# Patient Record
Sex: FEMALE | Race: WHITE | NOT HISPANIC OR LATINO | Employment: UNEMPLOYED | URBAN - METROPOLITAN AREA
[De-identification: names, ages, dates, MRNs, and addresses within clinical notes are randomized per-mention and may not be internally consistent; named-entity substitution may affect disease eponyms.]

---

## 2017-01-09 PROBLEM — E66.01 OBESITY, CLASS III, BMI 40-49.9 (MORBID OBESITY): Status: ACTIVE | Noted: 2017-01-09

## 2017-01-09 PROBLEM — E66.813 OBESITY, CLASS III, BMI 40-49.9 (MORBID OBESITY): Status: ACTIVE | Noted: 2017-01-09

## 2017-01-10 DIAGNOSIS — I48.19 PERSISTENT ATRIAL FIBRILLATION (HCC): Primary | ICD-10-CM

## 2017-01-10 RX ORDER — SODIUM CHLORIDE 0.9 % (FLUSH) 0.9 %
1-10 SYRINGE (ML) INJECTION AS NEEDED
Status: CANCELLED | OUTPATIENT
Start: 2017-01-10

## 2017-01-10 RX ORDER — ACETAMINOPHEN 325 MG/1
650 TABLET ORAL EVERY 4 HOURS PRN
Status: CANCELLED | OUTPATIENT
Start: 2017-01-10

## 2017-01-10 RX ORDER — ONDANSETRON 2 MG/ML
4 INJECTION INTRAMUSCULAR; INTRAVENOUS EVERY 6 HOURS PRN
Status: CANCELLED | OUTPATIENT
Start: 2017-01-10

## 2017-01-13 DIAGNOSIS — I10 ESSENTIAL HYPERTENSION: ICD-10-CM

## 2017-01-13 RX ORDER — DILTIAZEM HYDROCHLORIDE 360 MG/1
360 CAPSULE, EXTENDED RELEASE ORAL DAILY
Qty: 30 CAPSULE | Refills: 5 | OUTPATIENT
Start: 2017-01-13 | End: 2017-01-17 | Stop reason: SDUPTHER

## 2017-01-17 ENCOUNTER — HOSPITAL ENCOUNTER (OUTPATIENT)
Dept: CT IMAGING | Facility: HOSPITAL | Age: 57
Discharge: HOME | End: 2017-01-17
Attending: INTERNAL MEDICINE | Admitting: INTERNAL MEDICINE

## 2017-01-17 ENCOUNTER — APPOINTMENT (OUTPATIENT)
Dept: PREADMISSION TESTING | Facility: HOSPITAL | Age: 57
End: 2017-01-17

## 2017-01-17 DIAGNOSIS — I48.19 PERSISTENT ATRIAL FIBRILLATION (HCC): ICD-10-CM

## 2017-01-17 DIAGNOSIS — I10 ESSENTIAL HYPERTENSION: ICD-10-CM

## 2017-01-17 LAB
ALBUMIN SERPL-MCNC: 4.2 G/DL (ref 3.2–4.8)
ALBUMIN/GLOB SERPL: 1.2 G/DL (ref 1.5–2.5)
ALP SERPL-CCNC: 73 U/L (ref 25–100)
ALT SERPL W P-5'-P-CCNC: 22 U/L (ref 7–40)
ANION GAP SERPL CALCULATED.3IONS-SCNC: 15 MMOL/L (ref 3–11)
AST SERPL-CCNC: 21 U/L (ref 0–33)
BILIRUB SERPL-MCNC: 0.3 MG/DL (ref 0.3–1.2)
BUN BLD-MCNC: 11 MG/DL (ref 9–23)
BUN/CREAT SERPL: 15.7 (ref 7–25)
CALCIUM SPEC-SCNC: 9.9 MG/DL (ref 8.7–10.4)
CHLORIDE SERPL-SCNC: 102 MMOL/L (ref 99–109)
CO2 SERPL-SCNC: 28 MMOL/L (ref 20–31)
CREAT BLD-MCNC: 0.7 MG/DL (ref 0.6–1.3)
DEPRECATED RDW RBC AUTO: 42.3 FL (ref 37–54)
ERYTHROCYTE [DISTWIDTH] IN BLOOD BY AUTOMATED COUNT: 13 % (ref 11.3–14.5)
GFR SERPL CREATININE-BSD FRML MDRD: 86 ML/MIN/1.73
GLOBULIN UR ELPH-MCNC: 3.6 GM/DL
GLUCOSE BLD-MCNC: 93 MG/DL (ref 70–100)
HCT VFR BLD AUTO: 37.6 % (ref 34.5–44)
HGB BLD-MCNC: 12.5 G/DL (ref 11.5–15.5)
INR PPP: 1.12
MAGNESIUM SERPL-MCNC: 2.1 MG/DL (ref 1.3–2.7)
MCH RBC QN AUTO: 29.7 PG (ref 27–31)
MCHC RBC AUTO-ENTMCNC: 33.2 G/DL (ref 32–36)
MCV RBC AUTO: 89.3 FL (ref 80–99)
PLATELET # BLD AUTO: 332 10*3/MM3 (ref 150–450)
PMV BLD AUTO: 9.9 FL (ref 6–12)
POTASSIUM BLD-SCNC: 4.2 MMOL/L (ref 3.5–5.5)
PROT SERPL-MCNC: 7.8 G/DL (ref 5.7–8.2)
PROTHROMBIN TIME: 12.2 SECONDS (ref 9.6–11.5)
RBC # BLD AUTO: 4.21 10*6/MM3 (ref 3.89–5.14)
SODIUM BLD-SCNC: 145 MMOL/L (ref 132–146)
WBC NRBC COR # BLD: 11.2 10*3/MM3 (ref 3.5–10.8)

## 2017-01-17 PROCEDURE — 85610 PROTHROMBIN TIME: CPT | Performed by: PHYSICIAN ASSISTANT

## 2017-01-17 PROCEDURE — 83735 ASSAY OF MAGNESIUM: CPT | Performed by: PHYSICIAN ASSISTANT

## 2017-01-17 PROCEDURE — 85027 COMPLETE CBC AUTOMATED: CPT | Performed by: PHYSICIAN ASSISTANT

## 2017-01-17 PROCEDURE — 0 IOPAMIDOL PER 1 ML: Performed by: INTERNAL MEDICINE

## 2017-01-17 PROCEDURE — 80053 COMPREHEN METABOLIC PANEL: CPT | Performed by: PHYSICIAN ASSISTANT

## 2017-01-17 PROCEDURE — 71275 CT ANGIOGRAPHY CHEST: CPT

## 2017-01-17 PROCEDURE — 36415 COLL VENOUS BLD VENIPUNCTURE: CPT

## 2017-01-17 RX ORDER — DILTIAZEM HYDROCHLORIDE 360 MG/1
360 CAPSULE, EXTENDED RELEASE ORAL DAILY
Qty: 30 CAPSULE | Refills: 5 | OUTPATIENT
Start: 2017-01-17 | End: 2017-06-09 | Stop reason: HOSPADM

## 2017-01-17 RX ORDER — DOCUSATE SODIUM 100 MG/1
200 CAPSULE, LIQUID FILLED ORAL DAILY
COMMUNITY
End: 2021-05-21

## 2017-01-17 RX ADMIN — IOPAMIDOL 85 ML: 755 INJECTION, SOLUTION INTRAVENOUS at 16:35

## 2017-01-17 NOTE — DISCHARGE INSTRUCTIONS
The following instructions given during Pre Admission Testing visit:    Do not eat or drink anything after MN except for sips of water with your a.m. Prescription meds unless otherwise instructed by your physician.    Glasses and jewelry may be worn, but dentures must be removed prior to cath/procedure.    Leave any items you consider valuable at home.    Family members may wait in [facility] waiting area during procedure.    Bring all medications in their original containers the day of procedure.    Bring photo ID and insurance cards on the day of procedure.    Need to make arrangements for transportation prior to discharge.    The following handouts were given:     Heart Cath pathway (if applicable)   Cardiac Cath booklet published by [facility]    OR appropriate [facility] procedure booklet    If applicable, pt instructed to bring CPAP mask and tubing the day of procedure.

## 2017-01-18 ENCOUNTER — ANESTHESIA (OUTPATIENT)
Dept: CARDIOLOGY | Facility: HOSPITAL | Age: 57
End: 2017-01-18

## 2017-01-18 ENCOUNTER — ANESTHESIA EVENT (OUTPATIENT)
Dept: CARDIOLOGY | Facility: HOSPITAL | Age: 57
End: 2017-01-18

## 2017-01-18 ENCOUNTER — HOSPITAL ENCOUNTER (OUTPATIENT)
Dept: CARDIOLOGY | Facility: HOSPITAL | Age: 57
Setting detail: HOSPITAL OUTPATIENT SURGERY
Discharge: HOME | End: 2017-01-18

## 2017-01-18 DIAGNOSIS — I48.19 PERSISTENT ATRIAL FIBRILLATION (HCC): ICD-10-CM

## 2017-01-18 LAB
BH CV ECHO MEAS - BSA(HAYCOCK): 2.5 M^2
BH CV ECHO MEAS - BSA: 2.3 M^2
BH CV ECHO MEAS - BZI_BMI: 46.5 KILOGRAMS/M^2
BH CV ECHO MEAS - BZI_METRIC_HEIGHT: 167.6 CM
BH CV ECHO MEAS - BZI_METRIC_WEIGHT: 130.6 KG
LV EF 2D ECHO EST: 55 %

## 2017-01-18 PROCEDURE — 25010000002 PHENYLEPHRINE PER 1 ML: Performed by: NURSE ANESTHETIST, CERTIFIED REGISTERED

## 2017-01-18 PROCEDURE — 25010000002 ONDANSETRON PER 1 MG: Performed by: PHYSICIAN ASSISTANT

## 2017-01-18 PROCEDURE — 25010000002 DEXAMETHASONE PER 1 MG: Performed by: NURSE ANESTHETIST, CERTIFIED REGISTERED

## 2017-01-18 PROCEDURE — 25010000002 PROPOFOL 10 MG/ML EMULSION: Performed by: NURSE ANESTHETIST, CERTIFIED REGISTERED

## 2017-01-18 PROCEDURE — 93312 ECHO TRANSESOPHAGEAL: CPT | Performed by: INTERNAL MEDICINE

## 2017-01-18 PROCEDURE — 25010000002 PROPOFOL 1000 MG/ML EMULSION: Performed by: NURSE ANESTHETIST, CERTIFIED REGISTERED

## 2017-01-18 PROCEDURE — 93321 DOPPLER ECHO F-UP/LMTD STD: CPT | Performed by: INTERNAL MEDICINE

## 2017-01-18 PROCEDURE — 93312 ECHO TRANSESOPHAGEAL: CPT

## 2017-01-18 PROCEDURE — 93321 DOPPLER ECHO F-UP/LMTD STD: CPT

## 2017-01-18 PROCEDURE — 93325 DOPPLER ECHO COLOR FLOW MAPG: CPT

## 2017-01-18 RX ORDER — SODIUM CHLORIDE 9 MG/ML
INJECTION, SOLUTION INTRAVENOUS CONTINUOUS PRN
Status: DISCONTINUED | OUTPATIENT
Start: 2017-01-18 | End: 2017-01-18 | Stop reason: SURG

## 2017-01-18 RX ORDER — ROCURONIUM BROMIDE 10 MG/ML
INJECTION, SOLUTION INTRAVENOUS AS NEEDED
Status: DISCONTINUED | OUTPATIENT
Start: 2017-01-18 | End: 2017-01-18 | Stop reason: SURG

## 2017-01-18 RX ORDER — LIDOCAINE HYDROCHLORIDE 10 MG/ML
INJECTION, SOLUTION INFILTRATION; PERINEURAL AS NEEDED
Status: DISCONTINUED | OUTPATIENT
Start: 2017-01-18 | End: 2017-01-18 | Stop reason: SURG

## 2017-01-18 RX ORDER — DEXAMETHASONE SODIUM PHOSPHATE 4 MG/ML
INJECTION, SOLUTION INTRA-ARTICULAR; INTRALESIONAL; INTRAMUSCULAR; INTRAVENOUS; SOFT TISSUE AS NEEDED
Status: DISCONTINUED | OUTPATIENT
Start: 2017-01-18 | End: 2017-01-18 | Stop reason: SURG

## 2017-01-18 RX ORDER — PROPOFOL 10 MG/ML
VIAL (ML) INTRAVENOUS AS NEEDED
Status: DISCONTINUED | OUTPATIENT
Start: 2017-01-18 | End: 2017-01-18 | Stop reason: SURG

## 2017-01-18 RX ADMIN — LIDOCAINE HYDROCHLORIDE 50 MG: 10 INJECTION, SOLUTION INFILTRATION; PERINEURAL at 12:00

## 2017-01-18 RX ADMIN — ROCURONIUM BROMIDE 50 MG: 10 INJECTION, SOLUTION INTRAVENOUS at 12:00

## 2017-01-18 RX ADMIN — PROPOFOL 200 MG: 10 INJECTION, EMULSION INTRAVENOUS at 12:00

## 2017-01-18 RX ADMIN — PROPOFOL 25 MCG/KG/MIN: 10 INJECTION, EMULSION INTRAVENOUS at 12:06

## 2017-01-18 RX ADMIN — DEXAMETHASONE SODIUM PHOSPHATE 8 MG: 4 INJECTION, SOLUTION INTRAMUSCULAR; INTRAVENOUS at 12:05

## 2017-01-18 RX ADMIN — ONDANSETRON 4 MG: 2 INJECTION INTRAMUSCULAR; INTRAVENOUS at 16:46

## 2017-01-18 RX ADMIN — SODIUM CHLORIDE: 9 INJECTION, SOLUTION INTRAVENOUS at 11:48

## 2017-01-18 RX ADMIN — PHENYLEPHRINE HYDROCHLORIDE 50 MCG: 10 INJECTION INTRAVENOUS at 14:25

## 2017-01-18 NOTE — ANESTHESIA PROCEDURE NOTES
Airway  Urgency: elective    Airway not difficult    General Information and Staff    Patient location during procedure: OR  Anesthesiologist: FREDY GROSS  CRNA: KAY ALSTON    Indications and Patient Condition  Indications for airway management: airway protection    Preoxygenated: yes  MILS not maintained throughout  Mask difficulty assessment: 2 - vent by mask + OA or adjuvant +/- NMBA    Final Airway Details  Final airway type: endotracheal airway      Successful airway: ETT  Cuffed: yes   Successful intubation technique: direct laryngoscopy  Facilitating devices/methods: intubating stylet  Endotracheal tube insertion site: oral  Blade: Brenton  Blade size: #3  ETT size: 7.5 mm  Cormack-Lehane Classification: grade I - full view of glottis  Placement verified by: chest auscultation and capnometry   Cuff volume (mL): 6  Measured from: lips  ETT to lips (cm): 21  Number of attempts at approach: 1    Additional Comments  Negative epigastric sounds, Breath sound equal bilaterally with symmetric chest rise and fall

## 2017-01-18 NOTE — ANESTHESIA PREPROCEDURE EVALUATION
Anesthesia Evaluation     Patient summary reviewed and Nursing notes reviewed    History of anesthetic complications (PONV)   Airway   Mallampati: II  TM distance: >3 FB  Neck ROM: full  no difficulty expected  Dental    (+) upper dentures    Pulmonary - negative pulmonary ROS and normal exam    breath sounds clear to auscultation  Cardiovascular - normal exam  (+) hypertension well controlled, dysrhythmias Atrial Fib,     ECG reviewed  Rhythm: regular  Rate: normal  ROS comment: Stress echo 2015, WNL EF: 60%    Neuro/Psych- negative ROS  GI/Hepatic/Renal/Endo    (+) morbid obesity, GERD well controlled, diabetes mellitus type 2,     Musculoskeletal (-) negative ROS    Abdominal    Substance History - negative use     OB/GYN          Other - negative ROS                            Anesthesia Plan    ASA 3     general     intravenous induction   Anesthetic plan and risks discussed with patient.    Plan discussed with CRNA.

## 2017-01-18 NOTE — ANESTHESIA POSTPROCEDURE EVALUATION
Patient: Renetta Horner    Procedure Summary     Date Anesthesia Start Anesthesia Stop Room / Location    01/18/17 1148  [facility] / [facility]       Procedure Diagnosis Provider Provider    Schedule for a PVA. Cancel the [facility] admission. (N/A ) Persistent atrial fibrillation  (afib) DO Hammad Mosley MD          Anesthesia Type: general  Last vitals  /61 (01/18/17 1719)    Temp 98.4 °F (36.9 °C) (01/18/17 1719)    Pulse 68 (01/18/17 1719)   Resp 16 (01/18/17 1719)    SpO2 91 % (01/18/17 1719)      Post Anesthesia Care and Evaluation    Patient location during evaluation: PACU  Patient participation: complete - patient participated  Level of consciousness: awake and alert  Pain score: 0  Pain management: adequate  Airway patency: patent  Anesthetic complications: No anesthetic complications  PONV Status: none  Cardiovascular status: hemodynamically stable and acceptable  Respiratory status: nonlabored ventilation, acceptable and nasal cannula  Hydration status: acceptable

## 2017-01-19 PROBLEM — I48.19 PERSISTENT ATRIAL FIBRILLATION: Status: RESOLVED | Noted: 2017-01-18 | Resolved: 2017-01-19

## 2017-01-20 ENCOUNTER — TELEPHONE (OUTPATIENT)
Dept: CARDIOLOGY | Facility: CLINIC | Age: 57
End: 2017-01-20

## 2017-01-20 RX ORDER — LOSARTAN POTASSIUM 25 MG/1
25 TABLET ORAL DAILY
Qty: 30 TABLET | Refills: 6 | OUTPATIENT
Start: 2017-01-20 | End: 2017-08-14 | Stop reason: SDUPTHER

## 2017-01-20 NOTE — TELEPHONE ENCOUNTER
"When Mrs. Horner was discharged yesterday, Dr. Rey had ordered for her to start losartan 25mg Q Day.  She called & said the order did not transfer to her pharmacy.  I called it in for her.    She also stated she started having a \"loose cough\" last night.  She has PRN lasix that she takes.  I suggested she take a lasix this AM & call me back this afternoon to see if the cough has improved.  "

## 2017-01-30 ENCOUNTER — TELEPHONE (OUTPATIENT)
Dept: CARDIOLOGY | Facility: CLINIC | Age: 57
End: 2017-01-30

## 2017-01-30 NOTE — TELEPHONE ENCOUNTER
"Mrs. Pendleton called at 9:30 this AM stating she'd had \"racing\" Friday night that lasted until Sat AM, then started again yesterday afternoon & was still going when she called me.  HR was \"bouncing\" from 119-145.  She had taken 75mg of flecainide an hour before - I had her take an extra 75mg.  She called me now & says she's doing better, HR in the 90s.  Reassured her this is normal 2wks post PVA & that if she needs to, she can take an extra 75mg once a day, but to let me know if she's needing to do that often, as she will need a refill / dose adjustment.  "

## 2017-02-27 ENCOUNTER — TELEPHONE (OUTPATIENT)
Dept: CARDIOLOGY | Facility: CLINIC | Age: 57
End: 2017-02-27

## 2017-02-27 DIAGNOSIS — I48.0 PAROXYSMAL ATRIAL FIBRILLATION (HCC): Primary | ICD-10-CM

## 2017-02-27 RX ORDER — DABIGATRAN ETEXILATE 150 MG/1
150 CAPSULE ORAL 2 TIMES DAILY
Qty: 60 CAPSULE | Refills: 7 | Status: SHIPPED | OUTPATIENT
Start: 2017-02-27 | End: 2017-10-25 | Stop reason: SDUPTHER

## 2017-02-27 NOTE — TELEPHONE ENCOUNTER
----- Message from Stephon Holland sent at 2/27/2017  8:21 AM EST -----  Contact: SEPIDEH  ROSALINA IS REQUESTING A REFILL ON HER PRADAXA BE CALLED INTO [facility] [place]

## 2017-03-02 ENCOUNTER — OFFICE VISIT (OUTPATIENT)
Dept: CARDIOLOGY | Facility: CLINIC | Age: 57
End: 2017-03-02

## 2017-03-02 VITALS
RESPIRATION RATE: 16 BRPM | OXYGEN SATURATION: 97 % | DIASTOLIC BLOOD PRESSURE: 87 MMHG | HEIGHT: 66 IN | BODY MASS INDEX: 45.35 KG/M2 | SYSTOLIC BLOOD PRESSURE: 159 MMHG | WEIGHT: 282.2 LBS | HEART RATE: 84 BPM

## 2017-03-02 DIAGNOSIS — R06.83 SNORING: ICD-10-CM

## 2017-03-02 DIAGNOSIS — Z00.00 HEALTHCARE MAINTENANCE: ICD-10-CM

## 2017-03-02 DIAGNOSIS — I10 ESSENTIAL HYPERTENSION: Primary | ICD-10-CM

## 2017-03-02 DIAGNOSIS — E11.9 TYPE 2 DIABETES MELLITUS WITHOUT COMPLICATION, WITHOUT LONG-TERM CURRENT USE OF INSULIN (HCC): ICD-10-CM

## 2017-03-02 DIAGNOSIS — I48.0 PAROXYSMAL ATRIAL FIBRILLATION (HCC): ICD-10-CM

## 2017-03-02 PROCEDURE — 99214 OFFICE O/P EST MOD 30 MIN: CPT | Performed by: NURSE PRACTITIONER

## 2017-03-02 PROCEDURE — 93000 ELECTROCARDIOGRAM COMPLETE: CPT | Performed by: NURSE PRACTITIONER

## 2017-03-02 RX ORDER — CIMETIDINE 400 MG/1
400 TABLET, FILM COATED ORAL 2 TIMES DAILY
COMMUNITY
End: 2017-06-09 | Stop reason: HOSPADM

## 2017-03-02 NOTE — PATIENT INSTRUCTIONS
Atrial Fibrillation  Atrial fibrillation is a type of irregular or rapid heartbeat (arrhythmia). In atrial fibrillation, the heart quivers continuously in a chaotic pattern. This occurs when parts of the heart receive disorganized signals that make the heart unable to pump blood normally. This can increase the risk for stroke, heart failure, and other heart-related conditions. There are different types of atrial fibrillation, including:  · Paroxysmal atrial fibrillation. This type starts suddenly, and it usually stops on its own shortly after it starts.  · Persistent atrial fibrillation. This type often lasts longer than a week. It may stop on its own or with treatment.  · Long-lasting persistent atrial fibrillation. This type lasts longer than 12 months.  · Permanent atrial fibrillation. This type does not go away.  Talk with your health care provider to learn about the type of atrial fibrillation that you have.  CAUSES  This condition is caused by some heart-related conditions or procedures, including:  · A heart attack.  · Coronary artery disease.  · Heart failure.  · Heart valve conditions.  · High blood pressure.  · Inflammation of the sac that surrounds the heart (pericarditis).  · Heart surgery.  · Certain heart rhythm disorders, such as Flynn-Parkinson-White syndrome.  Other causes include:  · Pneumonia.  · Obstructive sleep apnea.  · Blockage of an artery in the lungs (pulmonary embolism, or PE).  · Lung cancer.  · Chronic lung disease.  · Thyroid problems, especially if the thyroid is overactive (hyperthyroidism).  · Caffeine.  · Excessive alcohol use or illegal drug use.  · Use of some medicines, including certain decongestants and diet pills.  Sometimes, the cause cannot be found.  RISK FACTORS  This condition is more likely to develop in:  · People who are older in age.  · People who smoke.  · People who have diabetes mellitus.  · People who are overweight (obese).  · Athletes who exercise  vigorously.  SYMPTOMS  Symptoms of this condition include:  · A feeling that your heart is beating rapidly or irregularly.  · A feeling of discomfort or pain in your chest.  · Shortness of breath.  · Sudden light-headedness or weakness.  · Getting tired easily during exercise.  In some cases, there are no symptoms.  DIAGNOSIS  Your health care provider may be able to detect atrial fibrillation when taking your pulse. If detected, this condition may be diagnosed with:  · An electrocardiogram (ECG).  · A Holter monitor test that records your heartbeat patterns over a 24-hour period.  · Transthoracic echocardiogram (TTE) to evaluate how blood flows through your heart.  · Transesophageal echocardiogram (YARON) to view more detailed images of your heart.  · A stress test.  · Imaging tests, such as a CT scan or chest X-ray.  · Blood tests.  TREATMENT  The main goals of treatment are to prevent blood clots from forming and to keep your heart beating at a normal rate and rhythm. The type of treatment that you receive depends on many factors, such as your underlying medical conditions and how you feel when you are experiencing atrial fibrillation.  This condition may be treated with:  · Medicine to slow down the heart rate, bring the heart's rhythm back to normal, or prevent clots from forming.  · Electrical cardioversion. This is a procedure that resets your heart's rhythm by delivering a controlled, low-energy shock to the heart through your skin.  · Different types of ablation, such as catheter ablation, catheter ablation with pacemaker, or surgical ablation. These procedures destroy the heart tissues that send abnormal signals. When the pacemaker is used, it is placed under your skin to help your heart beat in a regular rhythm.  HOME CARE INSTRUCTIONS  · Take over-the counter and prescription medicines only as told by your health care provider.  · If your health care provider prescribed a blood-thinning medicine  (anticoagulant), take it exactly as told. Taking too much blood-thinning medicine can cause bleeding. If you do not take enough blood-thinning medicine, you will not have the protection that you need against stroke and other problems.  · Do not use tobacco products, including cigarettes, chewing tobacco, and e-cigarettes. If you need help quitting, ask your health care provider.  · If you have obstructive sleep apnea, manage your condition as told by your health care provider.  · Do not drink alcohol.  · Do not drink beverages that contain caffeine, such as coffee, soda, and tea.  · Maintain a healthy weight. Do not use diet pills unless your health care provider approves. Diet pills may make heart problems worse.  · Follow diet instructions as told by your health care provider.  · Exercise regularly as told by your health care provider.  · Keep all follow-up visits as told by your health care provider. This is important.  PREVENTION  · Avoid drinking beverages that contain caffeine or alcohol.  · Avoid certain medicines, especially medicines that are used for breathing problems.  · Avoid certain herbs and herbal medicines, such as those that contain ephedra or ginseng.  · Do not use illegal drugs, such as cocaine and amphetamines.  · Do not smoke.  · Manage your high blood pressure.  SEEK MEDICAL CARE IF:  · You notice a change in the rate, rhythm, or strength of your heartbeat.  · You are taking an anticoagulant and you notice increased bruising.  · You tire more easily when you exercise or exert yourself.  SEEK IMMEDIATE MEDICAL CARE IF:  · You have chest pain, abdominal pain, sweating, or weakness.  · You feel nauseous.  · You notice blood in your vomit, bowel movement, or urine.  · You have shortness of breath.  · You suddenly have swollen feet and ankles.  · You feel dizzy.  · You have sudden weakness or numbness of the face, arm, or leg, especially on one side of the body.  · You have trouble speaking,  trouble understanding, or both (aphasia).  · Your face or your eyelid droops on one side.  These symptoms may represent a serious problem that is an emergency. Do not wait to see if the symptoms will go away. Get medical help right away. Call your local emergency services (911 in the [place] Do not drive yourself to the hospital.     This information is not intended to replace advice given to you by your health care provider. Make sure you discuss any questions you have with your health care provider.     Document Released: 12/18/2006 Document Revised: 09/07/2016 Document Reviewed: 04/13/2016  [employer] Patient Education ©2016 [employer]

## 2017-03-02 NOTE — PROGRESS NOTES
Subjective   Renetta Horner is a 56 y.o. female     Chief Complaint   Patient presents with   • Follow-up   • Atrial Fibrillation       HPI    Problem List:    1. Atrial Fibrillation   1.1 CHADS score 2; patient intolerant to ASA/Plavis and anticoagulation d/c'd due to hemarthrosis of the right knee (She has tried Xarelto and Eliquis)  1.2 Event Monitor 4/23-5/3/16 - Afib and Afib with RVR  1.3 Ablation with Dr. Rey 1/18/17  2. Hypertension  2.1 Echo 4/8/15 - EF > 65%; DD II; trace MR  3. Chest Pain   3.1 Stress Test 4/8/15 - no ischemia   4. Diabetes Mellitus II    Patient is a 56-year-old female who presents today for a follow-up s/p ablation with her  at her side.  She denies any chest pain, pressure, presyncope, syncope, orthopnea or PND.  She will feel palpitations some, but much better.  She will get dizzy if she bends over or tilts her head back.  She says she does not get short of breath with any of her activity.  She has not had any recent labs.  She denies any signs of bleeding or cerebral ischemic events.     Current Outpatient Prescriptions   Medication Sig Dispense Refill   • cimetidine (TAGAMET) 400 MG tablet Take 400 mg by mouth 2 (Two) Times a Day.     • dabigatran etexilate (PRADAXA) 150 MG capsu Take 1 capsule by mouth 2 (Two) Times a Day. 60 capsule 7   • diltiaZEM CD (CARDIZEM CD) 360 MG 24 hr capsule Take 1 capsule by mouth Daily. 30 capsule 5   • docusate sodium (COLACE) 100 MG capsule Take 200 mg by mouth Daily.     • flecainide (TAMBOCOR) 150 MG tablet Take 0.5 tablets by mouth Every 12 (Twelve) Hours for 180 days. 30 tablet 5   • furosemide (LASIX) 20 MG tablet Take 1 tablet by mouth daily as needed (edema). 20 tablet 3   • losartan (COZAAR) 25 MG tablet Take 1 tablet by mouth Daily. 30 tablet 6   • metFORMIN (GLUCOPHAGE) 500 MG tablet Take 500 mg by mouth 2 (Two) Times a Day With Meals.       No current facility-administered medications for this visit.         ALLERGIES    Indomethacin; Lisinopril; Penicillins; Other; Sulfa antibiotics; Ultram [tramadol]; and Codeine    Past Medical History   Diagnosis Date   • Arthritis    • Atrial fibrillation      CHADS-VASC = 3   • Chest pain    • Diabetes mellitus      on oral agents, Type 2 , Patient states last a1c was 5.9 --DX'D 5 YEARS AGO, CHECKS BLOOD SUGAR DAILY AVERAGE 115-122   • Fatigue    • GERD (gastroesophageal reflux disease)    • Headache    • Heart murmur    • Hypertension      CONTROLLED WITH MEDS PER PT    • Palpitations    • PONV (postoperative nausea and vomiting)    • Wears dentures      UPPER PLATE        Social History     Social History   • Marital status:      Spouse name: N/A   • Number of children: N/A   • Years of education: N/A     Occupational History   • Not on file.     Social History Main Topics   • Smoking status: Former Smoker     Types: Cigarettes   • Smokeless tobacco: Never Used      Comment: QUIT 1997   • Alcohol use No   • Drug use: No   • Sexual activity: Defer     Other Topics Concern   • Not on file     Social History Narrative       Family History   Problem Relation Age of Onset   • Coronary artery disease Mother    • Breast cancer Mother    • Diabetes Mother    • Other Mother      Benign prostatic hypertrophy       Review of Systems   Constitutional: Negative for diaphoresis and fatigue.   HENT: Negative for rhinorrhea and sneezing.    Eyes: Positive for visual disturbance (reading glasses ).   Respiratory: Negative for chest tightness and shortness of breath.    Cardiovascular: Positive for palpitations (some but not too bad) and leg swelling (no more than usual ). Negative for chest pain.   Gastrointestinal: Negative for nausea and vomiting.   Endocrine: Negative.    Genitourinary: Negative for difficulty urinating.   Musculoskeletal: Positive for arthralgias, back pain and neck pain.   Allergic/Immunologic: Negative.    Neurological: Positive for dizziness (bend over or  "tilt head back ). Negative for syncope and light-headedness.   Hematological: Bruises/bleeds easily (not too bad ).   Psychiatric/Behavioral: Negative.        Objective   Visit Vitals   • /87 (BP Location: Left arm, Patient Position: Sitting)   • Pulse 84   • Resp 16   • Ht 66\" (167.6 cm)   • Wt 282 lb 3.2 oz (128 kg)   • LMP  (LMP Unknown)   • SpO2 97%   • BMI 45.55 kg/m2     Lab Results (most recent)     None        Physical Exam   Constitutional: She is oriented to person, place, and time. Vital signs are normal. She appears well-developed. She is active and cooperative.   HENT:   Head: Normocephalic.   Eyes: Lids are normal.   Neck: Normal carotid pulses, no hepatojugular reflux and no JVD present. Carotid bruit is not present.   Cardiovascular: Normal rate and normal heart sounds.  An irregularly irregular rhythm present.   Pulses:       Radial pulses are 2+ on the right side, and 2+ on the left side.        Dorsalis pedis pulses are 2+ on the right side, and 2+ on the left side.        Posterior tibial pulses are 2+ on the right side, and 2+ on the left side.   Trace edema BLE.    Pulmonary/Chest: Effort normal and breath sounds normal.   Abdominal: Normal appearance.   Neurological: She is alert and oriented to person, place, and time.   Skin: Skin is warm, dry and intact.   Psychiatric: She has a normal mood and affect. Her speech is normal and behavior is normal. Judgment and thought content normal. Cognition and memory are normal.       Procedure     ECG 12 Lead  Date/Time: 3/2/2017 1:47 PM  Performed by: BANDAR MONTERO  Authorized by: BANDAR MONTERO   Comparison: compared with previous ECG from 1/20/2017  Rhythm: atrial fibrillation  Rate: normal  QRS axis: normal  Clinical impression: abnormal ECG                 Assessment/Plan      Diagnosis Plan   1. Essential hypertension  Lipid Panel    Comprehensive Metabolic Panel   2. Type 2 diabetes mellitus without complication, without long-term " current use of insulin  Lipid Panel   3. Paroxysmal atrial fibrillation  Thyroid Panel With TSH   4. Snoring  Pulse Oximetry Device   5. Healthcare maintenance  Lipid Panel    Comprehensive Metabolic Panel    Thyroid Panel With TSH       Return in about 6 months (around 9/2/2017).       Patient is doing well from a cardiac standpoint.  She will continue her medication regimen.  She will get TSH, lipids and CMP.  She will follow-up in 6 mos or sooner if any changes.  She will wear an overnight pulse ox.

## 2017-03-14 ENCOUNTER — TELEPHONE (OUTPATIENT)
Dept: CARDIOLOGY | Facility: CLINIC | Age: 57
End: 2017-03-14

## 2017-03-14 DIAGNOSIS — E11.9 TYPE 2 DIABETES MELLITUS WITHOUT COMPLICATION, WITHOUT LONG-TERM CURRENT USE OF INSULIN (HCC): Primary | ICD-10-CM

## 2017-03-14 DIAGNOSIS — I10 ESSENTIAL HYPERTENSION: ICD-10-CM

## 2017-03-14 NOTE — TELEPHONE ENCOUNTER
Patient aware of results of recent labs. Patient reports she is a diabetic. She declines use of any Statin at this time. Will fax lab order to patient to recheck Potassium level.

## 2017-04-21 ENCOUNTER — TELEPHONE (OUTPATIENT)
Dept: CARDIOLOGY | Facility: CLINIC | Age: 57
End: 2017-04-21

## 2017-04-28 ENCOUNTER — OFFICE VISIT (OUTPATIENT)
Dept: CARDIOLOGY | Facility: CLINIC | Age: 57
End: 2017-04-28

## 2017-04-28 VITALS
HEART RATE: 108 BPM | DIASTOLIC BLOOD PRESSURE: 78 MMHG | BODY MASS INDEX: 46.61 KG/M2 | SYSTOLIC BLOOD PRESSURE: 138 MMHG | HEIGHT: 66 IN | WEIGHT: 290 LBS

## 2017-04-28 DIAGNOSIS — I48.19 PERSISTENT ATRIAL FIBRILLATION (HCC): Primary | ICD-10-CM

## 2017-04-28 PROCEDURE — 99213 OFFICE O/P EST LOW 20 MIN: CPT | Performed by: INTERNAL MEDICINE

## 2017-04-28 PROCEDURE — 93000 ELECTROCARDIOGRAM COMPLETE: CPT | Performed by: INTERNAL MEDICINE

## 2017-04-28 RX ORDER — FLECAINIDE ACETATE 100 MG/1
100 TABLET ORAL 2 TIMES DAILY
Qty: 60 TABLET | Refills: 11 | Status: SHIPPED | OUTPATIENT
Start: 2017-04-28 | End: 2017-06-06

## 2017-04-28 NOTE — PROGRESS NOTES
Subjective:   Renetta Horner  1960  [phone]      04/28/2017    [facility] CARDIOLOGY    Gustabo Quach MD  [address] [facility]  [place] [id number]    REFERRING DOCTOR: Mt Seals      Patient ID: Renetta Horner is a 56 y.o. female.    Chief Complaint: Afib    Problem List:  1. Persistent Atrial Fibrillation s/p ECV X2 with IRAF and failed Flecainide  A. Diagnosed in 2015. CHADS-VASc = 2   B. No a/c secondary to hemarthrosis on Xarelto.   C. Echo show EF of 65%. Stress test negative for ischemia.   D. Restarted on Pradaxa and no major issues 150 mg BID  E. Pulmonary vein ablation/roof line/box in lesion along the posterior wall on 01/18/2017.     Allergies   Allergen Reactions   • Indomethacin Shortness Of Breath     , HIVES    • Lisinopril Shortness Of Breath   • Penicillins Anaphylaxis   • Other Itching     [place]   • Sulfa Antibiotics Nausea And Vomiting   • Ultram [Tramadol] Nausea And Vomiting   • Codeine Nausea Only     ITCHING        Current Outpatient Prescriptions:   •  cimetidine (TAGAMET) 400 MG tablet, Take 400 mg by mouth 2 (Two) Times a Day., Disp: , Rfl:   •  dabigatran etexilate (PRADAXA) 150 MG capsu, Take 1 capsule by mouth 2 (Two) Times a Day., Disp: 60 capsule, Rfl: 7  •  diltiaZEM CD (CARDIZEM CD) 360 MG 24 hr capsule, Take 1 capsule by mouth Daily., Disp: 30 capsule, Rfl: 5  •  docusate sodium (COLACE) 100 MG capsule, Take 200 mg by mouth Daily., Disp: , Rfl:   •  flecainide (TAMBOCOR) 150 MG tablet, Take 0.5 tablets by mouth Every 12 (Twelve) Hours for 180 days., Disp: 30 tablet, Rfl: 5  •  furosemide (LASIX) 20 MG tablet, Take 1 tablet by mouth daily as needed (edema)., Disp: 20 tablet, Rfl: 3  •  losartan (COZAAR) 25 MG tablet, Take 1 tablet by mouth Daily., Disp: 30 tablet, Rfl: 6  •  metFORMIN (GLUCOPHAGE) 500 MG tablet, Take 500 mg by mouth 2 (Two) Times a Day With Meals., Disp: , Rfl:   •  flecainide (TAMBOCOR) 100 MG tablet, Take 1  "tablet by mouth 2 (Two) Times a Day., Disp: 60 tablet, Rfl: 11    History of Present Illness  The patient is a 56-year-old female here today for follow-up visit for persistent atrial fibrillation status post pulmonary vein ablation in January 2017.  Patient states that she thinks she is going in and out of atrial fibrillation since her rates that time or in the 80s and other times in the 120s.  Looking back at EKGs even in March at time of follow up with Dr. Seals's office seems the patient was in atrial fibrillation however rates controlled.  She states she can tell she is in atrial fibrillation when she gets this hot feeling and also some fatigue with some mild shortness of breath.  Overall she states she's doing better since her pulmonary vein ablation however still feels like she is having atrial fibrillation.    No issues with chest pain, shortness of breath, fevers, chills, night sweats, PND, orthopnea or palpitations. No recent ER visits or hospital stays.      The following portions of the patient's history were reviewed and updated as appropriate: allergies, current medications, past family history, past medical history, past social history, past surgical history and problem list.    ROS   14 point ROS negative except as outlined in problem list, HPI and other parts of the note.      ECG 12 Lead  Date/Time: 4/28/2017 11:24 AM  Performed by: COLBY SO  Authorized by: COLBY SO   Rhythm: atrial fibrillation  Conduction: non-specific intraventricular conduction delay  Comments: Atrial fibrillation with heart rate of 108 bpm QRS duration 116 ms.  No significant change.               Objective:       Vitals:    04/28/17 1025   BP: 138/78   BP Location: Right arm   Patient Position: Sitting   Pulse: 108   Weight: 290 lb (132 kg)   Height: 66\" (167.6 cm)       GENERAL: Well-developed, well-nourished patient in no acute distress.  HEENT: Normocephalic, atraumatic, PERRLA. Moist mucous " membranes.  NECK: No JVD present at 30°. No carotid bruits auscultated.  LUNGS: Clear to auscultation.  CARDIOVASCULAR: irreg irreg tachy No murmurs, gallops or rubs noted.   ABDOMEN: Soft, nontender. Positive bowel sounds.  MUSCULOSKELETAL: No gross deformities. No clubbing, cyanosis, or lower extremity edema.  SKIN: Pink, warm  Neuro: Nonfocal exam. Gait intact  Ext: No edema or bruising    The patient's old records including ambulatory rhythm recordings (ECGs, Holter/event monitor) were reviewed and discussed.      Lab Review:   Results for orders placed or performed during the hospital encounter of 01/18/17   CBC Auto Differential   Result Value Ref Range    WBC 15.19 (H) 3.50 - 10.80 10*3/mm3    RBC 4.15 3.89 - 5.14 10*6/mm3    Hemoglobin 12.5 11.5 - 15.5 g/dL    Hematocrit 36.9 34.5 - 44.0 %    MCV 88.9 80.0 - 99.0 fL    MCH 30.1 27.0 - 31.0 pg    MCHC 33.9 32.0 - 36.0 g/dL    RDW 13.0 11.3 - 14.5 %    RDW-SD 42.5 37.0 - 54.0 fl    MPV 10.1 6.0 - 12.0 fL    Platelets 348 150 - 450 10*3/mm3    Neutrophil % 79.5 (H) 41.0 - 71.0 %    Lymphocyte % 15.7 (L) 24.0 - 44.0 %    Monocyte % 4.3 0.0 - 12.0 %    Eosinophil % 0.0 0.0 - 3.0 %    Basophil % 0.1 0.0 - 1.0 %    Immature Grans % 0.4 0.0 - 0.6 %    Neutrophils, Absolute 12.08 (H) 1.50 - 8.30 10*3/mm3    Lymphocytes, Absolute 2.39 0.60 - 4.80 10*3/mm3    Monocytes, Absolute 0.65 0.00 - 1.00 10*3/mm3    Eosinophils, Absolute 0.00 (L) 0.10 - 0.30 10*3/mm3    Basophils, Absolute 0.01 0.00 - 0.20 10*3/mm3    Immature Grans, Absolute 0.06 (H) 0.00 - 0.03 10*3/mm3   POC Glucose Fingerstick   Result Value Ref Range    Glucose 129 70 - 130 mg/dL   POC Glucose Fingerstick   Result Value Ref Range    Glucose 131 (H) 70 - 130 mg/dL   POC Glucose Fingerstick   Result Value Ref Range    Glucose 176 (H) 70 - 130 mg/dL   POC Glucose Fingerstick   Result Value Ref Range    Glucose 178 (H) 70 - 130 mg/dL   POC Glucose Fingerstick   Result Value Ref Range    Glucose 173 (H) 70 -  130 mg/dL   POC Activated Clotting Time   Result Value Ref Range    Activated Clotting Time  204 (H) 82 - 152 Seconds   POC Activated Clotting Time   Result Value Ref Range    Activated Clotting Time  275 (H) 82 - 152 Seconds   POC Activated Clotting Time   Result Value Ref Range    Activated Clotting Time  296 (H) 82 - 152 Seconds   POC Activated Clotting Time   Result Value Ref Range    Activated Clotting Time  343 (H) 82 - 152 Seconds   POC Activated Clotting Time   Result Value Ref Range    Activated Clotting Time  353 (H) 82 - 152 Seconds   POC Activated Clotting Time   Result Value Ref Range    Activated Clotting Time  353 (H) 82 - 152 Seconds   POC Activated Clotting Time   Result Value Ref Range    Activated Clotting Time  353 (H) 82 - 152 Seconds   POC Glucose Fingerstick   Result Value Ref Range    Glucose 158 (H) 70 - 130 mg/dL   POC Activated Clotting Time   Result Value Ref Range    Activated Clotting Time  204 (H) 82 - 152 Seconds   POC Activated Clotting Time   Result Value Ref Range    Activated Clotting Time  183 (H) 82 - 152 Seconds   POC Activated Clotting Time   Result Value Ref Range    Activated Clotting Time  167 (H) 82 - 152 Seconds   POC Activated Clotting Time   Result Value Ref Range    Activated Clotting Time  178 (H) 82 - 152 Seconds   POC Activated Clotting Time   Result Value Ref Range    Activated Clotting Time  168 (H) 82 - 152 Seconds   POC Activated Clotting Time   Result Value Ref Range    Activated Clotting Time  162 (H) 82 - 152 Seconds           Diagnosis:   1. Persistent atrial fibrillation  - Cardioversion External in Cardiology Department; Future    Assessment & Plan:   1.  Persistent symptomatic atrial fibrillation despite use of multiple antiarrhythmic drugs status post pulmonary vein ablation in January 2017.  Patient has been maintained on flecainide 75 mg twice a day, Cardizem and NOAC.  Looking back at the EKGs it seems the patient has been in atrial fibrillation at  least since early March 2017 at time of follow-up visit with Dr. Seals's office.  Patient states that her rates do seem to be fluctuating between 75 and at times up to 120-130 bpm.  She notices it most when her heart rate is tachycardic.  Therefore think it is best to proceed with trying to get the patient back to normal sinus rhythm.  We'll increase the flecainide 100 mg twice a day on Sunday and bring the patient back in for a cardioversion on Tuesday.  If this was to fail then at that time consideration of changing the flecainide over to dofetilide.  She understands all risk and benefits and wished to proceed with a cardioversion and plan of care is indicated.         CC: Dr Mt Rey,   04/28/17  11:25 AM      EMR Dragon/Transcription disclaimer:  Much of this encounter note is an electronic transcription/translation of spoken language to printed text. Electronic translation of spoken language may permit erroneous, or at times, nonsensical words or phrases to be inadvertently transcribed. Although I have reviewed the note for such errors, some may still exist.

## 2017-05-01 DIAGNOSIS — I48.19 PERSISTENT ATRIAL FIBRILLATION (HCC): Primary | ICD-10-CM

## 2017-05-01 RX ORDER — ONDANSETRON 2 MG/ML
4 INJECTION INTRAMUSCULAR; INTRAVENOUS EVERY 6 HOURS PRN
Status: CANCELLED | OUTPATIENT
Start: 2017-05-01

## 2017-05-01 RX ORDER — ACETAMINOPHEN 325 MG/1
650 TABLET ORAL EVERY 4 HOURS PRN
Status: CANCELLED | OUTPATIENT
Start: 2017-05-01

## 2017-05-01 RX ORDER — SODIUM CHLORIDE 0.9 % (FLUSH) 0.9 %
1-10 SYRINGE (ML) INJECTION AS NEEDED
Status: CANCELLED | OUTPATIENT
Start: 2017-05-01

## 2017-05-02 ENCOUNTER — HOSPITAL ENCOUNTER (OUTPATIENT)
Dept: CARDIOLOGY | Facility: HOSPITAL | Age: 57
Discharge: HOME | End: 2017-05-02
Attending: INTERNAL MEDICINE | Admitting: INTERNAL MEDICINE

## 2017-05-02 VITALS
WEIGHT: 283.73 LBS | HEART RATE: 83 BPM | TEMPERATURE: 97.9 F | HEIGHT: 66 IN | OXYGEN SATURATION: 99 % | SYSTOLIC BLOOD PRESSURE: 138 MMHG | BODY MASS INDEX: 45.6 KG/M2 | DIASTOLIC BLOOD PRESSURE: 73 MMHG

## 2017-05-02 DIAGNOSIS — I48.19 PERSISTENT ATRIAL FIBRILLATION (HCC): ICD-10-CM

## 2017-05-02 LAB
ANION GAP SERPL CALCULATED.3IONS-SCNC: 4 MMOL/L (ref 3–11)
BUN BLD-MCNC: 10 MG/DL (ref 9–23)
BUN/CREAT SERPL: 16.7 (ref 7–25)
CALCIUM SPEC-SCNC: 10 MG/DL (ref 8.7–10.4)
CHLORIDE SERPL-SCNC: 103 MMOL/L (ref 99–109)
CO2 SERPL-SCNC: 33 MMOL/L (ref 20–31)
CREAT BLD-MCNC: 0.6 MG/DL (ref 0.6–1.3)
DEPRECATED RDW RBC AUTO: 42.8 FL (ref 37–54)
ERYTHROCYTE [DISTWIDTH] IN BLOOD BY AUTOMATED COUNT: 13 % (ref 11.3–14.5)
GFR SERPL CREATININE-BSD FRML MDRD: 103 ML/MIN/1.73
GLUCOSE BLD-MCNC: 92 MG/DL (ref 70–100)
HCT VFR BLD AUTO: 37.6 % (ref 34.5–44)
HGB BLD-MCNC: 12 G/DL (ref 11.5–15.5)
MAGNESIUM SERPL-MCNC: 1.9 MG/DL (ref 1.3–2.7)
MCH RBC QN AUTO: 28.8 PG (ref 27–31)
MCHC RBC AUTO-ENTMCNC: 31.9 G/DL (ref 32–36)
MCV RBC AUTO: 90.4 FL (ref 80–99)
PLATELET # BLD AUTO: 308 10*3/MM3 (ref 150–450)
PMV BLD AUTO: 10.4 FL (ref 6–12)
POTASSIUM BLD-SCNC: 4.6 MMOL/L (ref 3.5–5.5)
RBC # BLD AUTO: 4.16 10*6/MM3 (ref 3.89–5.14)
SODIUM BLD-SCNC: 140 MMOL/L (ref 132–146)
WBC NRBC COR # BLD: 8.95 10*3/MM3 (ref 3.5–10.8)

## 2017-05-02 PROCEDURE — 93005 ELECTROCARDIOGRAM TRACING: CPT | Performed by: PHYSICIAN ASSISTANT

## 2017-05-02 PROCEDURE — 80048 BASIC METABOLIC PNL TOTAL CA: CPT | Performed by: PHYSICIAN ASSISTANT

## 2017-05-02 PROCEDURE — 92960 CARDIOVERSION ELECTRIC EXT: CPT | Performed by: INTERNAL MEDICINE

## 2017-05-02 PROCEDURE — 25010000002 MIDAZOLAM PER 1 MG: Performed by: INTERNAL MEDICINE

## 2017-05-02 PROCEDURE — 85027 COMPLETE CBC AUTOMATED: CPT | Performed by: PHYSICIAN ASSISTANT

## 2017-05-02 PROCEDURE — 83735 ASSAY OF MAGNESIUM: CPT | Performed by: PHYSICIAN ASSISTANT

## 2017-05-02 PROCEDURE — 36415 COLL VENOUS BLD VENIPUNCTURE: CPT

## 2017-05-02 PROCEDURE — 92960 CARDIOVERSION ELECTRIC EXT: CPT

## 2017-05-02 PROCEDURE — 93005 ELECTROCARDIOGRAM TRACING: CPT | Performed by: INTERNAL MEDICINE

## 2017-05-02 RX ORDER — ACETAMINOPHEN 325 MG/1
650 TABLET ORAL EVERY 4 HOURS PRN
Status: DISCONTINUED | OUTPATIENT
Start: 2017-05-02 | End: 2017-05-02 | Stop reason: HOSPADM

## 2017-05-02 RX ORDER — MIDAZOLAM HYDROCHLORIDE 1 MG/ML
INJECTION INTRAMUSCULAR; INTRAVENOUS
Status: DISCONTINUED
Start: 2017-05-02 | End: 2017-05-02 | Stop reason: HOSPADM

## 2017-05-02 RX ORDER — ONDANSETRON 2 MG/ML
4 INJECTION INTRAMUSCULAR; INTRAVENOUS EVERY 6 HOURS PRN
Status: DISCONTINUED | OUTPATIENT
Start: 2017-05-02 | End: 2017-05-02 | Stop reason: HOSPADM

## 2017-05-02 RX ORDER — SODIUM CHLORIDE 0.9 % (FLUSH) 0.9 %
1-10 SYRINGE (ML) INJECTION AS NEEDED
Status: DISCONTINUED | OUTPATIENT
Start: 2017-05-02 | End: 2017-05-02 | Stop reason: HOSPADM

## 2017-05-02 RX ORDER — NALOXONE HYDROCHLORIDE 0.4 MG/ML
INJECTION, SOLUTION INTRAMUSCULAR; INTRAVENOUS; SUBCUTANEOUS
Status: DISCONTINUED
Start: 2017-05-02 | End: 2017-05-02 | Stop reason: WASHOUT

## 2017-05-02 RX ORDER — FLUMAZENIL 0.1 MG/ML
INJECTION INTRAVENOUS
Status: DISCONTINUED
Start: 2017-05-02 | End: 2017-05-02 | Stop reason: WASHOUT

## 2017-05-02 RX ORDER — MIDAZOLAM HYDROCHLORIDE 1 MG/ML
INJECTION INTRAMUSCULAR; INTRAVENOUS
Status: COMPLETED | OUTPATIENT
Start: 2017-05-02 | End: 2017-05-02

## 2017-05-02 RX ADMIN — METHOHEXITAL SODIUM 10 MG: 500 INJECTION, POWDER, LYOPHILIZED, FOR SOLUTION INTRAMUSCULAR; INTRAVENOUS; RECTAL at 13:51

## 2017-05-02 RX ADMIN — MIDAZOLAM HYDROCHLORIDE 1 MG: 1 INJECTION, SOLUTION INTRAMUSCULAR; INTRAVENOUS at 13:50

## 2017-05-02 RX ADMIN — MIDAZOLAM HYDROCHLORIDE 1 MG: 1 INJECTION, SOLUTION INTRAMUSCULAR; INTRAVENOUS at 13:48

## 2017-05-02 RX ADMIN — METHOHEXITAL SODIUM 20 MG: 500 INJECTION, POWDER, LYOPHILIZED, FOR SOLUTION INTRAMUSCULAR; INTRAVENOUS; RECTAL at 13:50

## 2017-05-02 RX ADMIN — METHOHEXITAL SODIUM 20 MG: 500 INJECTION, POWDER, LYOPHILIZED, FOR SOLUTION INTRAMUSCULAR; INTRAVENOUS; RECTAL at 13:48

## 2017-05-02 RX ADMIN — METHOHEXITAL SODIUM 10 MG: 500 INJECTION, POWDER, LYOPHILIZED, FOR SOLUTION INTRAMUSCULAR; INTRAVENOUS; RECTAL at 13:49

## 2017-05-05 ENCOUNTER — TELEPHONE (OUTPATIENT)
Dept: CARDIOLOGY | Facility: CLINIC | Age: 57
End: 2017-05-05

## 2017-05-24 DIAGNOSIS — I48.19 PERSISTENT ATRIAL FIBRILLATION (HCC): Primary | ICD-10-CM

## 2017-05-30 ENCOUNTER — TELEPHONE (OUTPATIENT)
Dept: CARDIOLOGY | Facility: CLINIC | Age: 57
End: 2017-05-30

## 2017-05-30 DIAGNOSIS — I48.19 PERSISTENT ATRIAL FIBRILLATION (HCC): Primary | ICD-10-CM

## 2017-06-05 ENCOUNTER — PREP FOR SURGERY (OUTPATIENT)
Dept: OTHER | Facility: HOSPITAL | Age: 57
End: 2017-06-05

## 2017-06-05 RX ORDER — POTASSIUM CHLORIDE 1.5 G/1.77G
40 POWDER, FOR SOLUTION ORAL AS NEEDED
Status: CANCELLED | OUTPATIENT
Start: 2017-06-05

## 2017-06-05 RX ORDER — POTASSIUM CHLORIDE 750 MG/1
40 CAPSULE, EXTENDED RELEASE ORAL AS NEEDED
Status: CANCELLED | OUTPATIENT
Start: 2017-06-05

## 2017-06-06 ENCOUNTER — APPOINTMENT (OUTPATIENT)
Dept: PREADMISSION TESTING | Facility: HOSPITAL | Age: 57
End: 2017-06-06

## 2017-06-06 DIAGNOSIS — I48.19 PERSISTENT ATRIAL FIBRILLATION (HCC): ICD-10-CM

## 2017-06-06 LAB
ANION GAP SERPL CALCULATED.3IONS-SCNC: 10 MMOL/L (ref 3–11)
BUN BLD-MCNC: 13 MG/DL (ref 9–23)
BUN/CREAT SERPL: 21.7 (ref 7–25)
CA-I SERPL ISE-MCNC: 1.19 MMOL/L (ref 1.12–1.32)
CALCIUM SPEC-SCNC: 10 MG/DL (ref 8.7–10.4)
CHLORIDE SERPL-SCNC: 102 MMOL/L (ref 99–109)
CO2 SERPL-SCNC: 27 MMOL/L (ref 20–31)
CREAT BLD-MCNC: 0.6 MG/DL (ref 0.6–1.3)
GFR SERPL CREATININE-BSD FRML MDRD: 103 ML/MIN/1.73
GLUCOSE BLD-MCNC: 96 MG/DL (ref 70–100)
MAGNESIUM SERPL-MCNC: 2.4 MG/DL (ref 1.3–2.7)
POTASSIUM BLD-SCNC: 3.9 MMOL/L (ref 3.5–5.5)
SODIUM BLD-SCNC: 139 MMOL/L (ref 132–146)

## 2017-06-06 PROCEDURE — 93010 ELECTROCARDIOGRAM REPORT: CPT | Performed by: INTERNAL MEDICINE

## 2017-06-06 NOTE — DISCHARGE INSTRUCTIONS
The following instructions given during Pre Admission Testing visit:    Do not eat or drink anything after MN except for sips of water with your a.m. Prescription meds unless otherwise instructed by your physician.    Glasses and jewelry may be worn, but dentures must be removed prior to cath/procedure.    Leave any items you consider valuable at home.    Family members may wait in [facility] waiting area during procedure.    Bring all medications in their original containers the day of procedure.    Bring photo ID and insurance cards on the day of procedure.    Need to make arrangements for transportation prior to discharge.    The following handouts were given:     Heart Cath pathway (if applicable)   Cardiac Cath booklet published by [facility]    OR appropriate [facility] procedure booklet    If applicable, pt instructed to bring CPAP mask and tubing the day of procedure.  TIKOSYN sheet given

## 2017-06-07 ENCOUNTER — HOSPITAL ENCOUNTER (INPATIENT)
Facility: HOSPITAL | Age: 57
LOS: 2 days | Discharge: HOME | End: 2017-06-09
Attending: INTERNAL MEDICINE | Admitting: INTERNAL MEDICINE

## 2017-06-07 LAB
GLUCOSE BLDC GLUCOMTR-MCNC: 123 MG/DL (ref 70–130)
GLUCOSE BLDC GLUCOMTR-MCNC: 134 MG/DL (ref 70–130)
GLUCOSE BLDC GLUCOMTR-MCNC: 88 MG/DL (ref 70–130)

## 2017-06-07 PROCEDURE — 99222 1ST HOSP IP/OBS MODERATE 55: CPT | Performed by: INTERNAL MEDICINE

## 2017-06-07 PROCEDURE — 93010 ELECTROCARDIOGRAM REPORT: CPT | Performed by: INTERNAL MEDICINE

## 2017-06-07 PROCEDURE — 93005 ELECTROCARDIOGRAM TRACING: CPT | Performed by: INTERNAL MEDICINE

## 2017-06-07 PROCEDURE — 82962 GLUCOSE BLOOD TEST: CPT

## 2017-06-07 PROCEDURE — 93005 ELECTROCARDIOGRAM TRACING: CPT | Performed by: PHYSICIAN ASSISTANT

## 2017-06-07 RX ORDER — DOFETILIDE 0.5 MG/1
500 CAPSULE ORAL ONCE
Status: COMPLETED | OUTPATIENT
Start: 2017-06-07 | End: 2017-06-07

## 2017-06-07 RX ORDER — DOCUSATE SODIUM 100 MG/1
200 CAPSULE, LIQUID FILLED ORAL DAILY
Status: DISCONTINUED | OUTPATIENT
Start: 2017-06-07 | End: 2017-06-09 | Stop reason: HOSPADM

## 2017-06-07 RX ORDER — FUROSEMIDE 20 MG/1
20 TABLET ORAL DAILY PRN
Status: DISCONTINUED | OUTPATIENT
Start: 2017-06-07 | End: 2017-06-09 | Stop reason: HOSPADM

## 2017-06-07 RX ORDER — POTASSIUM CHLORIDE 1.5 G/1.77G
40 POWDER, FOR SOLUTION ORAL AS NEEDED
Status: DISCONTINUED | OUTPATIENT
Start: 2017-06-07 | End: 2017-06-09 | Stop reason: HOSPADM

## 2017-06-07 RX ORDER — DILTIAZEM HYDROCHLORIDE 180 MG/1
360 CAPSULE, COATED, EXTENDED RELEASE ORAL DAILY
Status: DISCONTINUED | OUTPATIENT
Start: 2017-06-07 | End: 2017-06-09

## 2017-06-07 RX ORDER — DOFETILIDE 0.5 MG/1
500 CAPSULE ORAL EVERY 12 HOURS SCHEDULED
Status: DISCONTINUED | OUTPATIENT
Start: 2017-06-08 | End: 2017-06-09 | Stop reason: HOSPADM

## 2017-06-07 RX ORDER — DABIGATRAN ETEXILATE 150 MG/1
150 CAPSULE ORAL 2 TIMES DAILY
Status: DISCONTINUED | OUTPATIENT
Start: 2017-06-07 | End: 2017-06-09 | Stop reason: HOSPADM

## 2017-06-07 RX ORDER — LOSARTAN POTASSIUM 25 MG/1
25 TABLET ORAL DAILY
Status: DISCONTINUED | OUTPATIENT
Start: 2017-06-07 | End: 2017-06-09 | Stop reason: HOSPADM

## 2017-06-07 RX ORDER — PANTOPRAZOLE SODIUM 40 MG/1
40 TABLET, DELAYED RELEASE ORAL
Status: DISCONTINUED | OUTPATIENT
Start: 2017-06-07 | End: 2017-06-09 | Stop reason: HOSPADM

## 2017-06-07 RX ORDER — POTASSIUM CHLORIDE 750 MG/1
40 CAPSULE, EXTENDED RELEASE ORAL AS NEEDED
Status: DISCONTINUED | OUTPATIENT
Start: 2017-06-07 | End: 2017-06-09 | Stop reason: HOSPADM

## 2017-06-07 RX ADMIN — DOFETILIDE 500 MCG: 0.5 CAPSULE ORAL at 21:27

## 2017-06-07 RX ADMIN — DOCUSATE SODIUM 200 MG: 100 CAPSULE, LIQUID FILLED ORAL at 11:09

## 2017-06-07 RX ADMIN — METFORMIN HYDROCHLORIDE 500 MG: 500 TABLET, FILM COATED ORAL at 17:16

## 2017-06-07 RX ADMIN — DABIGATRAN ETEXILATE MESYLATE 150 MG: 150 CAPSULE ORAL at 17:16

## 2017-06-07 RX ADMIN — DOFETILIDE 500 MCG: 0.5 CAPSULE ORAL at 11:08

## 2017-06-07 RX ADMIN — PANTOPRAZOLE SODIUM 40 MG: 40 TABLET, DELAYED RELEASE ORAL at 17:16

## 2017-06-07 NOTE — PLAN OF CARE
Problem: Arrhythmia/Dysrhythmia (Symptomatic) (Adult)  Goal: Signs and Symptoms of Listed Potential Problems Will be Absent or Manageable (Arrhythmia/Dysrhythmia)  Outcome: Ongoing (interventions implemented as appropriate)    Problem: Patient Care Overview (Adult)  Goal: Plan of Care Review  Outcome: Ongoing (interventions implemented as appropriate)    06/07/17 1508   Coping/Psychosocial Response Interventions   Plan Of Care Reviewed With patient   Patient Care Overview   Progress improving   Outcome Evaluation   Outcome Summary/Follow up Plan pt arrived to the floor for tikosyn initiation. 1st dose given and converted from afib to sinus lorraine with 1st degree AV block shortly after and EKG was obtained. no complaints or distress noted and all VSS.

## 2017-06-07 NOTE — PROGRESS NOTES
Discharge Planning Assessment  [facility]     Patient Name: Renetta Horner  MRN: [id number]  Today's Date: 6/7/2017    Admit Date: 6/7/2017          Discharge Needs Assessment       06/07/17 1026    Living Environment    Lives With spouse    Living Arrangements house   Lives in a single story house in [facility]    Home Accessibility no concerns;bed and bath on same level;stairs to enter home    Number of Stairs to Enter Home 1    Stair Railings at Home none    Type of Financial/Environmental Concern none    Transportation Available car;family or friend will provide    Living Environment    Provides Primary Care For no one    Quality Of Family Relationships supportive;helpful;involved    Able to Return to Prior Living Arrangements yes    Discharge Needs Assessment    Concerns To Be Addressed denies needs/concerns at this time    Anticipated Changes Related to Illness none    Equipment Currently Used at Home none    Equipment Needed After Discharge none    Discharge Disposition still a patient    Discharge Contact Information if Applicable [phone]            Discharge Plan       06/07/17 1028    Case Management/Social Work Plan    Plan Home with spouse    Patient/Family In Agreement With Plan yes    Additional Comments Referral for Tikosyn received. Met with pt at bs. She is independent of ADL's. Denies DME or HH. [facility] concerns. Pt agreeable to do the Meds to Beds program.  Pt's Timikaelasyn will require PA. CM completed PA on [email or web address] and is pending. CM will follow.  Susan Ramos RN, CM [phone].        Discharge Placement     No information found        Expected Discharge Date and Time     Expected Discharge Date Expected Discharge Time    Shane 10, 2017               Demographic Summary       06/07/17 1023    Referral Information    Referral Source admission list;physician   Areli initiation    Contact Information    Permission Granted to Share Information With case  manager    Primary Care Physician Information    Name Dr. Gustabo Real            Functional Status       06/07/17 1024    Functional Status Prior    Ambulation 0-->independent    Transferring 0-->independent    Toileting 0-->independent    Bathing 0-->independent    Dressing 0-->independent    Eating 0-->independent    Communication 0-->understands/communicates without difficulty    Swallowing 0-->swallows foods/liquids without difficulty    IADL    Medications independent    Meal Preparation independent    Housekeeping independent    Laundry independent    Shopping independent    Oral Care independent            Psychosocial     None            Abuse/Neglect     None            Legal     None            Substance Abuse     None            Patient Forms     None          Susan Ramos

## 2017-06-07 NOTE — H&P
Electrophysiology History & Physical    Renetta Horner  [id number]  [id number]  [unfilled]  1960    DATE OF ADMISSION: 6/7/2017    Gustabo Quach MD    Problem List:  1. Persistent Atrial Fibrillation s/p ECV X2 with IRAF and failed Flecainide  A. Diagnosed in 2015. CHADS-VASc = 2   B. No a/c secondary to hemarthrosis on Xarelto.   C. Echo show EF of 65%. Stress test negative for ischemia.   D. Restarted on Pradaxa and no major issues 150 mg BID  E. Pulmonary vein ablation/roof line/box in lesion along the posterior wall on 01/18/2017.     Chief complaint: afib and SOA    History of Present Illness     Patient is a 55 yo female with a hx of persistent afib s/p PVA 1/2017 with recurrence despite Flecainide and ECV. She was recently seen in April for ECV and increase of Flecainide to 100mg BID and still had recurrence of afib. She is symptomatic with SOA and fatigue. At times her rates will increase into the 120s. No recent hospitalizations, ER visits, or illnesses. No CP, PND, edema, orthopnea, fevers, chills.     Past Medical History:   Diagnosis Date   • Arthritis    • Atrial fibrillation     CHADS-VASC = 3   • Atrial fibrillation 5/9/2016    1. Persistent Atrial Fibrillation s/p ECV X2 with IRAF and failed Flecainide     A. Diagnosed in 2015. CHADS-VASc = 3 (DM, HTN, female)      B. Xarelto & ASA stopped due to hemarthrosis of knee.        C. Restarted on Pradaxa and no major issues 150 mg BID     D. Echo 4/8/15 showed EF 65%, moderate, Grade II diastolic dysfunction, mild SERA (LA 3.9cm), normal valves     E. Myocardial Perfusion study 4/8/15: negative for ischemia, EF 68%     • Chest pain    • Diabetes mellitus     on oral agents, Type 2 , Patient states last a1c was 5.9 --DX'D 5 YEARS AGO, CHECKS BLOOD SUGAR DAILY AVERAGE 115-122   • Dizziness    • Fatigue    • GERD (gastroesophageal reflux disease)    • Headache    • Heart murmur    • Hypertension     CONTROLLED WITH MEDS PER PT    • Kidney stone     • Palpitations    • PONV (postoperative nausea and vomiting)    • Wears dentures     UPPER PLATE    • Wears dentures     upper   • Wears eyeglasses     reading         Past Surgical History:   Procedure Laterality Date   • ANKLE SURGERY      right    • CARDIAC ELECTROPHYSIOLOGY PROCEDURE N/A 1/18/2017    Procedure: Schedule for a PVA. Cancel the Tikosyn admission.;  Surgeon: Geronimo Rey DO;  Location: [facility] INVASIVE LOCATION;  Service:    • CHOLECYSTECTOMY     • COLONOSCOPY  2011   • HYSTERECTOMY     • JOINT REPLACEMENT      right knee,    • KNEE SURGERY Right     KNEE REPLACEMENT        Prescriptions Prior to Admission   Medication Sig Dispense Refill Last Dose   • cimetidine (TAGAMET) 400 MG tablet Take 400 mg by mouth 2 (Two) Times a Day.   6/6/2017 at Unknown time   • dabigatran etexilate (PRADAXA) 150 MG capsu Take 1 capsule by mouth 2 (Two) Times a Day. 60 capsule 7 6/7/2017 at Unknown time   • diltiaZEM CD (CARDIZEM CD) 360 MG 24 hr capsule Take 1 capsule by mouth Daily. 30 capsule 5 6/7/2017 at Unknown time   • docusate sodium (COLACE) 100 MG capsule Take 200 mg by mouth Daily.   6/6/2017 at Unknown time   • furosemide (LASIX) 20 MG tablet Take 1 tablet by mouth daily as needed (edema). 20 tablet 3 Past Month at Unknown time   • losartan (COZAAR) 25 MG tablet Take 1 tablet by mouth Daily. 30 tablet 6 6/7/2017 at Unknown time   • metFORMIN (GLUCOPHAGE) 500 MG tablet Take 500 mg by mouth 2 (Two) Times a Day With Meals.   6/7/2017 at Unknown time       Current Meds  No current facility-administered medications on file prior to encounter.      Current Outpatient Prescriptions on File Prior to Encounter   Medication Sig Dispense Refill   • cimetidine (TAGAMET) 400 MG tablet Take 400 mg by mouth 2 (Two) Times a Day.     • dabigatran etexilate (PRADAXA) 150 MG capsu Take 1 capsule by mouth 2 (Two) Times a Day. 60 capsule 7   • diltiaZEM CD (CARDIZEM CD) 360 MG 24 hr capsule Take 1 capsule by mouth Daily.  30 capsule 5   • docusate sodium (COLACE) 100 MG capsule Take 200 mg by mouth Daily.     • furosemide (LASIX) 20 MG tablet Take 1 tablet by mouth daily as needed (edema). 20 tablet 3   • losartan (COZAAR) 25 MG tablet Take 1 tablet by mouth Daily. 30 tablet 6   • metFORMIN (GLUCOPHAGE) 500 MG tablet Take 500 mg by mouth 2 (Two) Times a Day With Meals.           Social History     Social History   • Marital status:      Spouse name: N/A   • Number of children: N/A   • Years of education: N/A     Occupational History   • Not on file.     Social History Main Topics   • Smoking status: Former Smoker     Types: Cigarettes   • Smokeless tobacco: Never Used      Comment: QUIT 1997   • Alcohol use No   • Drug use: No   • Sexual activity: Defer     Other Topics Concern   • Not on file     Social History Narrative       Family History   Problem Relation Age of Onset   • Coronary artery disease Mother    • Breast cancer Mother    • Diabetes Mother    • Other Mother      Benign prostatic hypertrophy           REVIEW OF SYSTEMS:   14 point ROS was performed and is negative except as outlined in HPI           Objective:   There were no vitals filed for this visit.  There is no height or weight on file to calculate BMI.      General Appearance:    Alert, cooperative, in no acute distress   Head:    Normocephalic, without obvious abnormality, atraumatic   Eyes:            Lids and lashes normal, conjunctivae and sclerae normal, no   icterus, no pallor, corneas clear, PERRLA   Ears:    Ears appear intact with no abnormalities noted   Throat:   No oral lesions, no thrush, oral mucosa moist   Neck:   No adenopathy, supple, trachea midline, no thyromegaly, no   carotid bruit, no JVD   Back:     No kyphosis present, no scoliosis present, no skin lesions, erythema or scars, no tenderness to percussion or palpation, range of motion normal   Lungs:     Clear to auscultation,respirations regular, even and unlabored    Heart:     Irregular rhythm and normal rate, normal S1 and S2, no murmur, no gallop, no rub, no click   Chest Wall:    No abnormalities observed   Abdomen:     Normal bowel sounds, no masses, no organomegaly, soft        non-tender, non-distended, no guarding, no rebound  tenderness   Extremities:   Moves all extremities well, no edema, no cyanosis, no redness   Pulses:   Pulses palpable and equal bilaterally. Normal radial, carotid, femoral, dorsalis pedis and posterior tibial pulses bilaterally. Normal abdominal aorta   Skin:   No bleeding, bruising or rash           Lab Review:      Results Review:     I reviewed the patient's new clinical results.          Results from last 7 days  Lab Units 06/06/17  1553   SODIUM mmol/L 139   POTASSIUM mmol/L 3.9   CHLORIDE mmol/L 102   TOTAL CO2 mmol/L 27.0   BUN mg/dL 13   CREATININE mg/dL 0.60   CALCIUM mg/dL 10.0   GLUCOSE mg/dL 96       Results from last 7 days  Lab Units 06/06/17  1553   SODIUM mmol/L 139   POTASSIUM mmol/L 3.9   CHLORIDE mmol/L 102   TOTAL CO2 mmol/L 27.0   BUN mg/dL 13   CREATININE mg/dL 0.60   GLUCOSE mg/dL 96   CALCIUM mg/dL 10.0       EKG: afib. HR= 94 bpm. QTc= 435ms     I personally viewed and interpreted the patient's EKG/Telemetry data    EKG showing now SR with rate 61 bpm with QTc about 440-450 msec, 1 st degree block.     Assessment:    Active Problems:    Persistent atrial fibrillation       Plan:       1. Persistent afib s/p PVA w/ recurrence despite Flecainide 100mg BID and recent ECV   - Will start patient on Tikosyn 500mcg BID w/ normal CrCl and QTc of about 435ms this AM   - Continue Cardizem 360mg daily   - Pradaxa 150mg BID for CHADS-VASC of 2   - Plan for ECV on Friday morning if still in afib.       Scribed for Geronimo Rey DO by Susan Mckenzie PA-C. 6/7/2017  9:18 AM      LOPEZ Fry  [facility]  06/07/17  9:18 AM     STAFF:  Patient is a 56-year-old female with a history of persistent atrial fibrillation status post  pulm vein ablation roof line and box in lesion along the posterior wall 01/18/2017.  She had been maintained on flecainide and an external cardioversion but again has reverted back to atrial fibrillation with symptoms.  Patient was started on dofetilide 500 my grams twice a day due to her normal chronic clearance and QTc okay while in atrial fibrillation.  She has converted to normal sinus rhythm with a heart rate of 61 bpm IL interval 262 ms with a QRS duration 110 ms after just one dose of dofetilide.  QTc is okay currently with a measurement of 440-450 ms.  We'll continue on current dosage and continue watch patient closely.  If no issues likely be discharged on Friday. In the long run may need to reconsider redo pulm vein ablation if recurrent episodes of Afib. Continue Cardizem and NOAC    I, Geronimo Rey , personally performed the services described in this documentation as scribed by the above named individual in my presence, and it is both accurate and complete.  6/7/2017  4:10 PM    Geronimo Rey DO  4:10 PM  06/07/17

## 2017-06-07 NOTE — PROGRESS NOTES
Tikosyn Initiation - Pharmacy Evaluation    Name- Renetta Horner  Age- 56 y.o.  Sex- female  HT -  66 in  Wt -  130 kg      Evaluation of Drug-Drug Interactions     Previous antiarrythmic medications D/C 'ed prior to admission      Amiodarone D/C 'ed >3 weeks   Sotalol D/C 'ed > 48hr   Class I antiarrythmic's (Disopyramide,Flecainide, Mexiletine, Propafenone) D/C 'ed >48hr      Proceed - Yes    Drug-Drug Interactions with admission regimen    The following medications are recognized to prolong QT interval, however the medication continue during initial Dofetilide dosing:  - furosemide  The following medications may increase Dofetilide serum concentrations and can be co-administered:  -Metformin,    Laboratory    Results from last 7 days     Lab Units 06/06/17  1553   SODIUM mmol/L 139   POTASSIUM mmol/L 3.9   CHLORIDE mmol/L 102   TOTAL CO2 mmol/L 27.0   BUN mg/dL 13   CREATININE mg/dL 0.60   GLUCOSE mg/dL 96   CALCIUM mg/dL 10.0     Results from last 7 days     Lab Units 06/06/17  1553   MAGNESIUM mg/dL 2.4   Electrolyte replacement ordered:   Mg <2.0 - No     K <4.0  - Yes    Est CrCl = >125 ml/min  (calculated with Cockroft-Gault equation using actual body weight and serum creatinine for calculation)  (laboratory values from previous 24 hours can be used)    Initial Dose    QTc = 435     QTc </= 440 msec -  Yes   Proceed - Yes    Initial Dose:    Yes - CrCl >60      Dofetilide 500mcg po q12h     (dosed at 10 hours intervals until administration times between 7-9)    Thank you,  James Womack [facility]  6/7/2017  9:41 AM

## 2017-06-08 LAB
ANION GAP SERPL CALCULATED.3IONS-SCNC: 7 MMOL/L (ref 3–11)
BUN BLD-MCNC: 13 MG/DL (ref 9–23)
BUN/CREAT SERPL: 21.7 (ref 7–25)
CALCIUM SPEC-SCNC: 9.6 MG/DL (ref 8.7–10.4)
CHLORIDE SERPL-SCNC: 103 MMOL/L (ref 99–109)
CO2 SERPL-SCNC: 26 MMOL/L (ref 20–31)
CREAT BLD-MCNC: 0.6 MG/DL (ref 0.6–1.3)
GFR SERPL CREATININE-BSD FRML MDRD: 103 ML/MIN/1.73
GLUCOSE BLD-MCNC: 118 MG/DL (ref 70–100)
GLUCOSE BLDC GLUCOMTR-MCNC: 118 MG/DL (ref 70–130)
GLUCOSE BLDC GLUCOMTR-MCNC: 122 MG/DL (ref 70–130)
GLUCOSE BLDC GLUCOMTR-MCNC: 129 MG/DL (ref 70–130)
GLUCOSE BLDC GLUCOMTR-MCNC: 133 MG/DL (ref 70–130)
MAGNESIUM SERPL-MCNC: 1.9 MG/DL (ref 1.3–2.7)
POTASSIUM BLD-SCNC: 4.2 MMOL/L (ref 3.5–5.5)
SODIUM BLD-SCNC: 136 MMOL/L (ref 132–146)

## 2017-06-08 PROCEDURE — 93010 ELECTROCARDIOGRAM REPORT: CPT | Performed by: INTERNAL MEDICINE

## 2017-06-08 PROCEDURE — 80048 BASIC METABOLIC PNL TOTAL CA: CPT | Performed by: PHYSICIAN ASSISTANT

## 2017-06-08 PROCEDURE — 93005 ELECTROCARDIOGRAM TRACING: CPT | Performed by: PHYSICIAN ASSISTANT

## 2017-06-08 PROCEDURE — 82962 GLUCOSE BLOOD TEST: CPT

## 2017-06-08 PROCEDURE — 83735 ASSAY OF MAGNESIUM: CPT | Performed by: PHYSICIAN ASSISTANT

## 2017-06-08 PROCEDURE — 93005 ELECTROCARDIOGRAM TRACING: CPT | Performed by: INTERNAL MEDICINE

## 2017-06-08 PROCEDURE — 99232 SBSQ HOSP IP/OBS MODERATE 35: CPT | Performed by: INTERNAL MEDICINE

## 2017-06-08 RX ORDER — MAGNESIUM SULFATE HEPTAHYDRATE 40 MG/ML
2 INJECTION, SOLUTION INTRAVENOUS AS NEEDED
Status: DISCONTINUED | OUTPATIENT
Start: 2017-06-08 | End: 2017-06-09 | Stop reason: HOSPADM

## 2017-06-08 RX ORDER — ACETAMINOPHEN 325 MG/1
650 TABLET ORAL EVERY 6 HOURS PRN
Status: DISCONTINUED | OUTPATIENT
Start: 2017-06-08 | End: 2017-06-09 | Stop reason: HOSPADM

## 2017-06-08 RX ORDER — MAGNESIUM SULFATE HEPTAHYDRATE 40 MG/ML
4 INJECTION, SOLUTION INTRAVENOUS AS NEEDED
Status: DISCONTINUED | OUTPATIENT
Start: 2017-06-08 | End: 2017-06-09 | Stop reason: HOSPADM

## 2017-06-08 RX ADMIN — PANTOPRAZOLE SODIUM 40 MG: 40 TABLET, DELAYED RELEASE ORAL at 06:41

## 2017-06-08 RX ADMIN — DABIGATRAN ETEXILATE MESYLATE 150 MG: 150 CAPSULE ORAL at 17:09

## 2017-06-08 RX ADMIN — DABIGATRAN ETEXILATE MESYLATE 150 MG: 150 CAPSULE ORAL at 09:05

## 2017-06-08 RX ADMIN — ACETAMINOPHEN 650 MG: 325 TABLET, FILM COATED ORAL at 21:03

## 2017-06-08 RX ADMIN — METFORMIN HYDROCHLORIDE 500 MG: 500 TABLET, FILM COATED ORAL at 09:05

## 2017-06-08 RX ADMIN — DOFETILIDE 500 MCG: 0.5 CAPSULE ORAL at 09:05

## 2017-06-08 RX ADMIN — LOSARTAN POTASSIUM 25 MG: 25 TABLET, FILM COATED ORAL at 09:05

## 2017-06-08 RX ADMIN — METFORMIN HYDROCHLORIDE 500 MG: 500 TABLET, FILM COATED ORAL at 17:09

## 2017-06-08 RX ADMIN — MAGNESIUM SULFATE HEPTAHYDRATE 4 G: 40 INJECTION, SOLUTION INTRAVENOUS at 07:26

## 2017-06-08 RX ADMIN — DOFETILIDE 500 MCG: 0.5 CAPSULE ORAL at 21:03

## 2017-06-08 RX ADMIN — DOCUSATE SODIUM 200 MG: 100 CAPSULE, LIQUID FILLED ORAL at 09:05

## 2017-06-08 RX ADMIN — DILTIAZEM HYDROCHLORIDE 360 MG: 180 CAPSULE, EXTENDED RELEASE ORAL at 09:05

## 2017-06-08 NOTE — PLAN OF CARE
Problem: Arrhythmia/Dysrhythmia (Symptomatic) (Adult)  Goal: Signs and Symptoms of Listed Potential Problems Will be Absent or Manageable (Arrhythmia/Dysrhythmia)  Outcome: Ongoing (interventions implemented as appropriate)    Problem: Patient Care Overview (Adult)  Goal: Plan of Care Review  Outcome: Ongoing (interventions implemented as appropriate)    06/08/17 1526   Coping/Psychosocial Response Interventions   Plan Of Care Reviewed With patient   Patient Care Overview   Progress improving   Outcome Evaluation   Outcome Summary/Follow up Plan pt has had no complaints or distress today. mag replaced for a level of 1.9, will recheck level in AM. third dose of tikosyn given this morning and remains NSR on the monitor with a 1st degree block. last QT was 440. all VSS.

## 2017-06-08 NOTE — PAYOR COMM NOTE
"Sepideh Horner (56 y.o. Female)     Kortney Orr, TANYA Case Management, [facility]  [phone]    Initial Clinicals     Date of Birth Social Security Number Address Home Phone MRN    1960  [address] [place] [id number] [phone] [id number]    Protestant Marital Status          [employer]        Admission Date Admission Type Admitting Provider Attending Provider Department, Room/Bed    6/7/17 Elective Geronimo Rey, DO KrissyGeronimo wilson DO [facility] 6A, N612/1    Discharge Date Discharge Disposition Discharge Destination                      Attending Provider: Geronimo Rey DO     Allergies:  Atenolol, Indomethacin, Lisinopril, Penicillins, Other, Sulfa Antibiotics, Ultram [Tramadol], Codeine    Isolation:  None   Infection:  None   Code Status:  Not on file    Ht:  66\" (167.6 cm)   Wt:  287 lb (130 kg)    Admission Cmt:  None   Principal Problem:  None                Active Insurance as of 6/7/2017     Primary Coverage     Payor Plan Insurance Group Employer/Plan Group    [place]      Payor Plan Address Payor Plan Phone Number Effective From Effective To    [address] [address]  1/1/2014     [facility] [place] [address]       Subscriber Name Subscriber Birth Date Member ID       SEPIDEH HORNER 1960 [id number]                 Emergency Contacts      (Rel.) Home Phone Work Phone Mobile Phone    Marquis Horner (Spouse) [phone] -- [phone]               History & Physical      Geronimo Rey DO at 6/7/2017  9:18 AM          Electrophysiology History & Physical    Sepideh Horner  N612/1  [id number]  [unfilled]  1960    DATE OF ADMISSION: 6/7/2017    Gustabo Quach MD    Problem List:  1. Persistent Atrial Fibrillation s/p ECV X2 with IRAF and failed Flecainide  A. Diagnosed in 2015. CHADS-VASc = 2   B. No a/c secondary to hemarthrosis on Xarelto.   C. Echo show EF of 65%. Stress test " negative for ischemia.   D. Restarted on Pradaxa and no major issues 150 mg BID  E. Pulmonary vein ablation/roof line/box in lesion along the posterior wall on 01/18/2017.     Chief complaint: afib and SOA    History of Present Illness     Patient is a 55 yo female with a hx of persistent afib s/p PVA 1/2017 with recurrence despite Flecainide and ECV. She was recently seen in April for ECV and increase of Flecainide to 100mg BID and still had recurrence of afib. She is symptomatic with SOA and fatigue. At times her rates will increase into the 120s. No recent hospitalizations, ER visits, or illnesses. No CP, PND, edema, orthopnea, fevers, chills.     Past Medical History:   Diagnosis Date   • Arthritis    • Atrial fibrillation     CHADS-VASC = 3   • Atrial fibrillation 5/9/2016    1. Persistent Atrial Fibrillation s/p ECV X2 with IRAF and failed Flecainide     A. Diagnosed in 2015. CHADS-VASc = 3 (DM, HTN, female)      B. Xarelto & ASA stopped due to hemarthrosis of knee.        C. Restarted on Pradaxa and no major issues 150 mg BID     D. Echo 4/8/15 showed EF 65%, moderate, Grade II diastolic dysfunction, mild SERA (LA 3.9cm), normal valves     E. Myocardial Perfusion study 4/8/15: negative for ischemia, EF 68%     • Chest pain    • Diabetes mellitus     on oral agents, Type 2 , Patient states last a1c was 5.9 --DX'D 5 YEARS AGO, CHECKS BLOOD SUGAR DAILY AVERAGE 115-122   • Dizziness    • Fatigue    • GERD (gastroesophageal reflux disease)    • Headache    • Heart murmur    • Hypertension     CONTROLLED WITH MEDS PER PT    • Kidney stone    • Palpitations    • PONV (postoperative nausea and vomiting)    • Wears dentures     UPPER PLATE    • Wears dentures     upper   • Wears eyeglasses     reading         Past Surgical History:   Procedure Laterality Date   • ANKLE SURGERY      right    • CARDIAC ELECTROPHYSIOLOGY PROCEDURE N/A 1/18/2017    Procedure: Schedule for a PVA. Cancel the [facility] admission.;  Surgeon:  Geronimo Rey DO;  Location: [facility] INVASIVE LOCATION;  Service:    • CHOLECYSTECTOMY     • COLONOSCOPY  2011   • HYSTERECTOMY     • JOINT REPLACEMENT      right knee,    • KNEE SURGERY Right     KNEE REPLACEMENT        Prescriptions Prior to Admission   Medication Sig Dispense Refill Last Dose   • cimetidine (TAGAMET) 400 MG tablet Take 400 mg by mouth 2 (Two) Times a Day.   6/6/2017 at Unknown time   • dabigatran etexilate (PRADAXA) 150 MG capsu Take 1 capsule by mouth 2 (Two) Times a Day. 60 capsule 7 6/7/2017 at Unknown time   • diltiaZEM CD (CARDIZEM CD) 360 MG 24 hr capsule Take 1 capsule by mouth Daily. 30 capsule 5 6/7/2017 at Unknown time   • docusate sodium (COLACE) 100 MG capsule Take 200 mg by mouth Daily.   6/6/2017 at Unknown time   • furosemide (LASIX) 20 MG tablet Take 1 tablet by mouth daily as needed (edema). 20 tablet 3 Past Month at Unknown time   • losartan (COZAAR) 25 MG tablet Take 1 tablet by mouth Daily. 30 tablet 6 6/7/2017 at Unknown time   • metFORMIN (GLUCOPHAGE) 500 MG tablet Take 500 mg by mouth 2 (Two) Times a Day With Meals.   6/7/2017 at Unknown time       Current Meds  No current facility-administered medications on file prior to encounter.      Current Outpatient Prescriptions on File Prior to Encounter   Medication Sig Dispense Refill   • cimetidine (TAGAMET) 400 MG tablet Take 400 mg by mouth 2 (Two) Times a Day.     • dabigatran etexilate (PRADAXA) 150 MG capsu Take 1 capsule by mouth 2 (Two) Times a Day. 60 capsule 7   • diltiaZEM CD (CARDIZEM CD) 360 MG 24 hr capsule Take 1 capsule by mouth Daily. 30 capsule 5   • docusate sodium (COLACE) 100 MG capsule Take 200 mg by mouth Daily.     • furosemide (LASIX) 20 MG tablet Take 1 tablet by mouth daily as needed (edema). 20 tablet 3   • losartan (COZAAR) 25 MG tablet Take 1 tablet by mouth Daily. 30 tablet 6   • metFORMIN (GLUCOPHAGE) 500 MG tablet Take 500 mg by mouth 2 (Two) Times a Day With Meals.           Social History      Social History   • Marital status:      Spouse name: N/A   • Number of children: N/A   • Years of education: N/A     Occupational History   • Not on file.     Social History Main Topics   • Smoking status: Former Smoker     Types: Cigarettes   • Smokeless tobacco: Never Used      Comment: QUIT 1997   • Alcohol use No   • Drug use: No   • Sexual activity: Defer     Other Topics Concern   • Not on file     Social History Narrative       Family History   Problem Relation Age of Onset   • Coronary artery disease Mother    • Breast cancer Mother    • Diabetes Mother    • Other Mother      Benign prostatic hypertrophy           REVIEW OF SYSTEMS:   14 point ROS was performed and is negative except as outlined in HPI           Objective:   There were no vitals filed for this visit.  There is no height or weight on file to calculate BMI.      General Appearance:    Alert, cooperative, in no acute distress   Head:    Normocephalic, without obvious abnormality, atraumatic   Eyes:            Lids and lashes normal, conjunctivae and sclerae normal, no   icterus, no pallor, corneas clear, PERRLA   Ears:    Ears appear intact with no abnormalities noted   Throat:   No oral lesions, no thrush, oral mucosa moist   Neck:   No adenopathy, supple, trachea midline, no thyromegaly, no   carotid bruit, no JVD   Back:     No kyphosis present, no scoliosis present, no skin lesions, erythema or scars, no tenderness to percussion or palpation, range of motion normal   Lungs:     Clear to auscultation,respirations regular, even and unlabored    Heart:    Irregular rhythm and normal rate, normal S1 and S2, no murmur, no gallop, no rub, no click   Chest Wall:    No abnormalities observed   Abdomen:     Normal bowel sounds, no masses, no organomegaly, soft        non-tender, non-distended, no guarding, no rebound  tenderness   Extremities:   Moves all extremities well, no edema, no cyanosis, no redness   Pulses:   Pulses palpable and  equal bilaterally. Normal radial, carotid, femoral, dorsalis pedis and posterior tibial pulses bilaterally. Normal abdominal aorta   Skin:   No bleeding, bruising or rash           Lab Review:      Results Review:     I reviewed the patient's new clinical results.          Results from last 7 days  Lab Units 06/06/17  1553   SODIUM mmol/L 139   POTASSIUM mmol/L 3.9   CHLORIDE mmol/L 102   TOTAL CO2 mmol/L 27.0   BUN mg/dL 13   CREATININE mg/dL 0.60   CALCIUM mg/dL 10.0   GLUCOSE mg/dL 96       Results from last 7 days  Lab Units 06/06/17  1553   SODIUM mmol/L 139   POTASSIUM mmol/L 3.9   CHLORIDE mmol/L 102   TOTAL CO2 mmol/L 27.0   BUN mg/dL 13   CREATININE mg/dL 0.60   GLUCOSE mg/dL 96   CALCIUM mg/dL 10.0       EKG: afib. HR= 94 bpm. QTc= 435ms     I personally viewed and interpreted the patient's EKG/Telemetry data    EKG showing now SR with rate 61 bpm with QTc about 440-450 msec, 1 st degree block.     Assessment:    Active Problems:    Persistent atrial fibrillation       Plan:       1. Persistent afib s/p PVA w/ recurrence despite Flecainide 100mg BID and recent ECV   - Will start patient on Tikosyn 500mcg BID w/ normal CrCl and QTc of about 435ms this AM   - Continue Cardizem 360mg daily   - Pradaxa 150mg BID for CHADS-VASC of 2   - Plan for ECV on Friday morning if still in afib.       Scribed for Geronimo Rey DO by Susan Mckenzie PA-C. 6/7/2017  9:18 AM      LOPEZ Fry  [facility]  06/07/17  9:18 AM     STAFF:  Patient is a 56-year-old female with a history of persistent atrial fibrillation status post pulm vein ablation roof line and box in lesion along the posterior wall 01/18/2017.  She had been maintained on flecainide and an external cardioversion but again has reverted back to atrial fibrillation with symptoms.  Patient was started on dofetilide 500 my grams twice a day due to her normal chronic clearance and QTc okay while in atrial fibrillation.  She has converted to  normal sinus rhythm with a heart rate of 61 bpm NH interval 262 ms with a QRS duration 110 ms after just one dose of dofetilide.  QTc is okay currently with a measurement of 440-450 ms.  We'll continue on current dosage and continue watch patient closely.  If no issues likely be discharged on Friday. In the long run may need to reconsider redo pulm vein ablation if recurrent episodes of Afib. Continue Cardizem and NOAC    I, Geronimo Rey , personally performed the services described in this documentation as scribed by the above named individual in my presence, and it is both accurate and complete.  6/7/2017  4:10 PM    Geronimo Rey DO  4:10 PM  06/07/17               Electronically signed by Geronimo Rey DO at 6/7/2017  4:33 PM        Vital Signs (last 24 hours)       06/07 0700  -  06/08 0659 06/08 0700  -  06/08 1106   Most Recent    Temp (°F) 97.4 -  98.2      98.6     98.6 (37)    Heart Rate 59 -  100      62     62    Resp 16 -  22      16     16    /65 -  151/88      132/59     132/59    SpO2 (%)   98       98          Hospital Medications (active)       Dose Frequency Start End    dabigatran etexilate (PRADAXA) capsule 150 mg 150 mg 2 Times Daily 6/7/2017     Sig - Route: Take 1 capsule by mouth 2 (Two) Times a Day. - Oral    diltiaZEM CD (CARDIZEM CD) 24 hr capsule 360 mg 360 mg Daily 6/7/2017     Sig - Route: Take 2 capsules by mouth Daily. - Oral    docusate sodium (COLACE) capsule 200 mg 200 mg Daily 6/7/2017     Sig - Route: Take 2 capsules by mouth Daily. - Oral    dofetilide (TIKOSYN) capsule 500 mcg 500 mcg Once 6/7/2017 6/7/2017    Sig - Route: Take 1 capsule by mouth 1 (One) Time. - Oral    dofetilide (TIKOSYN) capsule 500 mcg 500 mcg Once 6/7/2017 6/7/2017    Sig - Route: Take 1 capsule by mouth 1 (One) Time. - Oral    dofetilide (TIKOSYN) capsule 500 mcg 500 mcg Every 12 Hours Scheduled 6/8/2017     Sig - Route: Take 1 capsule by mouth Every 12 (Twelve) Hours. - Oral    furosemide  "(LASIX) tablet 20 mg 20 mg Daily PRN 6/7/2017     Sig - Route: Take 1 tablet by mouth Daily As Needed (edema). - Oral    losartan (COZAAR) tablet 25 mg 25 mg Daily 6/7/2017     Sig - Route: Take 1 tablet by mouth Daily. - Oral    Magnesium Sulfate 2 gram Bolus, followed by 8 gram infusion (total Mg dose 10 grams)- Mg less than or equal to 1mg/dL 2 g As Needed 6/8/2017     Sig - Route: Infuse 50 mL into a venous catheter As Needed (Mg less than or equal to 1mg/dL). - Intravenous    Linked Group 1:  \"Or\" Linked Group Details        magnesium sulfate 4 gram infusion- Mg 1.6-1.9 mg/dL 4 g As Needed 6/8/2017     Sig - Route: Infuse 100 mL into a venous catheter As Needed (Mg 1.6-1.9 mg/dL). - Intravenous    Linked Group 1:  \"Or\" Linked Group Details        Magnesium Sulfate 6 gram Infusion (2 gm x 3) -Mg 1.1 -1.5 mg/dL 2 g As Needed 6/8/2017     Sig - Route: Infuse 50 mL into a venous catheter As Needed (Mg 1.1 -1.5 mg/dL). - Intravenous    Linked Group 1:  \"Or\" Linked Group Details        metFORMIN (GLUCOPHAGE) tablet 500 mg 500 mg 2 Times Daily With Meals 6/7/2017     Sig - Route: Take 1 tablet by mouth 2 (Two) Times a Day With Meals. - Oral    pantoprazole (PROTONIX) EC tablet 40 mg 40 mg Every Early Morning 6/7/2017     Sig - Route: Take 1 tablet by mouth Every Morning. - Oral    Pharmacy Consult  Once As Needed 6/7/2017     Sig - Route: 1 (One) Time As Needed for Consult. - Does not apply    Pharmacy Meds to Bed Consult  Daily (Monday-Friday) 6/7/2017     Sig - Route: Daily (Monday-Friday). - Does not apply    potassium chloride (KLOR-CON) packet 40 mEq 40 mEq As Needed 6/7/2017     Sig - Route: Take 40 mEq by mouth As Needed (per protocol). - Oral    Linked Group 2:  \"Or\" Linked Group Details        potassium chloride (MICRO-K) CR capsule 40 mEq 40 mEq As Needed 6/7/2017     Sig - Route: Take 4 capsules by mouth As Needed (potassium replacement.  see admin instructions). - Oral    Linked Group 2:  \"Or\" Linked Group " Details                Lab Results (last 24 hours)     Procedure Component Value Units Date/Time    POC Glucose Fingerstick [[id number]]  (Normal) Collected:  06/07/17 1147    Specimen:  Blood Updated:  06/07/17 1154     Glucose 88 mg/dL     Narrative:       Meter: [id number] : [id number] Rosario Roxana L    POC Glucose Fingerstick [[id number]]  (Normal) Collected:  06/07/17 1551    Specimen:  Blood Updated:  06/07/17 1552     Glucose 123 mg/dL     Narrative:       Meter: [id number] : [id number] Rosario Roxana L    POC Glucose Fingerstick [[id number]]  (Abnormal) Collected:  06/07/17 1921    Specimen:  Blood Updated:  06/07/17 1923     Glucose 134 (H) mg/dL     Narrative:       Meter: [id number] : [id number] Boxfordrosaline GARCIA    Basic Metabolic Panel [[id number]]  (Abnormal) Collected:  06/08/17 0443    Specimen:  Blood Updated:  06/08/17 0536     Glucose 118 (H) mg/dL      BUN 13 mg/dL      Creatinine 0.60 mg/dL      Sodium 136 mmol/L      Potassium 4.2 mmol/L      Chloride 103 mmol/L      CO2 26.0 mmol/L      Calcium 9.6 mg/dL      eGFR Non African Amer 103 mL/min/1.73      BUN/Creatinine Ratio 21.7     Anion Gap 7.0 mmol/L     Narrative:       National Kid[employer] Guidelines    Stage     Description        GFR  1         Normal or High     90+  2         Mild decrease      60-89  3         Moderate decrease  30-59  4         Severe decrease    15-29  5         Kidney failure     <15    Magnesium [[id number]]  (Normal) Collected:  06/08/17 0443    Specimen:  Blood Updated:  06/08/17 0536     Magnesium 1.9 mg/dL     POC Glucose Fingerstick [[id number]]  (Normal) Collected:  06/08/17 0710    Specimen:  Blood Updated:  06/08/17 0713     Glucose 129 mg/dL     Narrative:       Meter: [id number] : [id number] [employer]        Imaging Results (last 24 hours)     ** No results found for the last 24 hours. **        ECG/EMG Results (last 24 hours)     Procedure Component Value Units Date/Time    ECG 12 Lead  [[id number]] Collected:  06/07/17 1401     Updated:  06/07/17 1409    Narrative:       Test Reason : 3 hrs after first tikosyn dose  Blood Pressure : **/** mmHG  Vent. Rate : 061 BPM     Atrial Rate : 061 BPM     P-R Int : 262 ms          QRS Dur : 110 ms      QT Int : 476 ms       P-R-T Axes : 036 092 091 degrees     QTc Int : 479 ms    Sinus rhythm with 1st degree AV block with premature atrial complexes  Rightward axis  Borderline ECG  When compared with ECG of 07-JUN-2017 09:02, (Unconfirmed)  Sinus rhythm has replaced Atrial fibrillation  Vent. rate has decreased BY  33 BPM  Nonspecific T wave abnormality now evident in Lateral leads    Referred By:  ILDA           Confirmed By:     ECG 12 Lead [[id number]] Collected:  06/08/17 0336     Updated:  06/08/17 0703    Narrative:       Test Reason : Tikosyn initiation  Blood Pressure : **/** mmHG  Vent. Rate : 073 BPM     Atrial Rate : 073 BPM     P-R Int : 236 ms          QRS Dur : 106 ms      QT Int : 464 ms       P-R-T Axes : 057 090 087 degrees     QTc Int : 511 ms    Sinus rhythm with marked sinus arrhythmia with 1st degree AV block  Rightward axis  Cannot rule out Anterior infarct , age undetermined  Prolonged QT  Abnormal ECG  When compared with ECG of 07-JUN-2017 14:01, (Unconfirmed)  premature atrial complexes are no longer present    Referred By:  SARAY           Confirmed By:     ECG 12 Lead [[id number]] Collected:  06/08/17 0924     Updated:  06/08/17 0925    Narrative:       Test Reason : monitoring QT  Blood Pressure : **/** mmHG  Vent. Rate : 071 BPM     Atrial Rate : 071 BPM     P-R Int : 200 ms          QRS Dur : 114 ms      QT Int : 450 ms       P-R-T Axes : 080 081 084 degrees     QTc Int : 489 ms    Normal sinus rhythm  Prolonged QT  Abnormal ECG  When compared with ECG of 08-JUN-2017 03:36, (Unconfirmed)  LA interval has decreased    Referred By:  SARAY           Confirmed By:           Physician Progress Notes (last 24 hours) (Notes from  6/7/2017 11:07 AM through 6/8/2017 11:07 AM)     No notes of this type exist for this encounter.        Consult Notes (last 24 hours) (Notes from 6/7/2017 11:07 AM through 6/8/2017 11:07 AM)     No notes of this type exist for this encounter.

## 2017-06-08 NOTE — PROGRESS NOTES
[facility]  Cardiovascular Progress Note  Renetta Horner  N612/1  [id number]  1960    DATE OF ADMISSION: 6/7/2017  DATE OF FOLLOW UP:  6/8/17    Gustabo Quach MD    Subjective:     Patient ID: Renetta Horner is a 56 y.o. female.    Chief Complaint: afib f/u     Allergies   Allergen Reactions   • Atenolol Shortness Of Breath     Hard to breath   • Indomethacin Shortness Of Breath     , HIVES    • Lisinopril Shortness Of Breath   • Penicillins Anaphylaxis   • Other Itching     [place]-- hives and vomiting   • Sulfa Antibiotics Nausea And Vomiting   • Ultram [Tramadol] Nausea And Vomiting   • Codeine Nausea Only     ITCHING        Current Facility-Administered Medications:   •  dabigatran etexilate (PRADAXA) capsule 150 mg, 150 mg, Oral, BID, LOPEZ Crowder, 150 mg at 06/07/17 1716  •  diltiaZEM CD (CARDIZEM CD) 24 hr capsule 360 mg, 360 mg, Oral, Daily, LOPEZ Crowder  •  docusate sodium (COLACE) capsule 200 mg, 200 mg, Oral, Daily, LOPEZ Crowder, 200 mg at 06/07/17 1109  •  dofetilide (TIKOSYN) capsule 500 mcg, 500 mcg, Oral, Q12H, LOPEZ Crowder  •  furosemide (LASIX) tablet 20 mg, 20 mg, Oral, Daily PRN, LOPEZ Crowder  •  losartan (COZAAR) tablet 25 mg, 25 mg, Oral, Daily, LOPEZ Crowder  •  Magnesium Sulfate 2 gram Bolus, followed by 8 gram infusion (total Mg dose 10 grams)- Mg less than or equal to 1mg/dL, 2 g, Intravenous, PRN **OR** Magnesium Sulfate 6 gram Infusion (2 gm x 3) -Mg 1.1 -1.5 mg/dL, 2 g, Intravenous, PRN **OR** magnesium sulfate 4 gram infusion- Mg 1.6-1.9 mg/dL, 4 g, Intravenous, PRN, Geronimo Rey DO, Last Rate: 25 mL/hr at 06/08/17 0726, 4 g at 06/08/17 0726  •  metFORMIN (GLUCOPHAGE) tablet 500 mg, 500 mg, Oral, BID With Meals, LOPEZ Crowder, 500 mg at 06/07/17 1716  •  pantoprazole (PROTONIX) EC tablet 40 mg, 40 mg, Oral, Q AM, Geronimo Rey DO, 40 mg at 06/08/17 0641  •  Pharmacy Consult, , Does not apply,  "Once PRN, LOPEZ Crowder  •  Pharmacy Meds to Bed Consult, , Does not apply, Daily, James Womack [facility]  •  potassium chloride (MICRO-K) CR capsule 40 mEq, 40 mEq, Oral, PRN **OR** potassium chloride (KLOR-CON) packet 40 mEq, 40 mEq, Oral, PRN, LOPEZ Crowder    History of Present Illness  Patient ok   No major issues noted      ROS   14 point ROS negative except as outlined in problem list, HPI and other parts of the note.    Procedures       Objective:       Vitals:    06/07/17 1500 06/07/17 1920 06/08/17 0329 06/08/17 0710   BP: 127/58 123/65 141/86 132/59   BP Location: Left arm   Right arm   Patient Position: Lying   Sitting   Pulse: 59 59 65 62   Resp: 16 20 22 16   Temp: 97.5 °F (36.4 °C) 98.2 °F (36.8 °C) 97.9 °F (36.6 °C) 98.6 °F (37 °C)   TempSrc: Oral Oral Oral Oral   SpO2: 98%      Weight: 287 lb (130 kg)      Height: 66\" (167.6 cm)          Intake/Output Summary (Last 24 hours) at 06/08/17 0903  Last data filed at 06/08/17 0329   Gross per 24 hour   Intake             1320 ml   Output                0 ml   Net             1320 ml       GENERAL: Well-developed, well-nourished patient in no acute distress.  HEENT: Normocephalic, atraumatic, PERRLA. Moist mucous membranes.  NECK: No JVD present at 30°. No carotid bruits auscultated.  LUNGS: Clear to auscultation.  CARDIOVASCULAR: Heart has a regular rate and rhythm. No murmurs, gallops or rubs noted.   ABDOMEN: Soft, nontender. Positive bowel sounds.  MUSCULOSKELETAL: No gross deformities. No clubbing, cyanosis  EXT: pulses intact, no swelling  SKIN: Pink, warm  Neuro: Nonfocal exam. Gait intact    The patient's old records including ambulatory rhythm recordings (ECGs, Holter/event monitor) were reviewed and discussed.      Lab Review:     Results from last 7 days  Lab Units 06/08/17  0443 06/06/17  1553   SODIUM mmol/L 136 139   POTASSIUM mmol/L 4.2 3.9   CHLORIDE mmol/L 103 102   TOTAL CO2 mmol/L 26.0 27.0   BUN mg/dL 13 13   CREATININE mg/dL " 0.60 0.60   GLUCOSE mg/dL 118* 96   CALCIUM mg/dL 9.6 10.0                       Results from last 7 days  Lab Units 06/08/17  0443   MAGNESIUM mg/dL 1.9     EKG: SR QTc about 460-470 msec. 1st degree AV block. IVCD 118 msec.       Assessment & Plan:     1. Persistent afib s/p PVA w/ recurrence despite Flecainide 100mg BID and recent ECV   - Converted to SR after one dose. QTc looks good on EKG at 930 am.   - mag replacement.    - Continue Cardizem 360mg daily ; Continue Tikosyn at 500 mcg BID  - Pradaxa 150mg BID for CHADS-VASC of 2   - Plan for DC home tomorrow afternoon.           LOPEZ Fry  06/08/17  9:03 AM    I, Geronimo Rey, have reviewed the note in full and agree with all aspects of the above including physical exam, assessment, labs and plan with changes made accordingly. Face to Face Time was spent with the patient.    Geronimo Rey DO  06/08/17  3:29 PM        EMR Dragon/Transcription disclaimer:  Much of this encounter note is an electronic transcription/translation of spoken language to printed text. Electronic translation of spoken language may permit erroneous, or at times, nonsensical words or phrases to be inadvertently transcribed. Although I have reviewed the note for such errors, some may still exist.

## 2017-06-08 NOTE — PLAN OF CARE
Problem: Patient Care Overview (Adult)  Goal: Plan of Care Review  Outcome: Ongoing (interventions implemented as appropriate)    06/08/17 0502   Coping/Psychosocial Response Interventions   Plan Of Care Reviewed With patient   Outcome Evaluation   Outcome Summary/Follow up Plan sinus sinus lorraine 1st block occassional PVC and 3 beat run vtach noted on monitor at 0243 now sinus sinus arrhythmia room air no c/o pain or soa

## 2017-06-09 VITALS
SYSTOLIC BLOOD PRESSURE: 127 MMHG | WEIGHT: 287 LBS | OXYGEN SATURATION: 97 % | BODY MASS INDEX: 46.12 KG/M2 | HEART RATE: 57 BPM | HEIGHT: 66 IN | DIASTOLIC BLOOD PRESSURE: 62 MMHG | TEMPERATURE: 97.3 F | RESPIRATION RATE: 18 BRPM

## 2017-06-09 LAB
GLUCOSE BLDC GLUCOMTR-MCNC: 129 MG/DL (ref 70–130)
GLUCOSE BLDC GLUCOMTR-MCNC: 97 MG/DL (ref 70–130)
MAGNESIUM SERPL-MCNC: 2.2 MG/DL (ref 1.3–2.7)

## 2017-06-09 PROCEDURE — 93010 ELECTROCARDIOGRAM REPORT: CPT | Performed by: INTERNAL MEDICINE

## 2017-06-09 PROCEDURE — 93005 ELECTROCARDIOGRAM TRACING: CPT | Performed by: INTERNAL MEDICINE

## 2017-06-09 PROCEDURE — 83735 ASSAY OF MAGNESIUM: CPT | Performed by: INTERNAL MEDICINE

## 2017-06-09 PROCEDURE — 82962 GLUCOSE BLOOD TEST: CPT

## 2017-06-09 PROCEDURE — 93005 ELECTROCARDIOGRAM TRACING: CPT | Performed by: PHYSICIAN ASSISTANT

## 2017-06-09 PROCEDURE — 99232 SBSQ HOSP IP/OBS MODERATE 35: CPT | Performed by: INTERNAL MEDICINE

## 2017-06-09 RX ORDER — DILTIAZEM HYDROCHLORIDE 240 MG/1
240 CAPSULE, COATED, EXTENDED RELEASE ORAL DAILY
Status: DISCONTINUED | OUTPATIENT
Start: 2017-06-10 | End: 2017-06-09 | Stop reason: HOSPADM

## 2017-06-09 RX ORDER — DOFETILIDE 0.5 MG/1
500 CAPSULE ORAL EVERY 12 HOURS SCHEDULED
Qty: 60 CAPSULE | Refills: 5 | Status: SHIPPED | OUTPATIENT
Start: 2017-06-09 | End: 2017-12-14 | Stop reason: SDUPTHER

## 2017-06-09 RX ORDER — PANTOPRAZOLE SODIUM 40 MG/1
40 TABLET, DELAYED RELEASE ORAL DAILY
Qty: 30 TABLET | Refills: 5 | Status: SHIPPED | OUTPATIENT
Start: 2017-06-09 | End: 2017-12-05 | Stop reason: SDUPTHER

## 2017-06-09 RX ORDER — DILTIAZEM HYDROCHLORIDE 240 MG/1
240 CAPSULE, COATED, EXTENDED RELEASE ORAL DAILY
Qty: 30 CAPSULE | Refills: 5 | Status: SHIPPED | OUTPATIENT
Start: 2017-06-10 | End: 2017-12-04 | Stop reason: SDUPTHER

## 2017-06-09 RX ADMIN — PANTOPRAZOLE SODIUM 40 MG: 40 TABLET, DELAYED RELEASE ORAL at 06:29

## 2017-06-09 RX ADMIN — METFORMIN HYDROCHLORIDE 500 MG: 500 TABLET, FILM COATED ORAL at 08:39

## 2017-06-09 RX ADMIN — DABIGATRAN ETEXILATE MESYLATE 150 MG: 150 CAPSULE ORAL at 08:42

## 2017-06-09 RX ADMIN — LOSARTAN POTASSIUM 25 MG: 25 TABLET, FILM COATED ORAL at 08:39

## 2017-06-09 RX ADMIN — DOFETILIDE 500 MCG: 0.5 CAPSULE ORAL at 09:16

## 2017-06-09 RX ADMIN — DILTIAZEM HYDROCHLORIDE 360 MG: 180 CAPSULE, EXTENDED RELEASE ORAL at 08:39

## 2017-06-09 RX ADMIN — DOCUSATE SODIUM 200 MG: 100 CAPSULE, LIQUID FILLED ORAL at 08:39

## 2017-06-09 NOTE — PLAN OF CARE
Problem: Arrhythmia/Dysrhythmia (Symptomatic) (Adult)  Goal: Signs and Symptoms of Listed Potential Problems Will be Absent or Manageable (Arrhythmia/Dysrhythmia)  Outcome: Ongoing (interventions implemented as appropriate)    Problem: Patient Care Overview (Adult)  Goal: Plan of Care Review  Outcome: Ongoing (interventions implemented as appropriate)    06/09/17 0811   Coping/Psychosocial Response Interventions   Plan Of Care Reviewed With patient   Patient Care Overview   Progress progress toward functional goals as expected   Outcome Evaluation   Outcome Summary/Follow up Plan Remains in Sinus lorraine. VSS. Pt anticipating discharge to home later today.

## 2017-06-09 NOTE — PROGRESS NOTES
[facility] at [facility]  Cardiovascular Progress Note  Renetta Horner  [id number]  [id number]  1960    DATE OF ADMISSION: 6/7/2017  DATE OF FOLLOW UP:  6/9/17    Gsutabo Quach MD    Subjective:     Patient ID: Renetta Horner is a 56 y.o. female.    Chief Complaint: afib f/u     Allergies   Allergen Reactions   • Atenolol Shortness Of Breath     Hard to breath   • Indomethacin Shortness Of Breath     , HIVES    • Lisinopril Shortness Of Breath   • Penicillins Anaphylaxis   • Other Itching     [place]-- hives and vomiting   • Sulfa Antibiotics Nausea And Vomiting   • Ultram [Tramadol] Nausea And Vomiting   • Codeine Nausea Only     ITCHING        Current Facility-Administered Medications:   •  acetaminophen (TYLENOL) tablet 650 mg, 650 mg, Oral, Q6H PRN, LOPEZ Mejia, 650 mg at 06/08/17 2103  •  dabigatran etexilate (PRADAXA) capsule 150 mg, 150 mg, Oral, BID, LOPEZ Crowder, 150 mg at 06/08/17 1709  •  diltiaZEM CD (CARDIZEM CD) 24 hr capsule 360 mg, 360 mg, Oral, Daily, LOPEZ Crowder, 360 mg at 06/08/17 0905  •  docusate sodium (COLACE) capsule 200 mg, 200 mg, Oral, Daily, LOPEZ Crowder, 200 mg at 06/08/17 0905  •  dofetilide (TIKOSYN) capsule 500 mcg, 500 mcg, Oral, Q12H, LOPEZ Crowder, 500 mcg at 06/08/17 2103  •  furosemide (LASIX) tablet 20 mg, 20 mg, Oral, Daily PRN, LOPEZ Crowder  •  losartan (COZAAR) tablet 25 mg, 25 mg, Oral, Daily, LOPEZ Crowder, 25 mg at 06/08/17 0905  •  Magnesium Sulfate 2 gram Bolus, followed by 8 gram infusion (total Mg dose 10 grams)- Mg less than or equal to 1mg/dL, 2 g, Intravenous, PRN **OR** Magnesium Sulfate 6 gram Infusion (2 gm x 3) -Mg 1.1 -1.5 mg/dL, 2 g, Intravenous, PRN **OR** magnesium sulfate 4 gram infusion- Mg 1.6-1.9 mg/dL, 4 g, Intravenous, PRN, Geronimo Rey DO, Last Rate: 25 mL/hr at 06/08/17 0726, 4 g at 06/08/17 0726  •  metFORMIN (GLUCOPHAGE) tablet 500 mg, 500 mg, Oral, BID With Meals, Susan  LOPEZ Garza, 500 mg at 06/08/17 1709  •  pantoprazole (PROTONIX) EC tablet 40 mg, 40 mg, Oral, Q AM, Geronimo Rey DO, 40 mg at 06/09/17 0629  •  Pharmacy Consult, , Does not apply, Once PRN, LOPEZ Crowder  •  Pharmacy Meds to Bed Consult, , Does not apply, Daily, James Womack [facility]  •  potassium chloride (MICRO-K) CR capsule 40 mEq, 40 mEq, Oral, PRN **OR** potassium chloride (KLOR-CON) packet 40 mEq, 40 mEq, Oral, PRN, LOPEZ Crowder    History of Present Illness  Patient doing ok today. No major compliants.       ROS   14 point ROS negative except as outlined in problem list, HPI and other parts of the note.    Procedures       Objective:       Vitals:    06/09/17 0245 06/09/17 0300 06/09/17 0349 06/09/17 0711   BP:   135/77 146/68   BP Location:    Left arm   Patient Position:    Sitting   Pulse: 50 54 54 65   Resp:   20 20   Temp:   98 °F (36.7 °C) 97.8 °F (36.6 °C)   TempSrc:   Oral Oral   SpO2:       Weight:       Height:           Intake/Output Summary (Last 24 hours) at 06/09/17 0835  Last data filed at 06/09/17 0030   Gross per 24 hour   Intake              840 ml   Output              240 ml   Net              600 ml       GENERAL: Well-developed, well-nourished patient in no acute distress.  HEENT: Normocephalic, atraumatic, PERRLA. Moist mucous membranes.  NECK: No JVD present at 30°. No carotid bruits auscultated.  LUNGS: Clear to auscultation.  CARDIOVASCULAR: Heart has a regular rate and rhythm. No murmurs, gallops or rubs noted.   ABDOMEN: Soft, nontender. Positive bowel sounds.  MUSCULOSKELETAL: No gross deformities. No clubbing, cyanosis  EXT: pulses intact, no swelling  SKIN: Pink, warm  Neuro: Nonfocal exam. Gait intact    The patient's old records including ambulatory rhythm recordings (ECGs, Holter/event monitor) were reviewed and discussed.      Lab Review:     Results from last 7 days  Lab Units 06/08/17  0443 06/06/17  1553   SODIUM mmol/L 136 139   POTASSIUM mmol/L 4.2 3.9    CHLORIDE mmol/L 103 102   TOTAL CO2 mmol/L 26.0 27.0   BUN mg/dL 13 13   CREATININE mg/dL 0.60 0.60   GLUCOSE mg/dL 118* 96   CALCIUM mg/dL 9.6 10.0                       Results from last 7 days  Lab Units 06/09/17  0459   MAGNESIUM mg/dL 2.2           EKG SB with rate 58 bpm. First degree AV block and  msec  QTc about 460-470 msec.         Assessment & Plan:     1. Persistent afib s/p PVA w/ recurrence despite Flecainide 100mg BID and recent ECV   - Converted to SR after one dose. QTc ok on Tikosyn.  -  mag replacement.   - Continue Cardizem 360mg daily ; Continue Tikosyn at 500 mcg BID. Decrease Cardizem to 240 mg daily.   - Pradaxa 150mg BID for CHADS-VASC of 2   - Plan for DC home this afternoon after EKG at noon     LOPEZ Fry  06/09/17  8:35 AM    I, Geronimo Rey, have reviewed the note in full and agree with all aspects of the above including physical exam, assessment, labs and plan with changes made accordingly. Face to Face Time was spent with the patient.    Geronimo Rey DO  06/09/17  8:50 AM

## 2017-06-09 NOTE — PLAN OF CARE
Problem: Patient Care Overview (Adult)  Goal: Plan of Care Review  Outcome: Ongoing (interventions implemented as appropriate)    06/09/17 6377   Coping/Psychosocial Response Interventions   Plan Of Care Reviewed With patient;spouse   Patient Care Overview   Progress progress toward functional goals as expected   Outcome Evaluation   Outcome Summary/Follow up Plan remains in sinus lorraine with rates 44-57 1st degree block please note QTc this am 490-500 remains on room air

## 2017-06-09 NOTE — PROGRESS NOTES
Continued Stay Note  [facility]     Patient Name: Renetta Horner  MRN: [id number]  Today's Date: 6/9/2017    Admit Date: 6/7/2017          Discharge Plan       06/09/17 0900    Case Management/Social Work Plan    Plan Home    Patient/Family In Agreement With Plan yes    Additional Comments Spoke with [facility] and PA was completed for $0 copay. Discussed with pt and she is agreeable.               Discharge Codes     None        Expected Discharge Date and Time     Expected Discharge Date Expected Discharge Time    Shane 10, 2017             Susan Ramos

## 2017-06-30 ENCOUNTER — OFFICE VISIT (OUTPATIENT)
Dept: CARDIOLOGY | Facility: CLINIC | Age: 57
End: 2017-06-30

## 2017-06-30 VITALS
BODY MASS INDEX: 45.8 KG/M2 | HEIGHT: 66 IN | DIASTOLIC BLOOD PRESSURE: 84 MMHG | HEART RATE: 69 BPM | SYSTOLIC BLOOD PRESSURE: 150 MMHG | WEIGHT: 285 LBS

## 2017-06-30 DIAGNOSIS — I48.19 PERSISTENT ATRIAL FIBRILLATION (HCC): Primary | ICD-10-CM

## 2017-06-30 DIAGNOSIS — I10 ESSENTIAL HYPERTENSION: ICD-10-CM

## 2017-06-30 PROCEDURE — 99213 OFFICE O/P EST LOW 20 MIN: CPT | Performed by: PHYSICIAN ASSISTANT

## 2017-06-30 NOTE — PROGRESS NOTES
Subjective:   Renetta Horner  1960  [phone]      06/30/2017    [facility] CARDIOLOGY    Gustabo Quach MD  [address]  [place] [phone]    REFERRING DOCTOR: Mt Seals MD       Patient ID: Renetta Horner is a 56 y.o. female.    Chief Complaint:   Chief Complaint   Patient presents with   • Follow-up     Problem List:    1. Persistent Atrial Fibrillation s/p ECV X2 with IRAF and failed Flecainide  A. Diagnosed in 2015. CHADS-VASc = 2   B. No a/c secondary to hemarthrosis on Xarelto.   C. Echo show EF of 65%. Stress test negative for ischemia.   D. Restarted on Pradaxa and no major issues 150 mg BID  E. Pulmonary vein ablation/roof line/box in lesion along the posterior wall on 01/18/2017.     Allergies   Allergen Reactions   • Atenolol Shortness Of Breath     Hard to breath   • Indomethacin Shortness Of Breath     , HIVES    • Lisinopril Shortness Of Breath   • Penicillins Anaphylaxis   • Other Itching     [place]-- hives and vomiting   • Sulfa Antibiotics Nausea And Vomiting   • Ultram [Tramadol] Nausea And Vomiting   • Xarelto [Rivaroxaban]      Bleeding knee   • Codeine Nausea Only     ITCHING        Current Outpatient Prescriptions:   •  dabigatran etexilate (PRADAXA) 150 MG capsu, Take 1 capsule by mouth 2 (Two) Times a Day., Disp: 60 capsule, Rfl: 7  •  diltiaZEM CD (CARDIZEM CD) 240 MG 24 hr capsule, Take 1 capsule by mouth Daily., Disp: 30 capsule, Rfl: 5  •  docusate sodium (COLACE) 100 MG capsule, Take 200 mg by mouth Daily., Disp: , Rfl:   •  dofetilide (TIKOSYN) 500 MCG capsule, Take 1 capsule by mouth Every 12 (Twelve) Hours., Disp: 60 capsule, Rfl: 5  •  furosemide (LASIX) 20 MG tablet, Take 1 tablet by mouth daily as needed (edema)., Disp: 20 tablet, Rfl: 3  •  losartan (COZAAR) 25 MG tablet, Take 1 tablet by mouth Daily., Disp: 30 tablet, Rfl: 6  •  metFORMIN (GLUCOPHAGE) 500 MG tablet, Take 500 mg by mouth 2 (Two) Times a Day With  "Meals., Disp: , Rfl:   •  pantoprazole (PROTONIX) 40 MG EC tablet, Take 1 tablet by mouth Daily., Disp: 30 tablet, Rfl: 5    History of Present Illness    Patient presents today for further follow-up and management of her Persistent afib s/p PVA and recent Tikosyn loading. Has been maintaining sinus. Doing well from cardiac standpoint.     No issues with chest pain, shortness of breath, fevers, chills, night sweats, PND, orthopnea or palpitations. No recent ER visits or hospital stays.      The following portions of the patient's history were reviewed and updated as appropriate: allergies, current medications, past family history, past medical history, past social history, past surgical history and problem list.    ROS   14 point ROS negative except as outlined in problem list, HPI and other parts of the note.    Procedures       Objective:       Vitals:    06/30/17 1047   BP: 150/84   Pulse: 69   Weight: 285 lb (129 kg)   Height: 66\" (167.6 cm)       GENERAL: Well-developed, well-nourished patient in no acute distress.  HEENT: Normocephalic, atraumatic, PERRLA. Moist mucous membranes.  NECK: No JVD present at 30°. No carotid bruits auscultated.  LUNGS: Clear to auscultation.  CARDIOVASCULAR: Heart has a regular rate and rhythm. No murmurs, gallops or rubs noted.   ABDOMEN: Soft, nontender. Positive bowel sounds.  MUSCULOSKELETAL: No gross deformities. No clubbing, cyanosis, or lower extremity edema.  SKIN: Pink, warm  Neuro: Nonfocal exam. Gait intact  Ext: No edema or bruising    The patient's old records including ambulatory rhythm recordings (ECGs, Holter/event monitor) were reviewed and discussed.      Lab Review:           Diagnosis:   1. Persistent atrial fibrillation    2. Essential hypertension    Assessment & Plan:     1. Persistent afib s/p PVA with recurrence. Maintaining SR on Tikosyn 500 mcg BID with acceptable QTc. Continue Tikosyn, Cardizem. Continue Pradaxa 150mg BID for a/c.     2. HTN, continue " current regimen. SBP usually 130. If remains elevated, can increase Cozaar.     3. Follow-up in 6 months.        LOPEZ Fry  06/30/17  11:13 AM

## 2017-08-14 RX ORDER — LOSARTAN POTASSIUM 25 MG/1
TABLET ORAL
Qty: 30 TABLET | Refills: 6 | Status: SHIPPED | OUTPATIENT
Start: 2017-08-14 | End: 2017-09-07 | Stop reason: SDUPTHER

## 2017-09-07 ENCOUNTER — OFFICE VISIT (OUTPATIENT)
Dept: CARDIOLOGY | Facility: CLINIC | Age: 57
End: 2017-09-07

## 2017-09-07 VITALS
HEIGHT: 66 IN | OXYGEN SATURATION: 99 % | DIASTOLIC BLOOD PRESSURE: 74 MMHG | WEIGHT: 290.8 LBS | SYSTOLIC BLOOD PRESSURE: 169 MMHG | HEART RATE: 78 BPM | BODY MASS INDEX: 46.73 KG/M2

## 2017-09-07 DIAGNOSIS — R60.9 PERIPHERAL EDEMA: ICD-10-CM

## 2017-09-07 DIAGNOSIS — I48.0 PAROXYSMAL ATRIAL FIBRILLATION (HCC): Primary | ICD-10-CM

## 2017-09-07 DIAGNOSIS — I10 ESSENTIAL HYPERTENSION: ICD-10-CM

## 2017-09-07 PROCEDURE — 93000 ELECTROCARDIOGRAM COMPLETE: CPT | Performed by: NURSE PRACTITIONER

## 2017-09-07 PROCEDURE — 99214 OFFICE O/P EST MOD 30 MIN: CPT | Performed by: NURSE PRACTITIONER

## 2017-09-07 RX ORDER — CLONIDINE HYDROCHLORIDE 0.1 MG/1
0.1 TABLET ORAL 3 TIMES DAILY PRN
Qty: 30 TABLET | Refills: 3 | Status: SHIPPED | OUTPATIENT
Start: 2017-09-07 | End: 2017-12-10 | Stop reason: SDUPTHER

## 2017-09-07 RX ORDER — LOSARTAN POTASSIUM 25 MG/1
25 TABLET ORAL EVERY MORNING
Qty: 30 TABLET | Refills: 6
Start: 2017-09-07 | End: 2017-09-11 | Stop reason: DRUGHIGH

## 2017-09-07 RX ORDER — CLONIDINE HYDROCHLORIDE 0.1 MG/1
0.1 TABLET ORAL ONCE
Status: COMPLETED | OUTPATIENT
Start: 2017-09-07 | End: 2017-09-07

## 2017-09-07 RX ORDER — LOSARTAN POTASSIUM 25 MG/1
TABLET ORAL
Qty: 30 TABLET | Refills: 5 | Status: SHIPPED | OUTPATIENT
Start: 2017-09-07 | End: 2017-09-11 | Stop reason: DRUGHIGH

## 2017-09-07 RX ADMIN — CLONIDINE HYDROCHLORIDE 0.1 MG: 0.1 TABLET ORAL at 14:01

## 2017-09-07 NOTE — PROGRESS NOTES
Subjective   Renetta Horner is a 56 y.o. female     Chief Complaint   Patient presents with   • Hypertension     presents as a follow up   • Atrial Fibrillation       HPI    Problem List:    1. Atrial Fibrillation   1.1 CHADS score 2; patient Pradaxa and Tikosyn  1.2 Event Monitor 4/23-5/3/16 - Afib and Afib with RVR  1.3 Ablation with Dr. Rey 1/18/17  1.4 Cardioversion with Dr. Rey 4/28/17  2. Hypertension  2.1 Echo 4/8/15 - EF > 65%; DD II; trace MR  3. Chest Pain   3.1 Stress Test 4/8/15 - no ischemia   4. Diabetes Mellitus II    Patient is a 56-year-old female who presents today for follow-up with her  at her side.  He denies any chest pain, pressure, palpitations, fluttering, dizziness, presyncope, syncope, orthopnea or PND.  She says she does get a little bit of swelling in her legs at times and will use her water pill.  Patient's blood pressure is elevated today.  We will give her clonidine.  She says it was slightly elevated the last time she saw Maya and he said that they may have to go up on her losartan.  She says she was previously on 25 twice a day and did not tolerate that well.  She denies any signs of bleeding or cerebral ischemic events.  He has been having headaches last couple weeks but hasn't really paid attention to her blood pressure.    Current Outpatient Prescriptions   Medication Sig Dispense Refill   • dabigatran etexilate (PRADAXA) 150 MG capsu Take 1 capsule by mouth 2 (Two) Times a Day. 60 capsule 7   • diltiaZEM CD (CARDIZEM CD) 240 MG 24 hr capsule Take 1 capsule by mouth Daily. 30 capsule 5   • docusate sodium (COLACE) 100 MG capsule Take 200 mg by mouth Daily.     • dofetilide (TIKOSYN) 500 MCG capsule Take 1 capsule by mouth Every 12 (Twelve) Hours. 60 capsule 5   • furosemide (LASIX) 20 MG tablet Take 1 tablet by mouth daily as needed (edema). 20 tablet 3   • losartan (COZAAR) 25 MG tablet Take 1 tablet by mouth Every Morning. 30 tablet 6   • metFORMIN (GLUCOPHAGE)  500 MG tablet Take 500 mg by mouth 2 (Two) Times a Day With Meals.     • pantoprazole (PROTONIX) 40 MG EC tablet Take 1 tablet by mouth Daily. 30 tablet 5   • CloNIDine (CATAPRES) 0.1 MG tablet Take 1 tablet by mouth 3 (Three) Times a Day As Needed for High Blood Pressure (SBP > 160 OR DBP > 90). 30 tablet 3   • losartan (COZAAR) 25 MG tablet Take 1/2 tablet in afternoon 30 tablet 5     Current Facility-Administered Medications   Medication Dose Route Frequency Provider Last Rate Last Dose   • CloNIDine (CATAPRES) tablet 0.1 mg  0.1 mg Oral Once SUSANA Kumar           ALLERGIES    Atenolol; Indomethacin; Lisinopril; Penicillins; Other; Sulfa antibiotics; Ultram [tramadol]; Xarelto [rivaroxaban]; and Codeine    Past Medical History:   Diagnosis Date   • Arthritis    • Atrial fibrillation     CHADS-VASC = 3   • Atrial fibrillation 5/9/2016    1. Persistent Atrial Fibrillation s/p ECV X2 with IRAF and failed Flecainide     A. Diagnosed in 2015. CHADS-VASc = 3 (DM, HTN, female)      B. Xarelto & ASA stopped due to hemarthrosis of knee.        C. Restarted on Pradaxa and no major issues 150 mg BID     D. Echo 4/8/15 showed EF 65%, moderate, Grade II diastolic dysfunction, mild SERA (LA 3.9cm), normal valves     E. Myocardial Perfusion study 4/8/15: negative for ischemia, EF 68%     • Chest pain    • Diabetes mellitus     on oral agents, Type 2 , Patient states last a1c was 5.9 --DX'D 5 YEARS AGO, CHECKS BLOOD SUGAR DAILY AVERAGE 115-122   • Dizziness    • Fatigue    • GERD (gastroesophageal reflux disease)    • Headache    • Heart murmur    • Hypertension     CONTROLLED WITH MEDS PER PT    • Kidney stone    • Palpitations    • PONV (postoperative nausea and vomiting)    • Wears dentures     UPPER PLATE    • Wears dentures     upper   • Wears eyeglasses     reading       Social History     Social History   • Marital status:      Spouse name: N/A   • Number of children: N/A   • Years of education: N/A  "    Occupational History   • Not on file.     Social History Main Topics   • Smoking status: Former Smoker     Types: Cigarettes   • Smokeless tobacco: Never Used      Comment: QUIT 1997   • Alcohol use No   • Drug use: No   • Sexual activity: Defer     Other Topics Concern   • Not on file     Social History Narrative       Family History   Problem Relation Age of Onset   • Coronary artery disease Mother    • Breast cancer Mother    • Diabetes Mother    • Other Mother      Benign prostatic hypertrophy       Review of Systems   Constitutional: Positive for fatigue. Negative for diaphoresis.   HENT: Negative for rhinorrhea and sneezing.    Eyes: Positive for visual disturbance (reading glasses ).   Respiratory: Negative for chest tightness and shortness of breath.    Cardiovascular: Positive for leg swelling (little bit ). Negative for chest pain and palpitations.   Gastrointestinal: Positive for constipation. Negative for nausea and vomiting.   Endocrine: Negative.    Genitourinary: Negative for difficulty urinating.   Musculoskeletal: Positive for arthralgias. Negative for back pain and neck pain.   Skin: Negative.    Allergic/Immunologic: Negative for environmental allergies.   Neurological: Positive for headaches. Negative for dizziness, syncope and light-headedness.   Hematological: Negative.    Psychiatric/Behavioral: Negative.        Objective   BP (!) 182/83 (BP Location: Left arm, Patient Position: Sitting)  Pulse 78  Ht 66\" (167.6 cm)  Wt 290 lb 12.8 oz (132 kg)  LMP  (LMP Unknown)  SpO2 99%  BMI 46.94 kg/m2  Lab Results (most recent)     None        Physical Exam   Constitutional: She is oriented to person, place, and time. She appears well-developed and well-nourished. She is active and cooperative.   HENT:   Head: Normocephalic.   Eyes: Lids are normal.   Neck: Normal carotid pulses, no hepatojugular reflux and no JVD present. Carotid bruit is not present.   Cardiovascular: Regular rhythm and normal " heart sounds.  Bradycardia present.    Pulses:       Radial pulses are 2+ on the right side, and 2+ on the left side.        Dorsalis pedis pulses are 2+ on the right side, and 2+ on the left side.        Posterior tibial pulses are 2+ on the right side, and 2+ on the left side.   No edema BLE.   Pulmonary/Chest: Effort normal and breath sounds normal.   Abdominal: Normal appearance and bowel sounds are normal.   Neurological: She is alert and oriented to person, place, and time.   Skin: Skin is warm, dry and intact.   Psychiatric: She has a normal mood and affect. Her speech is normal and behavior is normal. Judgment and thought content normal. Cognition and memory are normal.       Procedure     ECG 12 Lead  Date/Time: 9/7/2017 1:18 PM  Performed by: BANDAR MONTERO  Authorized by: BANDAR MONTERO   Comparison: compared with previous ECG from 6/30/2017  Similar to previous ECG  Rhythm: sinus bradycardia  Rate: bradycardic  BPM: 59  QRS axis: normal  Clinical impression: non-specific ECG and low voltage  Comments: A fib  QT/QTc 460/458                 Assessment/Plan      Diagnosis Plan   1. Paroxysmal atrial fibrillation  ECG 12 Lead   2. Essential hypertension  losartan (COZAAR) 25 MG tablet    CloNIDine (CATAPRES) tablet 0.1 mg    CloNIDine (CATAPRES) 0.1 MG tablet   3. Peripheral edema         Return in about 6 weeks (around 10/19/2017).       A. fib-patient is on Pradaxa and Tikosyn.  Hypertension-patient's blood pressures elevated today we will give her clonidine.  She will take losartan 20 5 in the morning and a half a tablet around lunchtime.  She will use clonidine when necessary for SBP greater than 160 or DBP greater than 90.  She will monitor her blood pressure and heart rate.  She will follow-up in 6 weeks or sooner with any changes.  She will also bring the blood pressure and heart rate readings to her follow-up appointment.  Peripheral edema-patient uses her Lasix when necessary.

## 2017-09-07 NOTE — PATIENT INSTRUCTIONS

## 2017-09-11 ENCOUNTER — TELEPHONE (OUTPATIENT)
Dept: CARDIOLOGY | Facility: CLINIC | Age: 57
End: 2017-09-11

## 2017-09-11 DIAGNOSIS — I10 ESSENTIAL HYPERTENSION: Primary | ICD-10-CM

## 2017-09-11 RX ORDER — LOSARTAN POTASSIUM 25 MG/1
TABLET ORAL
Qty: 30 TABLET | Refills: 5 | Status: SHIPPED | OUTPATIENT
Start: 2017-09-11 | End: 2017-09-11 | Stop reason: SDUPTHER

## 2017-09-11 RX ORDER — LOSARTAN POTASSIUM 25 MG/1
25 TABLET ORAL 2 TIMES DAILY
Qty: 60 TABLET | Refills: 5 | Status: SHIPPED | OUTPATIENT
Start: 2017-09-11 | End: 2017-10-19

## 2017-09-11 NOTE — TELEPHONE ENCOUNTER
PATIENT'S PHARMACY WANTED VERIFICATION ON HER LOSARTAN. SHE TAKE LOSARTAN 25MG 1 TAB IN AM AND 1/2 TAB AT LUNCHTIME

## 2017-09-11 NOTE — TELEPHONE ENCOUNTER
----- Message from Chanda Madrid sent at 9/11/2017  8:18 AM EDT -----  Pt needs new rx of her Losartan sent to [facility] [place]. Her last office visit, adjustments were made to her dosage and her pharmacy will not fill a new script.

## 2017-09-11 NOTE — TELEPHONE ENCOUNTER
----- Message from Diya Alves sent at 9/11/2017  3:17 PM EDT -----  Contact: JESSY WITH [facility]   THE PHARMACY CALLED WANTING TO VERIFY INSTRUCTIONS ON THE PATIENTS LISARTIN 25MG.  THEY CAN BE REACHED [phone].  THANKS

## 2017-09-22 ENCOUNTER — TELEPHONE (OUTPATIENT)
Dept: CARDIOLOGY | Facility: CLINIC | Age: 57
End: 2017-09-22

## 2017-09-22 DIAGNOSIS — I10 ESSENTIAL HYPERTENSION: Primary | ICD-10-CM

## 2017-09-22 NOTE — TELEPHONE ENCOUNTER
----- Message from SUSANA Kumar sent at 9/22/2017  9:35 AM EDT -----  LOSARTAN 50 MG DAILY bmp 1 WEEK   ----- Message -----     From: Brittany Hdz MA     Sent: 9/22/2017   9:27 AM       To: SUSANA Kumar    She take 25mg daily, then 1/2 tablet 2 hours later    ----- Message -----     From: SUSANA Kumar     Sent: 9/22/2017   9:22 AM       To: Brittany Hdz MA    Is she taking her losartan 25 daily or BID?  ----- Message -----     From: Brittany Hdz MA     Sent: 9/22/2017   9:03 AM       To: SUSANA Kumar    Patient's blood pressure has been elevated since being in office. She states she takes 25mg at 8:30, waits 2 hours and takes a 12.5mg. She also takes dilitazem 240mg morning and still is having to take clonidine. She said that her blood pressure is still running 160s/high 80 even after taking the medications. This morning her blood pressure is 171/85 but she has taken no medications until she talks to us. She said even with the medicines that her blood pressure isn't far from the 171/85  ----- Message -----     From: Chanda Madrid     Sent: 9/22/2017   8:06 AM       To: Yvan Graham [facility]     Pt calls to report that her bp has been fluctuating.

## 2017-09-27 ENCOUNTER — TELEPHONE (OUTPATIENT)
Dept: CARDIOLOGY | Facility: CLINIC | Age: 57
End: 2017-09-27

## 2017-09-27 NOTE — TELEPHONE ENCOUNTER
I informed the patient's  to have the patient take losartan 50mg in the morning and 25 in the evening. She is to take the clonidine as she needs it for her blood pressure. I advised the patient to wait on the blood work until after she gets back into town.

## 2017-09-27 NOTE — TELEPHONE ENCOUNTER
----- Message from SUSANA Kumar sent at 9/27/2017 10:32 AM EDT -----  Contact: SEPIDEH  Have her take 50 losartan in AM and 25 losartan PM.  Still needs BMP in 1 weeks.  Call back 3-5 days with results.   ----- Message -----     From: Brittany Hdz MA     Sent: 9/27/2017   9:56 AM       To: SUSANA Kumar    Sepideh's  gave her bp readings.   9/22 7am 166/77;            2:01pm 142/77;            9:14 pm 160/75  9/23 7 am 150/76;          405 pm 147/66;          10pm 158/66  9/24 8 am 163/77;        12 pm 163/75 (took clonidine at 12);        2pm 146/69;        6:11 147/71;       9:30 141/71  925 7 am 156/79;        1130am 170/64 (took clonidine at 11:30);         4:42 147/79  9/26 7 a, 161/84;          1pm 136/59;        6pm 169/79( took clonidine at 6);   9/27 7am 159/84    Patient is taking losartan 50mg daily. Has taken clonidine 3 times this week. I am faxing lab order to [facility] for the patient.      ----- Message -----     From: Stephon Holland     Sent: 9/27/2017   8:10 AM       To: antonia Jimenez [facility]    PATIENT CALLED ASKING IF HER LAB ORDER WAS MAILED OUT Friday BY A NURSE  AND SHE IN NEEDING TO GIVE B/P READINGS. REQUEST TO BE CALLED.

## 2017-10-19 ENCOUNTER — OFFICE VISIT (OUTPATIENT)
Dept: CARDIOLOGY | Facility: CLINIC | Age: 57
End: 2017-10-19

## 2017-10-19 VITALS
DIASTOLIC BLOOD PRESSURE: 77 MMHG | HEART RATE: 64 BPM | BODY MASS INDEX: 46.8 KG/M2 | WEIGHT: 291.2 LBS | HEIGHT: 66 IN | SYSTOLIC BLOOD PRESSURE: 171 MMHG | OXYGEN SATURATION: 96 %

## 2017-10-19 DIAGNOSIS — I48.19 PERSISTENT ATRIAL FIBRILLATION (HCC): Primary | ICD-10-CM

## 2017-10-19 DIAGNOSIS — I10 ESSENTIAL HYPERTENSION: ICD-10-CM

## 2017-10-19 PROCEDURE — 99213 OFFICE O/P EST LOW 20 MIN: CPT | Performed by: NURSE PRACTITIONER

## 2017-10-19 RX ORDER — IRBESARTAN 300 MG/1
300 TABLET ORAL NIGHTLY
Qty: 30 TABLET | Refills: 5 | Status: SHIPPED | OUTPATIENT
Start: 2017-10-19 | End: 2018-04-13 | Stop reason: SDUPTHER

## 2017-10-19 RX ORDER — UBIDECARENONE 100 MG
50 CAPSULE ORAL DAILY
COMMUNITY
End: 2018-04-19 | Stop reason: ALTCHOICE

## 2017-10-19 NOTE — PATIENT INSTRUCTIONS

## 2017-10-19 NOTE — PROGRESS NOTES
Subjective   Renetta Horner is a 57 y.o. female     Chief Complaint   Patient presents with   • Hypertension     presents as a follow up       HPI    Problem List:    1. Atrial Fibrillation   1.1 CHADS score 2; patient Pradaxa and Tikosyn  1.2 Event Monitor 4/23-5/3/16 - Afib and Afib with RVR  1.3 Ablation with Dr. Rey 1/18/17  1.4 Cardioversion with Dr. Rey 4/28/17  2. Hypertension  2.1 Echo 4/8/15 - EF > 65%; DD II; trace MR  3. Chest Pain   3.1 Stress Test 4/8/15 - no ischemia   4. Diabetes Mellitus II    Patient is a 57-year-old female who presents today for follow-up with her  at her side.  She denies any chest pain, pressure, palpitations, fluttering, dizziness, presyncope, syncope, orthopnea, PND or edema.  She denies any shortness of breath with activity.  She says her blood pressure still has not been very well controlled and her PCP also increase the losartan to that she's taken 50 twice a day.  She says she is on Micardis before to do very well but her [employer] would not pay for.  Her blood pressures only been high since she has been on the Tikosyn.  PCP monitors her cholesterol.  She denies any signs of bleeding or cerebral ischemic events.      We went over labs.    Current Outpatient Prescriptions   Medication Sig Dispense Refill   • CloNIDine (CATAPRES) 0.1 MG tablet Take 1 tablet by mouth 3 (Three) Times a Day As Needed for High Blood Pressure (SBP > 160 OR DBP > 90). 30 tablet 3   • coenzyme Q10 100 MG capsule Take 50 mg by mouth Daily.     • dabigatran etexilate (PRADAXA) 150 MG capsu Take 1 capsule by mouth 2 (Two) Times a Day. 60 capsule 7   • diltiaZEM CD (CARDIZEM CD) 240 MG 24 hr capsule Take 1 capsule by mouth Daily. 30 capsule 5   • docusate sodium (COLACE) 100 MG capsule Take 200 mg by mouth Daily.     • dofetilide (TIKOSYN) 500 MCG capsule Take 1 capsule by mouth Every 12 (Twelve) Hours. 60 capsule 5   • furosemide (LASIX) 20 MG tablet Take 1 tablet by mouth daily as  needed (edema). 20 tablet 3   • metFORMIN (GLUCOPHAGE) 500 MG tablet Take 500 mg by mouth 2 (Two) Times a Day With Meals.     • pantoprazole (PROTONIX) 40 MG EC tablet Take 1 tablet by mouth Daily. 30 tablet 5   • irbesartan (AVAPRO) 300 MG tablet Take 1 tablet by mouth Every Night. 30 tablet 5     No current facility-administered medications for this visit.        ALLERGIES    Atenolol; Indomethacin; Lisinopril; Penicillins; Other; Sulfa antibiotics; Ultram [tramadol]; Xarelto [rivaroxaban]; and Codeine    Past Medical History:   Diagnosis Date   • Arthritis    • Atrial fibrillation     CHADS-VASC = 3   • Atrial fibrillation 5/9/2016    1. Persistent Atrial Fibrillation s/p ECV X2 with IRAF and failed Flecainide     A. Diagnosed in 2015. CHADS-VASc = 3 (DM, HTN, female)      B. Xarelto & ASA stopped due to hemarthrosis of knee.        C. Restarted on Pradaxa and no major issues 150 mg BID     D. Echo 4/8/15 showed EF 65%, moderate, Grade II diastolic dysfunction, mild SERA (LA 3.9cm), normal valves     E. Myocardial Perfusion study 4/8/15: negative for ischemia, EF 68%     • Chest pain    • Diabetes mellitus     on oral agents, Type 2 , Patient states last a1c was 5.9 --DX'D 5 YEARS AGO, CHECKS BLOOD SUGAR DAILY AVERAGE 115-122   • Dizziness    • Fatigue    • GERD (gastroesophageal reflux disease)    • Headache    • Heart murmur    • Hypertension     CONTROLLED WITH MEDS PER PT    • Kidney stone    • Palpitations    • PONV (postoperative nausea and vomiting)    • Wears dentures     UPPER PLATE    • Wears dentures     upper   • Wears eyeglasses     reading       Social History     Social History   • Marital status:      Spouse name: N/A   • Number of children: N/A   • Years of education: N/A     Occupational History   • Not on file.     Social History Main Topics   • Smoking status: Former Smoker     Types: Cigarettes   • Smokeless tobacco: Never Used      Comment: QUIT 1997   • Alcohol use No   • Drug use: No  "  • Sexual activity: Defer     Other Topics Concern   • Not on file     Social History Narrative       Family History   Problem Relation Age of Onset   • Coronary artery disease Mother    • Breast cancer Mother    • Diabetes Mother    • Other Mother      Benign prostatic hypertrophy       Review of Systems   Constitutional: Positive for fatigue. Negative for diaphoresis.   HENT: Negative for rhinorrhea and sneezing.    Eyes: Negative for visual disturbance.   Respiratory: Negative for chest tightness and shortness of breath.    Cardiovascular: Negative for chest pain, palpitations and leg swelling.   Gastrointestinal: Positive for constipation. Negative for nausea and vomiting.   Endocrine: Negative.    Genitourinary: Negative for difficulty urinating.   Musculoskeletal: Positive for arthralgias. Negative for back pain and neck pain.   Skin: Negative.    Allergic/Immunologic: Negative for environmental allergies.   Neurological: Positive for headaches (with BP ). Negative for dizziness, syncope and light-headedness.   Hematological: Negative.    Psychiatric/Behavioral: Negative.        Objective   /77 (BP Location: Left arm, Patient Position: Sitting)  Pulse 64  Ht 66\" (167.6 cm)  Wt 291 lb 3.2 oz (132 kg)  LMP  (LMP Unknown)  SpO2 96%  BMI 47 kg/m2  Vitals:    10/19/17 1314   BP: 171/77   BP Location: Left arm   Patient Position: Sitting   Pulse: 64   SpO2: 96%   Weight: 291 lb 3.2 oz (132 kg)   Height: 66\" (167.6 cm)      Lab Results (most recent)     None        Physical Exam   Constitutional: She is oriented to person, place, and time. Vital signs are normal. She appears well-developed and well-nourished. She is active and cooperative.   HENT:   Head: Normocephalic.   Eyes: Lids are normal.   Neck: Normal carotid pulses, no hepatojugular reflux and no JVD present. Carotid bruit is not present.   Cardiovascular: Normal rate, regular rhythm and normal heart sounds.    Pulses:       Radial pulses are 2+ " on the right side, and 2+ on the left side.        Dorsalis pedis pulses are 2+ on the right side, and 2+ on the left side.        Posterior tibial pulses are 2+ on the right side, and 2+ on the left side.   No edema BLE.    Pulmonary/Chest: Effort normal and breath sounds normal.   Abdominal: Normal appearance and bowel sounds are normal.   Neurological: She is alert and oriented to person, place, and time.   Skin: Skin is warm, dry and intact.   Psychiatric: She has a normal mood and affect. Her speech is normal and behavior is normal. Judgment and thought content normal. Cognition and memory are normal.       Procedure   Procedures         Assessment/Plan      Diagnosis Plan   1. Persistent atrial fibrillation     2. Essential hypertension  irbesartan (AVAPRO) 300 MG tablet    Duplex Renal Artery - Bilateral Complete CAR    Duplex Renal Artery - Bilateral Complete CAR       Return in about 8 weeks (around 12/14/2017).    A. fib-patient is on Tikosyn and Pradaxa.  Hypertension-patient will stop losartan.  She will start off irbesartan 300 once a day.  She will have bilateral renal arterial ultrasound.  She will continue her medication regimen.  She will monitor blood pressure and heart rate.  She will follow-up in 8 weeks or sooner if any changes.

## 2017-10-25 DIAGNOSIS — I48.0 PAROXYSMAL ATRIAL FIBRILLATION (HCC): ICD-10-CM

## 2017-10-25 RX ORDER — ATENOLOL 100 MG/1
TABLET ORAL
Qty: 60 CAPSULE | Refills: 7 | Status: SHIPPED | OUTPATIENT
Start: 2017-10-25 | End: 2017-12-04 | Stop reason: SDUPTHER

## 2017-10-27 ENCOUNTER — TELEPHONE (OUTPATIENT)
Dept: CARDIOLOGY | Facility: CLINIC | Age: 57
End: 2017-10-27

## 2017-10-27 NOTE — TELEPHONE ENCOUNTER
----- Message from SUSANA Kumar sent at 10/24/2017 10:16 AM EDT -----  Contact: PATIENT  Let her have the option of Taking Catapress TTS-1 0.1 mg Patch weekly or taking clonidine 0.1 mg BID.  Scheduled vs. PRN.  Also let her know I did talk with Dr. Rey and he says that Tikosyn does not increase BP.    ----- Message -----     From: Rosi Chavez LPN     Sent: 10/23/2017   5:43 PM       To: SUSANA Kumar        ----- Message -----     From: Diya Alves     Sent: 10/23/2017   9:48 AM       To: [facility]    THE PATIENT CALLED AND STATES SHE WAS SUPPOSED TO CALL BACK AND LET BANDAR KNOW HOW HER BLOOD PRESSURE WAS RUNNING.  SHE STATES AFTER STARTING HER MEDICATION ON Friday IT /76 4 HOURS LATER 162/80 SO SHE TOOK A CLONIDINE.  ON Saturday IT /82 THEN 174/75 SO SHE TOOK A CLONIDINE.  Sunday 159/74 THEN AT 6PM IT /80 SO SHE TOOK A CLONIDINE.  THIS MORNING IT /67.  SHE STATES AS THE DAY GOES ON HER BLOOD PRESSURE GOES UP.  SHE CAN BE REACHED [phone].  THANKS        Spoke with  and he states that patient decided to take Clonidine 0.1 mg BID scheduled. I have instructed him to have her call with any further problems.

## 2017-11-02 ENCOUNTER — TELEPHONE (OUTPATIENT)
Dept: CARDIOLOGY | Facility: CLINIC | Age: 57
End: 2017-11-02

## 2017-11-02 DIAGNOSIS — R93.429 ABNORMAL RENAL ULTRASOUND: Primary | ICD-10-CM

## 2017-11-02 NOTE — TELEPHONE ENCOUNTER
Patient was called with us results. We will refer patient to renal dr. Either with Dr. Kelly or Russell

## 2017-11-02 NOTE — TELEPHONE ENCOUNTER
----- Message from Eliza Cook sent at 11/1/2017  4:01 PM EDT -----  PATIENT REQUEST CALL FOR RESULTS OF RENAL ULTRASOUND DONE AT [facility].

## 2017-12-04 ENCOUNTER — TELEPHONE (OUTPATIENT)
Dept: CARDIOLOGY | Facility: CLINIC | Age: 57
End: 2017-12-04

## 2017-12-04 DIAGNOSIS — I48.0 PAROXYSMAL ATRIAL FIBRILLATION (HCC): ICD-10-CM

## 2017-12-04 DIAGNOSIS — R60.9 PERIPHERAL EDEMA: ICD-10-CM

## 2017-12-04 RX ORDER — FUROSEMIDE 20 MG/1
20 TABLET ORAL DAILY PRN
Qty: 20 TABLET | Refills: 3 | Status: SHIPPED | OUTPATIENT
Start: 2017-12-04 | End: 2020-08-19 | Stop reason: SDUPTHER

## 2017-12-04 RX ORDER — DILTIAZEM HYDROCHLORIDE 240 MG/1
240 CAPSULE, COATED, EXTENDED RELEASE ORAL DAILY
Qty: 30 CAPSULE | Refills: 5 | Status: SHIPPED | OUTPATIENT
Start: 2017-12-04 | End: 2018-04-19 | Stop reason: SDUPTHER

## 2017-12-04 RX ORDER — DABIGATRAN ETEXILATE 150 MG/1
150 CAPSULE ORAL 2 TIMES DAILY
Qty: 60 CAPSULE | Refills: 7 | Status: SHIPPED | OUTPATIENT
Start: 2017-12-04 | End: 2018-12-06 | Stop reason: SDUPTHER

## 2017-12-04 NOTE — TELEPHONE ENCOUNTER
----- Message from Diya Alves sent at 12/4/2017  4:22 PM EST -----  Contact: PATIENT  THE PATIENT CALLED AND STATES SHE CALLED AND LEFT A MESSAGE EARLIER SHE IS TOTALLY OUT OF HER MEDICATIONS AND NEEDS THOSE CALLED IN TO [facility].  IF THERE IS AN ISSUE SHE CAN BE REACHED [phone].  THANKS

## 2017-12-05 RX ORDER — PANTOPRAZOLE SODIUM 40 MG/1
40 TABLET, DELAYED RELEASE ORAL DAILY
Qty: 30 TABLET | Refills: 6 | Status: SHIPPED | OUTPATIENT
Start: 2017-12-05 | End: 2018-06-27 | Stop reason: SDUPTHER

## 2017-12-10 DIAGNOSIS — I10 ESSENTIAL HYPERTENSION: ICD-10-CM

## 2017-12-11 RX ORDER — CLONIDINE HYDROCHLORIDE 0.1 MG/1
TABLET ORAL
Qty: 30 TABLET | Refills: 3 | Status: SHIPPED | OUTPATIENT
Start: 2017-12-11 | End: 2018-04-19 | Stop reason: ALTCHOICE

## 2017-12-14 ENCOUNTER — OFFICE VISIT (OUTPATIENT)
Dept: CARDIOLOGY | Facility: CLINIC | Age: 57
End: 2017-12-14

## 2017-12-14 VITALS
WEIGHT: 291.6 LBS | HEART RATE: 74 BPM | DIASTOLIC BLOOD PRESSURE: 78 MMHG | BODY MASS INDEX: 46.86 KG/M2 | OXYGEN SATURATION: 95 % | HEIGHT: 66 IN | SYSTOLIC BLOOD PRESSURE: 177 MMHG

## 2017-12-14 DIAGNOSIS — R21 RASH, SKIN: ICD-10-CM

## 2017-12-14 DIAGNOSIS — I48.19 PERSISTENT ATRIAL FIBRILLATION (HCC): ICD-10-CM

## 2017-12-14 DIAGNOSIS — I10 ESSENTIAL HYPERTENSION: Primary | ICD-10-CM

## 2017-12-14 DIAGNOSIS — R60.9 PERIPHERAL EDEMA: ICD-10-CM

## 2017-12-14 DIAGNOSIS — Z00.00 HEALTHCARE MAINTENANCE: ICD-10-CM

## 2017-12-14 PROCEDURE — 99213 OFFICE O/P EST LOW 20 MIN: CPT | Performed by: NURSE PRACTITIONER

## 2017-12-14 RX ORDER — ERGOCALCIFEROL 1.25 MG/1
CAPSULE ORAL WEEKLY
COMMUNITY
Start: 2017-11-11 | End: 2018-04-19 | Stop reason: ALTCHOICE

## 2017-12-14 RX ORDER — HYDROXYZINE HYDROCHLORIDE 25 MG/1
25 TABLET, FILM COATED ORAL 3 TIMES DAILY PRN
Qty: 30 TABLET | Refills: 1 | Status: SHIPPED | OUTPATIENT
Start: 2017-12-14 | End: 2018-04-19 | Stop reason: ALTCHOICE

## 2017-12-14 RX ORDER — SPIRONOLACTONE 25 MG/1
25 TABLET ORAL DAILY
Qty: 30 TABLET | Refills: 11 | Status: SHIPPED | OUTPATIENT
Start: 2017-12-14 | End: 2018-01-12 | Stop reason: ALTCHOICE

## 2017-12-14 RX ORDER — DOFETILIDE 0.5 MG/1
500 CAPSULE ORAL EVERY 12 HOURS SCHEDULED
Qty: 60 CAPSULE | Refills: 5 | Status: SHIPPED | OUTPATIENT
Start: 2017-12-14 | End: 2017-12-14 | Stop reason: SDUPTHER

## 2017-12-14 RX ORDER — DOFETILIDE 0.5 MG/1
500 CAPSULE ORAL EVERY 12 HOURS SCHEDULED
Qty: 60 CAPSULE | Refills: 5 | Status: SHIPPED | OUTPATIENT
Start: 2017-12-14 | End: 2018-07-03 | Stop reason: SDUPTHER

## 2017-12-14 NOTE — PATIENT INSTRUCTIONS

## 2017-12-14 NOTE — PROGRESS NOTES
Subjective   Renetta Horner is a 57 y.o. female     Chief Complaint   Patient presents with   • Hypertension     Here for 8 week f/u   • Heart Murmur   • Atrial Fibrillation   • Palpitations   • Heartburn   • Diabetes       HPI    Problem List:    1. Atrial Fibrillation   1.1 CHADS score 2; patient Pradaxa and Tikosyn  1.2 Event Monitor 4/23-5/3/16 - Afib and Afib with RVR  1.3 Ablation with Dr. Rey 1/18/17  1.4 Cardioversion with Dr. Rey 4/28/17  2. Hypertension  2.1 Echo 4/8/15 - EF > 65%; DD II; trace MR  3. Chest Pain   3.1 Stress Test 4/8/15 - no ischemia   4. Diabetes Mellitus II  5. CKD I Dr. Kelly     Patient is a 57-year-old female who presents today for follow-up with her  at her side.  She denies any chest pain, pressure, palpations, fluttering, dizziness, presyncopal, syncope, orthopnea or PND.  Does get some swelling in her ankles.  Her blood pressure is still running anywhere from 150s to 170s systolic.  She did see Dr. Kelly and he recommended starting her on Aldactone.  She denies any signs of bleeding or cerebral ischemic events.  They are getting ready to go to [place] for the holidays.    Current Outpatient Prescriptions   Medication Sig Dispense Refill   • CloNIDine (CATAPRES) 0.1 MG tablet TAKE ONE TABLET BY MOUTH THREE TIMES DAILY AS NEEDED FOR HIGH BLOOD PRESSURE (SBP >160 OR DBP >90) (Patient taking differently: takes bid. 3rd. dose prn) 30 tablet 3   • coenzyme Q10 100 MG capsule Take 50 mg by mouth Daily.     • dabigatran etexilate (PRADAXA) 150 MG capsu Take 1 capsule by mouth 2 (Two) Times a Day. 60 capsule 7   • diltiaZEM CD (CARDIZEM CD) 240 MG 24 hr capsule Take 1 capsule by mouth Daily. 30 capsule 5   • docusate sodium (COLACE) 100 MG capsule Take 200 mg by mouth Daily.     • dofetilide (TIKOSYN) 500 MCG capsule Take 1 capsule by mouth Every 12 (Twelve) Hours. 60 capsule 5   • irbesartan (AVAPRO) 300 MG tablet Take 1 tablet by mouth Every Night. 30 tablet 5   •  metFORMIN (GLUCOPHAGE) 500 MG tablet Take 500 mg by mouth 2 (Two) Times a Day With Meals.     • pantoprazole (PROTONIX) 40 MG EC tablet Take 1 tablet by mouth Daily. 30 tablet 6   • vitamin D (ERGOCALCIFEROL) 99734 units capsule capsule 1 (One) Time Per Week.     • furosemide (LASIX) 20 MG tablet Take 1 tablet by mouth Daily As Needed (edema). 20 tablet 3   • hydrOXYzine (ATARAX) 25 MG tablet Take 1 tablet by mouth 3 (Three) Times a Day As Needed for Itching. 30 tablet 1   • spironolactone (ALDACTONE) 25 MG tablet Take 1 tablet by mouth Daily. 30 tablet 11     No current facility-administered medications for this visit.        ALLERGIES    Atenolol; Indomethacin; Lisinopril; Penicillins; Other; Sulfa antibiotics; Ultram [tramadol]; Xarelto [rivaroxaban]; and Codeine    Past Medical History:   Diagnosis Date   • Arthritis    • Atrial fibrillation     CHADS-VASC = 3   • Atrial fibrillation 5/9/2016    1. Persistent Atrial Fibrillation s/p ECV X2 with IRAF and failed Flecainide     A. Diagnosed in 2015. CHADS-VASc = 3 (DM, HTN, female)      B. Xarelto & ASA stopped due to hemarthrosis of knee.        C. Restarted on Pradaxa and no major issues 150 mg BID     D. Echo 4/8/15 showed EF 65%, moderate, Grade II diastolic dysfunction, mild SERA (LA 3.9cm), normal valves     E. Myocardial Perfusion study 4/8/15: negative for ischemia, EF 68%     • Chest pain    • Diabetes mellitus     on oral agents, Type 2 , Patient states last a1c was 5.9 --DX'D 5 YEARS AGO, CHECKS BLOOD SUGAR DAILY AVERAGE 115-122   • Dizziness    • Fatigue    • GERD (gastroesophageal reflux disease)    • Headache    • Heart murmur    • Hypertension     CONTROLLED WITH MEDS PER PT    • Kidney stone    • Palpitations    • PONV (postoperative nausea and vomiting)    • Wears dentures     UPPER PLATE    • Wears dentures     upper   • Wears eyeglasses     reading       Social History     Social History   • Marital status:      Spouse name: N/A   • Number  "of children: N/A   • Years of education: N/A     Occupational History   • Not on file.     Social History Main Topics   • Smoking status: Former Smoker     Types: Cigarettes   • Smokeless tobacco: Never Used      Comment: QUIT 1997   • Alcohol use No   • Drug use: No   • Sexual activity: Defer     Other Topics Concern   • Not on file     Social History Narrative       Family History   Problem Relation Age of Onset   • Coronary artery disease Mother    • Breast cancer Mother    • Diabetes Mother    • Other Mother      Benign prostatic hypertrophy       Review of Systems   Constitutional: Positive for fatigue. Negative for diaphoresis.   HENT: Negative for rhinorrhea and sneezing.    Eyes: Positive for visual disturbance (reading glasses).   Respiratory: Negative for chest tightness and shortness of breath.    Cardiovascular: Positive for leg swelling (mildly ankles). Negative for chest pain and palpitations.   Gastrointestinal: Negative for nausea and vomiting.   Endocrine: Negative.    Genitourinary: Negative for difficulty urinating.   Musculoskeletal: Positive for arthralgias, back pain (lower back ) and myalgias. Negative for neck pain.   Skin: Negative.    Allergic/Immunologic: Positive for food allergies (turkey).   Neurological: Negative for dizziness, syncope and light-headedness.   Hematological: Negative.    Psychiatric/Behavioral: Negative.        Objective   /78 (BP Location: Left arm, Patient Position: Sitting)  Pulse 74  Ht 167.6 cm (65.98\")  Wt 132 kg (291 lb 9.6 oz)  LMP  (LMP Unknown)  SpO2 95%  BMI 47.09 kg/m2  Vitals:    12/14/17 1327   BP: 177/78   BP Location: Left arm   Patient Position: Sitting   Pulse: 74   SpO2: 95%   Weight: 132 kg (291 lb 9.6 oz)   Height: 167.6 cm (65.98\")      Lab Results (most recent)     None        Physical Exam   Constitutional: She is oriented to person, place, and time. Vital signs are normal. She appears well-developed and well-nourished. She is active " and cooperative.   HENT:   Head: Normocephalic.   Eyes: Lids are normal.   Neck: Normal carotid pulses, no hepatojugular reflux and no JVD present. Carotid bruit is not present.   Cardiovascular: Normal rate, regular rhythm and normal heart sounds.    Pulses:       Radial pulses are 2+ on the right side, and 2+ on the left side.        Dorsalis pedis pulses are 2+ on the right side, and 2+ on the left side.        Posterior tibial pulses are 2+ on the right side, and 2+ on the left side.   Trace edema BLE.    Pulmonary/Chest: Effort normal and breath sounds normal.   Abdominal: Normal appearance and bowel sounds are normal.   Neurological: She is alert and oriented to person, place, and time.   Skin: Skin is warm, dry and intact.   Psychiatric: She has a normal mood and affect. Her speech is normal and behavior is normal. Judgment and thought content normal. Cognition and memory are normal.       Procedure   Procedures         Assessment/Plan      Diagnosis Plan   1. Essential hypertension  spironolactone (ALDACTONE) 25 MG tablet    Basic Metabolic Panel    Basic Metabolic Panel   2. Persistent atrial fibrillation  dofetilide (TIKOSYN) 500 MCG capsule   3. Peripheral edema     4. Healthcare maintenance  Basic Metabolic Panel    Basic Metabolic Panel   5. Rash, skin  hydrOXYzine (ATARAX) 25 MG tablet       Return in about 8 weeks (around 2/8/2018).       hypertension-patient will start Aldactone 25 mg daily.  She will monitor her blood pressure and try to wean herself off of the clonidine.  A. fib-patient is doing very well on to consent and Peridex.  Peripheral edema-starting Aldactone this will help resolve this.  Rash on the skin from her she was previously cardioverted gave her some Atarax use for the itching.  She will continue her medication regimen.  She'll follow-up in 8 weeks or sooner if any changes.  She will get a BMP when they return from vacation to assess her creatinine and kidney  function.    Electronically signed by:

## 2018-01-05 ENCOUNTER — OFFICE VISIT (OUTPATIENT)
Dept: CARDIOLOGY | Facility: CLINIC | Age: 58
End: 2018-01-05

## 2018-01-05 VITALS
WEIGHT: 293 LBS | HEIGHT: 66 IN | DIASTOLIC BLOOD PRESSURE: 78 MMHG | SYSTOLIC BLOOD PRESSURE: 130 MMHG | HEART RATE: 67 BPM | BODY MASS INDEX: 47.09 KG/M2

## 2018-01-05 DIAGNOSIS — E66.01 OBESITY, CLASS III, BMI 40-49.9 (MORBID OBESITY) (HCC): ICD-10-CM

## 2018-01-05 DIAGNOSIS — I48.19 PERSISTENT ATRIAL FIBRILLATION (HCC): Primary | ICD-10-CM

## 2018-01-05 DIAGNOSIS — I10 ESSENTIAL HYPERTENSION: ICD-10-CM

## 2018-01-05 PROCEDURE — 93010 ELECTROCARDIOGRAM REPORT: CPT | Performed by: INTERNAL MEDICINE

## 2018-01-05 PROCEDURE — 99213 OFFICE O/P EST LOW 20 MIN: CPT | Performed by: INTERNAL MEDICINE

## 2018-01-05 RX ORDER — DOXYCYCLINE HYCLATE 100 MG/1
100 CAPSULE ORAL 2 TIMES DAILY
COMMUNITY
End: 2018-04-19 | Stop reason: ALTCHOICE

## 2018-01-05 NOTE — PROGRESS NOTES
Subjective:   Renetta Horner  1960  [phone]      01/05/2018    [facility] CARDIOLOGY    Gustabo Quach MD  [address] [facility] [address] [address] [place] [address] [place], [place] [id number]    REFERRING DOCTOR: Dr Seals      Patient ID: Renetta Horner is a 57 y.o. female.    Chief Complaint:   Chief Complaint   Patient presents with   • Atrial Fibrillation     Problem List:  1. Persistent Atrial Fibrillation s/p ECV X2 with IRAF and failed Flecainide  A. Diagnosed in 2015. CHADS-VASc = 2   B. No a/c secondary to hemarthrosis on Xarelto.   C. Echo show EF of 65%. Stress test negative for ischemia.   D. Restarted on Pradaxa and no major issues 150 mg BID  E. Pulmonary vein ablation/roof line/box in lesion along the posterior wall on 01/18/2017  F. Recurrent Afib now on Tikosyn 500 mcg BID    2. Labile HTN    Allergies   Allergen Reactions   • Atenolol Shortness Of Breath     Hard to breath   • Indomethacin Shortness Of Breath     , HIVES    • Lisinopril Shortness Of Breath   • Penicillins Anaphylaxis   • Other Itching     [place]-- hives and vomiting   • Sulfa Antibiotics Nausea And Vomiting   • Ultram [Tramadol] Nausea And Vomiting   • Xarelto [Rivaroxaban]      Bleeding knee   • Codeine Nausea Only     ITCHING    Norvasc causes swelling       Current Outpatient Prescriptions:   •  CloNIDine (CATAPRES) 0.1 MG tablet, TAKE ONE TABLET BY MOUTH THREE TIMES DAILY AS NEEDED FOR HIGH BLOOD PRESSURE (SBP >160 OR DBP >90) (Patient taking differently: takes bid. 3rd. dose prn), Disp: 30 tablet, Rfl: 3  •  dabigatran etexilate (PRADAXA) 150 MG capsu, Take 1 capsule by mouth 2 (Two) Times a Day., Disp: 60 capsule, Rfl: 7  •  diltiaZEM CD (CARDIZEM CD) 240 MG 24 hr capsule, Take 1 capsule by mouth Daily., Disp: 30 capsule, Rfl: 5  •  docusate sodium (COLACE) 100 MG capsule, Take 200 mg by mouth Daily., Disp: , Rfl:   •  dofetilide (TIKOSYN) 500 MCG capsule, Take 1 capsule by mouth Every 12 (Twelve) Hours., Disp: 60  capsule, Rfl: 5  •  doxycycline (VIBRAMYCIN) 100 MG capsule, Take 100 mg by mouth 2 (Two) Times a Day., Disp: , Rfl:   •  furosemide (LASIX) 20 MG tablet, Take 1 tablet by mouth Daily As Needed (edema)., Disp: 20 tablet, Rfl: 3  •  irbesartan (AVAPRO) 300 MG tablet, Take 1 tablet by mouth Every Night., Disp: 30 tablet, Rfl: 5  •  metFORMIN (GLUCOPHAGE) 500 MG tablet, Take 500 mg by mouth 2 (Two) Times a Day With Meals., Disp: , Rfl:   •  pantoprazole (PROTONIX) 40 MG EC tablet, Take 1 tablet by mouth Daily., Disp: 30 tablet, Rfl: 6  •  vitamin D (ERGOCALCIFEROL) 72065 units capsule capsule, 1 (One) Time Per Week., Disp: , Rfl:   •  coenzyme Q10 100 MG capsule, Take 50 mg by mouth Daily., Disp: , Rfl:   •  hydrOXYzine (ATARAX) 25 MG tablet, Take 1 tablet by mouth 3 (Three) Times a Day As Needed for Itching., Disp: 30 tablet, Rfl: 1      History of Present Illness  The patient is a 57 year old female with history Persistent Afib s/p PVA in Jan 2017 with recurrence of Afib now controlled with Tikosyn here today for follow up visit. Doing well from a Afib standpoint but the biggest issue is variable BPs with AM pressures being into into the 170s and improved throughout the day. She was on aldactone but had to stop due to cramping. She saw a nephrologist in Dec 2017 for higher pressures. Cant take Norvasc due to side effects and also micardis in the past but insurance didn't cover. Has had workup for secondary causes of HTN per the patient and family and all normal.     No issues with chest pain, shortness of breath, fevers, chills, night sweats, PND, orthopnea or palpitations. No recent ER visits or hospital stays.      The following portions of the patient's history were reviewed and updated as appropriate: allergies, current medications, past family history, past medical history, past social history, past surgical history and problem list.    ROS   14 point ROS negative except as outlined in problem list, HPI and  "other parts of the note.      ECG 12 Lead  Date/Time: 1/5/2018 11:28 AM  Performed by: COLBY SO  Authorized by: COLBY SO   Rhythm: sinus rhythm  Comments: HR 67 bpm, QTc ok               Objective:       Vitals:    01/05/18 1101   BP: 130/78   BP Location: Right arm   Patient Position: Sitting   Pulse: 67   Weight: 134 kg (296 lb 4.8 oz)   Height: 167.6 cm (66\")       GENERAL: Well-developed, well-nourished patient in no acute distress.  HEENT: Normocephalic, atraumatic, PERRLA. Moist mucous membranes.  NECK: No JVD present at 30°. No carotid bruits auscultated.  LUNGS: Clear to auscultation.  CARDIOVASCULAR: Heart has a regular rate and rhythm. No murmurs, gallops or rubs noted.   ABDOMEN: Soft, nontender. Positive bowel sounds.  MUSCULOSKELETAL: No gross deformities. No clubbing, cyanosis, or lower extremity edema.  SKIN: Pink, warm  Neuro: Nonfocal exam. Gait intact  Ext: No edema or bruising    The patient's old records including ambulatory rhythm recordings (ECGs, Holter/event monitor) were reviewed and discussed.      Lab Review:   Results for orders placed or performed during the hospital encounter of 06/07/17   Basic Metabolic Panel   Result Value Ref Range    Glucose 118 (H) 70 - 100 mg/dL    BUN 13 9 - 23 mg/dL    Creatinine 0.60 0.60 - 1.30 mg/dL    Sodium 136 132 - 146 mmol/L    Potassium 4.2 3.5 - 5.5 mmol/L    Chloride 103 99 - 109 mmol/L    CO2 26.0 20.0 - 31.0 mmol/L    Calcium 9.6 8.7 - 10.4 mg/dL    eGFR Non African Amer 103 >60 mL/min/1.73    BUN/Creatinine Ratio 21.7 7.0 - 25.0    Anion Gap 7.0 3.0 - 11.0 mmol/L   Magnesium   Result Value Ref Range    Magnesium 1.9 1.3 - 2.7 mg/dL   Magnesium   Result Value Ref Range    Magnesium 2.2 1.3 - 2.7 mg/dL   POC Glucose Fingerstick   Result Value Ref Range    Glucose 88 70 - 130 mg/dL   POC Glucose Fingerstick   Result Value Ref Range    Glucose 123 70 - 130 mg/dL   POC Glucose Fingerstick   Result Value Ref Range    Glucose 134 (H) 70 - " 130 mg/dL   POC Glucose Fingerstick   Result Value Ref Range    Glucose 129 70 - 130 mg/dL   POC Glucose Fingerstick   Result Value Ref Range    Glucose 118 70 - 130 mg/dL   POC Glucose Fingerstick   Result Value Ref Range    Glucose 122 70 - 130 mg/dL   POC Glucose Fingerstick   Result Value Ref Range    Glucose 133 (H) 70 - 130 mg/dL   POC Glucose Fingerstick   Result Value Ref Range    Glucose 129 70 - 130 mg/dL   POC Glucose Fingerstick   Result Value Ref Range    Glucose 97 70 - 130 mg/dL           Diagnosis:   1. Persistent atrial fibrillation  2. Obesity, Class III, BMI 40-49.9 (morbid obesity)  3. Essential hypertension      Assessment & Plan:   1. Persistent Afib s/p PVA in Jan 2017 with recurrences of Afib and now controlled on Tikosyn 500 mcg BID, Cardizem and Pradaxa. Continue on current medical Rx  2. Labile HTN with higher pressures during the day mostly. Has tried multiple medications and will follow up with Nephrology and Dr Seals. Could consider something like Coreg vs Labetalol for better BP control  3. Follow up with me in 6 mths or sooner as needed and also follow up with Dr Seals in the near future to help with BP issues.          CC: Dr Mt Rey, DO  01/05/18  11:14 AM      EMR Dragon/Transcription disclaimer:  Much of this encounter note is an electronic transcription/translation of spoken language to printed text. Electronic translation of spoken language may permit erroneous, or at times, nonsensical words or phrases to be inadvertently transcribed. Although I have reviewed the note for such errors, some may still exist.

## 2018-01-08 ENCOUNTER — TELEPHONE (OUTPATIENT)
Dept: CARDIOLOGY | Facility: CLINIC | Age: 58
End: 2018-01-08

## 2018-01-08 NOTE — TELEPHONE ENCOUNTER
Patient was aware of results. She states that she is wanting to see Katelynn as soon as Possible concerning her blood pressure.

## 2018-01-08 NOTE — TELEPHONE ENCOUNTER
----- Message from SUSANA Kumar sent at 1/2/2018  9:09 AM EST -----  Please advise patient kidney function and potassium are perfect, ask how she is doing on the aldactone and how her BP is.  Thanks!  ----- Message -----     From: Chanda Madrid     Sent: 1/2/2018   8:56 AM       To: SUSANA Kumar

## 2018-01-12 ENCOUNTER — OFFICE VISIT (OUTPATIENT)
Dept: CARDIOLOGY | Facility: CLINIC | Age: 58
End: 2018-01-12

## 2018-01-12 VITALS
BODY MASS INDEX: 46.41 KG/M2 | HEART RATE: 75 BPM | HEIGHT: 66 IN | SYSTOLIC BLOOD PRESSURE: 126 MMHG | DIASTOLIC BLOOD PRESSURE: 62 MMHG | OXYGEN SATURATION: 93 % | WEIGHT: 288.8 LBS

## 2018-01-12 DIAGNOSIS — I10 ESSENTIAL HYPERTENSION: Primary | ICD-10-CM

## 2018-01-12 DIAGNOSIS — I48.19 PERSISTENT ATRIAL FIBRILLATION (HCC): ICD-10-CM

## 2018-01-12 PROCEDURE — 99213 OFFICE O/P EST LOW 20 MIN: CPT | Performed by: NURSE PRACTITIONER

## 2018-01-12 RX ORDER — CARVEDILOL 6.25 MG/1
6.25 TABLET ORAL DAILY
Qty: 30 TABLET | Refills: 5 | Status: SHIPPED | OUTPATIENT
Start: 2018-01-12 | End: 2018-04-19 | Stop reason: ALTCHOICE

## 2018-01-12 NOTE — PATIENT INSTRUCTIONS
Hypertension  Hypertension, commonly called high blood pressure, is when the force of blood pumping through your arteries is too strong. Your arteries are the blood vessels that carry blood from your heart throughout your body. A blood pressure reading consists of a higher number over a lower number, such as 110/72. The higher number (systolic) is the pressure inside your arteries when your heart pumps. The lower number (diastolic) is the pressure inside your arteries when your heart relaxes. Ideally you want your blood pressure below 120/80.  Hypertension forces your heart to work harder to pump blood. Your arteries may become narrow or stiff. Having untreated or uncontrolled hypertension can cause heart attack, stroke, kidney disease, and other problems.  What increases the risk?  Some risk factors for high blood pressure are controllable. Others are not.  Risk factors you cannot control include:  · Race. You may be at higher risk if you are .  · Age. Risk increases with age.  · Gender. Men are at higher risk than women before age 45 years. After age 65, women are at higher risk than men.  Risk factors you can control include:  · Not getting enough exercise or physical activity.  · Being overweight.  · Getting too much fat, sugar, calories, or salt in your diet.  · Drinking too much alcohol.  What are the signs or symptoms?  Hypertension does not usually cause signs or symptoms. Extremely high blood pressure (hypertensive crisis) may cause headache, anxiety, shortness of breath, and nosebleed.  How is this diagnosed?  To check if you have hypertension, your health care provider will measure your blood pressure while you are seated, with your arm held at the level of your heart. It should be measured at least twice using the same arm. Certain conditions can cause a difference in blood pressure between your right and left arms. A blood pressure reading that is higher than normal on one occasion does  not mean that you need treatment. If it is not clear whether you have high blood pressure, you may be asked to return on a different day to have your blood pressure checked again. Or, you may be asked to monitor your blood pressure at home for 1 or more weeks.  How is this treated?  Treating high blood pressure includes making lifestyle changes and possibly taking medicine. Living a healthy lifestyle can help lower high blood pressure. You may need to change some of your habits.  Lifestyle changes may include:  · Following the DASH diet. This diet is high in fruits, vegetables, and whole grains. It is low in salt, red meat, and added sugars.  · Keep your sodium intake below 2,300 mg per day.  · Getting at least 30-45 minutes of aerobic exercise at least 4 times per week.  · Losing weight if necessary.  · Not smoking.  · Limiting alcoholic beverages.  · Learning ways to reduce stress.  Your health care provider may prescribe medicine if lifestyle changes are not enough to get your blood pressure under control, and if one of the following is true:  · You are 18-59 years of age and your systolic blood pressure is above 140.  · You are 60 years of age or older, and your systolic blood pressure is above 150.  · Your diastolic blood pressure is above 90.  · You have diabetes, and your systolic blood pressure is over 140 or your diastolic blood pressure is over 90.  · You have kidney disease and your blood pressure is above 140/90.  · You have heart disease and your blood pressure is above 140/90.  Your personal target blood pressure may vary depending on your medical conditions, your age, and other factors.  Follow these instructions at home:  · Have your blood pressure rechecked as directed by your health care provider.  · Take medicines only as directed by your health care provider. Follow the directions carefully. Blood pressure medicines must be taken as prescribed. The medicine does not work as well when you skip  doses. Skipping doses also puts you at risk for problems.  · Do not smoke.  · Monitor your blood pressure at home as directed by your health care provider.  Contact a health care provider if:  · You think you are having a reaction to medicines taken.  · You have recurrent headaches or feel dizzy.  · You have swelling in your ankles.  · You have trouble with your vision.  Get help right away if:  · You develop a severe headache or confusion.  · You have unusual weakness, numbness, or feel faint.  · You have severe chest or abdominal pain.  · You vomit repeatedly.  · You have trouble breathing.  This information is not intended to replace advice given to you by your health care provider. Make sure you discuss any questions you have with your health care provider.  Document Released: 12/18/2006 Document Revised: 05/25/2017 Document Reviewed: 10/10/2014  [employer] Interactive Patient Education © 2017 [employer]

## 2018-01-12 NOTE — PROGRESS NOTES
Subjective   Renetta Horner is a 57 y.o. female     Chief Complaint   Patient presents with   • Hypertension     presents for hypertension issues        HPI    Problem List:    1. Atrial Fibrillation   1.1 CHADS score 2; patient Pradaxa and Tikosyn  1.2 Event Monitor 4/23-5/3/16 - Afib and Afib with RVR  1.3 Ablation with Dr. Rey 1/18/17  1.4 Cardioversion with Dr. Rey 4/28/17  2. Hypertension  2.1 Echo 4/8/15 - EF > 65%; DD II; trace MR  3. Chest Pain   3.1 Stress Test 4/8/15 - no ischemia   4. Diabetes Mellitus II  5. CKD I Dr. Kelly     Patient is a 57-year-old female who presents today for a follow-up with her  at her side.  She denies any chest pain, pressure, palpitations, fluttering, dizziness, presyncope, syncope, orthopnea, PND or edema.  She denies any shortness of breath with activity.  She says she was intolerant to the Aldactone as it was causing her to have severe cramping in her legs.  Most recent lab work was excellent.  She did see Dr. Trejo and he recommended that we try Coreg and/or Lopressor.  Patient did have shortness of breath with atenolol however she will get one of those at try.  She says her most issue with her blood pressure is when she first gets up in the morning.  She denies any signs of bleeding or cerebral ischemic events.    Current Outpatient Prescriptions   Medication Sig Dispense Refill   • CloNIDine (CATAPRES) 0.1 MG tablet TAKE ONE TABLET BY MOUTH THREE TIMES DAILY AS NEEDED FOR HIGH BLOOD PRESSURE (SBP >160 OR DBP >90) (Patient taking differently: takes bid. 3rd. dose prn) 30 tablet 3   • coenzyme Q10 100 MG capsule Take 50 mg by mouth Daily.     • dabigatran etexilate (PRADAXA) 150 MG capsu Take 1 capsule by mouth 2 (Two) Times a Day. 60 capsule 7   • diltiaZEM CD (CARDIZEM CD) 240 MG 24 hr capsule Take 1 capsule by mouth Daily. 30 capsule 5   • docusate sodium (COLACE) 100 MG capsule Take 200 mg by mouth Daily.     • dofetilide (TIKOSYN) 500 MCG capsule Take  1 capsule by mouth Every 12 (Twelve) Hours. 60 capsule 5   • doxycycline (VIBRAMYCIN) 100 MG capsule Take 100 mg by mouth 2 (Two) Times a Day.     • furosemide (LASIX) 20 MG tablet Take 1 tablet by mouth Daily As Needed (edema). 20 tablet 3   • hydrOXYzine (ATARAX) 25 MG tablet Take 1 tablet by mouth 3 (Three) Times a Day As Needed for Itching. 30 tablet 1   • irbesartan (AVAPRO) 300 MG tablet Take 1 tablet by mouth Every Night. 30 tablet 5   • metFORMIN (GLUCOPHAGE) 500 MG tablet Take 500 mg by mouth 2 (Two) Times a Day With Meals.     • pantoprazole (PROTONIX) 40 MG EC tablet Take 1 tablet by mouth Daily. 30 tablet 6   • vitamin D (ERGOCALCIFEROL) 55371 units capsule capsule 1 (One) Time Per Week.     • carvedilol (COREG) 6.25 MG tablet Take 1 tablet by mouth Daily. 30 tablet 5     No current facility-administered medications for this visit.        ALLERGIES    Atenolol; Indomethacin; Lisinopril; Penicillins; Other; Sulfa antibiotics; Ultram [tramadol]; Xarelto [rivaroxaban]; and Codeine    Past Medical History:   Diagnosis Date   • Arthritis    • Atrial fibrillation     CHADS-VASC = 3   • Atrial fibrillation 5/9/2016    1. Persistent Atrial Fibrillation s/p ECV X2 with IRAF and failed Flecainide     A. Diagnosed in 2015. CHADS-VASc = 3 (DM, HTN, female)      B. Xarelto & ASA stopped due to hemarthrosis of knee.        C. Restarted on Pradaxa and no major issues 150 mg BID     D. Echo 4/8/15 showed EF 65%, moderate, Grade II diastolic dysfunction, mild SERA (LA 3.9cm), normal valves     E. Myocardial Perfusion study 4/8/15: negative for ischemia, EF 68%     • Chest pain    • Diabetes mellitus     on oral agents, Type 2 , Patient states last a1c was 5.9 --DX'D 5 YEARS AGO, CHECKS BLOOD SUGAR DAILY AVERAGE 115-122   • Dizziness    • Fatigue    • GERD (gastroesophageal reflux disease)    • Headache    • Heart murmur    • Hypertension     CONTROLLED WITH MEDS PER PT    • Kidney stone    • Palpitations    • PONV  "(postoperative nausea and vomiting)    • Wears dentures     UPPER PLATE    • Wears dentures     upper   • Wears eyeglasses     reading       Social History     Social History   • Marital status:      Spouse name: N/A   • Number of children: N/A   • Years of education: N/A     Occupational History   • Not on file.     Social History Main Topics   • Smoking status: Former Smoker     Types: Cigarettes   • Smokeless tobacco: Never Used      Comment: QUIT 1997   • Alcohol use No   • Drug use: No   • Sexual activity: Defer     Other Topics Concern   • Not on file     Social History Narrative       Family History   Problem Relation Age of Onset   • Coronary artery disease Mother    • Breast cancer Mother    • Diabetes Mother    • Other Mother      Benign prostatic hypertrophy       Review of Systems   Constitutional: Positive for fatigue. Negative for diaphoresis.   HENT: Negative for rhinorrhea, sinus pressure and sneezing.    Eyes: Positive for visual disturbance ( wears reading glasses).   Respiratory: Negative for chest tightness and shortness of breath.    Cardiovascular: Negative for chest pain, palpitations and leg swelling.   Gastrointestinal: Negative for constipation, diarrhea, nausea and vomiting.   Endocrine: Negative.    Genitourinary: Negative for difficulty urinating.   Musculoskeletal: Positive for myalgias (leg swelling while on aldactone ). Negative for arthralgias, back pain and neck pain.   Skin: Negative.    Allergic/Immunologic: Negative for environmental allergies.   Neurological: Positive for headaches ( when blood pressure is up ). Negative for dizziness, syncope and light-headedness.   Hematological: Does not bruise/bleed easily.   Psychiatric/Behavioral: The patient is not nervous/anxious.        Objective   /62 (BP Location: Left arm, Patient Position: Sitting)  Pulse 75  Ht 167.6 cm (66\")  Wt 131 kg (288 lb 12.8 oz)  LMP  (LMP Unknown)  SpO2 93%  BMI 46.61 kg/m2  Vitals:    " "01/12/18 0927   BP: 126/62   BP Location: Left arm   Patient Position: Sitting   Pulse: 75   SpO2: 93%   Weight: 131 kg (288 lb 12.8 oz)   Height: 167.6 cm (66\")      Lab Results (most recent)     None        Physical Exam   Constitutional: She is oriented to person, place, and time. Vital signs are normal. She appears well-developed and well-nourished. She is active and cooperative.   HENT:   Head: Normocephalic.   Eyes: Lids are normal.   Neck: Normal carotid pulses, no hepatojugular reflux and no JVD present. Carotid bruit is not present.   Cardiovascular: Normal rate, regular rhythm and normal heart sounds.    Pulses:       Radial pulses are 2+ on the right side, and 2+ on the left side.        Dorsalis pedis pulses are 2+ on the right side, and 2+ on the left side.        Posterior tibial pulses are 2+ on the right side, and 2+ on the left side.   Nonpitting edema ankles BLE    Pulmonary/Chest: Effort normal and breath sounds normal.   Abdominal: Normal appearance and bowel sounds are normal.   Neurological: She is alert and oriented to person, place, and time.   Skin: Skin is warm, dry and intact.   Psychiatric: She has a normal mood and affect. Her speech is normal and behavior is normal. Judgment and thought content normal. Cognition and memory are normal.       Procedure   Procedures         Assessment/Plan      Diagnosis Plan   1. Essential hypertension  carvedilol (COREG) 6.25 MG tablet   2. Persistent atrial fibrillation         Return in about 8 weeks (around 3/9/2018).         Hypertension-patient will start Coreg 6.25 only in the afternoons.  She will try to Back to only one clonidine a day.  We will eventually try to wean her off of that as well.  She will monitor her blood pressure and heart rate.  A. fib-patient is on diltiazem, Tikosyn and Pradaxa.  She will continue her medication regimen otherwise.  She will follow-up in 8 weeks or sooner if any changes.    Electronically signed by:         "

## 2018-01-17 ENCOUNTER — TELEPHONE (OUTPATIENT)
Dept: CARDIOLOGY | Facility: CLINIC | Age: 58
End: 2018-01-17

## 2018-01-17 DIAGNOSIS — E13.8 DIABETES MELLITUS OF OTHER TYPE WITH COMPLICATION, UNSPECIFIED LONG TERM INSULIN USE STATUS: ICD-10-CM

## 2018-01-17 DIAGNOSIS — Z79.899 ENCOUNTER FOR LONG-TERM (CURRENT) USE OF MEDICATIONS: ICD-10-CM

## 2018-01-17 DIAGNOSIS — R10.9 ABDOMINAL CRAMPING: ICD-10-CM

## 2018-01-17 DIAGNOSIS — I48.91 ATRIAL FIBRILLATION, UNSPECIFIED TYPE (HCC): ICD-10-CM

## 2018-01-17 DIAGNOSIS — R25.2 LEG CRAMPING: Primary | ICD-10-CM

## 2018-01-17 DIAGNOSIS — I10 ESSENTIAL HYPERTENSION: ICD-10-CM

## 2018-01-17 NOTE — TELEPHONE ENCOUNTER
----- Message from Eliza Cook sent at 1/17/2018  2:03 PM EST -----  Contact: [phone]  Patient is having extreme muscle twitching and cramping. She request a call from a nurse to discuss this. She has not started her Coreg yet with her BP running low.

## 2018-01-17 NOTE — TELEPHONE ENCOUNTER
"Patient states having increased cramping,ankles \"curve in\", twitching in abd./legs/thighs and it is extremely painful. states sx's have increased despite being off aldactone. States called family pcp office but could not reach anybody. Also states she was prescribed coreg at last visit, but has not started it yest. Due to following b/p's before meds: 13th. am 135/73, 14th. Am 155/81, 15th. Am 171/89 took last dose of clonidine, 17  am 158/87 took irbesartan at 11:00 am 127/69 and at 4:00 pm 122/76. States b/p's are always high in am before meds, takes b/p meds around 7:30-8:30 am, and b/p's remain good all afternoon and evening. H/r's runnin,80,74,62,65. Will discuss all above with TJ Del Rio. And call patient back. PH,LPN  "

## 2018-01-18 NOTE — TELEPHONE ENCOUNTER
VERBAL ORDERS PER BANDAR MONTERO NP TO HAVE BMP AND MAG DRAWN, START COREG IF B/P STAYS ELEVATED AFTER MEDS AND USE CLONIDINE PRN. CALLED AND RELAYED THIS TO PATIENT, REQUESTED LAB ORDER BE MAILED TO HER BECAUSE IT WILL BE NEXT WEEK BEFORE SHE CAN GET IT DONE, AND VERBALIZED SHE UNDERSTOOD WHEN TO START THE COREG AND TAKE CLONIDINE. PH,LPN

## 2018-01-30 ENCOUNTER — TELEPHONE (OUTPATIENT)
Dept: CARDIOLOGY | Facility: CLINIC | Age: 58
End: 2018-01-30

## 2018-01-30 NOTE — TELEPHONE ENCOUNTER
----- Message from SUSANA Kumar sent at 1/26/2018  9:21 AM EST -----  Advise patient labs great.  ----- Message -----     From: Chanda Madrid     Sent: 1/2/2018   8:56 AM       To: SUSANA Kumar

## 2018-04-13 DIAGNOSIS — I10 ESSENTIAL HYPERTENSION: ICD-10-CM

## 2018-04-13 RX ORDER — IRBESARTAN 300 MG/1
TABLET ORAL
Qty: 30 TABLET | Refills: 5 | Status: SHIPPED | OUTPATIENT
Start: 2018-04-13 | End: 2018-09-29 | Stop reason: SDUPTHER

## 2018-04-19 ENCOUNTER — OFFICE VISIT (OUTPATIENT)
Dept: CARDIOLOGY | Facility: CLINIC | Age: 58
End: 2018-04-19

## 2018-04-19 VITALS
SYSTOLIC BLOOD PRESSURE: 151 MMHG | DIASTOLIC BLOOD PRESSURE: 69 MMHG | HEIGHT: 66 IN | WEIGHT: 263.8 LBS | BODY MASS INDEX: 42.4 KG/M2 | OXYGEN SATURATION: 96 % | HEART RATE: 67 BPM

## 2018-04-19 DIAGNOSIS — I48.0 PAROXYSMAL ATRIAL FIBRILLATION (HCC): ICD-10-CM

## 2018-04-19 DIAGNOSIS — R60.9 PERIPHERAL EDEMA: ICD-10-CM

## 2018-04-19 DIAGNOSIS — I48.19 PERSISTENT ATRIAL FIBRILLATION (HCC): ICD-10-CM

## 2018-04-19 DIAGNOSIS — I10 ESSENTIAL HYPERTENSION: Primary | ICD-10-CM

## 2018-04-19 DIAGNOSIS — R00.2 PALPITATIONS: ICD-10-CM

## 2018-04-19 PROCEDURE — 99213 OFFICE O/P EST LOW 20 MIN: CPT | Performed by: NURSE PRACTITIONER

## 2018-04-19 RX ORDER — DILTIAZEM HYDROCHLORIDE 240 MG/1
240 CAPSULE, COATED, EXTENDED RELEASE ORAL DAILY
Qty: 30 CAPSULE | Refills: 11 | Status: SHIPPED | OUTPATIENT
Start: 2018-04-19 | End: 2019-04-25 | Stop reason: SDUPTHER

## 2018-04-19 NOTE — PATIENT INSTRUCTIONS
Obesity, Adult  Obesity is the condition of having too much total body fat. Being overweight or obese means that your weight is greater than what is considered healthy for your body size. Obesity is determined by a measurement called BMI. BMI is an estimate of body fat and is calculated from height and weight. For adults, a BMI of 30 or higher is considered obese.  Obesity can eventually lead to other health concerns and major illnesses, including:  · Stroke.  · Coronary artery disease (CAD).  · Type 2 diabetes.  · Some types of cancer, including cancers of the colon, breast, uterus, and gallbladder.  · Osteoarthritis.  · High blood pressure (hypertension).  · High cholesterol.  · Sleep apnea.  · Gallbladder stones.  · Infertility problems.  What are the causes?  The main cause of obesity is taking in (consuming) more calories than your body uses for energy. Other factors that contribute to this condition may include:  · Being born with genes that make you more likely to become obese.  · Having a medical condition that causes obesity. These conditions include:  ¨ Hypothyroidism.  ¨ Polycystic ovarian syndrome (PCOS).  ¨ Binge-eating disorder.  ¨ Cushing syndrome.  · Taking certain medicines, such as steroids, antidepressants, and seizure medicines.  · Not being physically active (sedentary lifestyle).  · Living where there are limited places to exercise safely or buy healthy foods.  · Not getting enough sleep.  What increases the risk?  The following factors may increase your risk of this condition:  · Having a family history of obesity.  · Being a woman of -American descent.  · Being a man of  descent.  What are the signs or symptoms?  Having excessive body fat is the main symptom of this condition.  How is this diagnosed?  This condition may be diagnosed based on:  · Your symptoms.  · Your medical history.  · A physical exam. Your health care provider may measure:  ¨ Your BMI. If you are an adult  with a BMI between 25 and less than 30, you are considered overweight. If you are an adult with a BMI of 30 or higher, you are considered obese.  ¨ The distances around your hips and your waist (circumferences). These may be compared to each other to help diagnose your condition.  ¨ Your skinfold thickness. Your health care provider may gently pinch a fold of your skin and measure it.  How is this treated?  Treatment for this condition often includes changing your lifestyle. Treatment may include some or all of the following:  · Dietary changes. Work with your health care provider and a dietitian to set a weight-loss goal that is healthy and reasonable for you. Dietary changes may include eating:  ¨ Smaller portions. A portion size is the amount of a particular food that is healthy for you to eat at one time. This varies from person to person.  ¨ Low-calorie or low-fat options.  ¨ More whole grains, fruits, and vegetables.  · Regular physical activity. This may include aerobic activity (cardio) and strength training.  · Medicine to help you lose weight. Your health care provider may prescribe medicine if you are unable to lose 1 pound a week after 6 weeks of eating more healthily and doing more physical activity.  · Surgery. Surgical options may include gastric banding and gastric bypass. Surgery may be done if:  ¨ Other treatments have not helped to improve your condition.  ¨ You have a BMI of 40 or higher.  ¨ You have life-threatening health problems related to obesity.  Follow these instructions at home:     Eating and drinking     · Follow recommendations from your health care provider about what you eat and drink. Your health care provider may advise you to:  ¨ Limit fast foods, sweets, and processed snack foods.  ¨ Choose low-fat options, such as low-fat milk instead of whole milk.  ¨ Eat 5 or more servings of fruits or vegetables every day.  ¨ Eat at home more often. This gives you more control over what you  eat.  ¨ Choose healthy foods when you eat out.  ¨ Learn what a healthy portion size is.  ¨ Keep low-fat snacks on hand.  ¨ Avoid sugary drinks, such as soda, fruit juice, iced tea sweetened with sugar, and flavored milk.  ¨ Eat a healthy breakfast.  · Drink enough water to keep your urine clear or pale yellow.  · Do not go without eating for long periods of time (do not fast) or follow a fad diet. Fasting and fad diets can be unhealthy and even dangerous.  Physical Activity   · Exercise regularly, as told by your health care provider. Ask your health care provider what types of exercise are safe for you and how often you should exercise.  · Warm up and stretch before being active.  · Cool down and stretch after being active.  · Rest between periods of activity.  Lifestyle   · Limit the time that you spend in front of your TV, computer, or video game system.  · Find ways to reward yourself that do not involve food.  · Limit alcohol intake to no more than 1 drink a day for nonpregnant women and 2 drinks a day for men. One drink equals 12 oz of beer, 5 oz of wine, or 1½ oz of hard liquor.  General instructions   · Keep a weight loss journal to keep track of the food you eat and how much you exercise you get.  · Take over-the-counter and prescription medicines only as told by your health care provider.  · Take vitamins and supplements only as told by your health care provider.  · Consider joining a support group. Your health care provider may be able to recommend a support group.  · Keep all follow-up visits as told by your health care provider. This is important.  Contact a health care provider if:  · You are unable to meet your weight loss goal after 6 weeks of dietary and lifestyle changes.  This information is not intended to replace advice given to you by your health care provider. Make sure you discuss any questions you have with your health care provider.  Document Released: 01/25/2006 Document Revised:  05/22/2017 Document Reviewed: 10/05/2016  [employer] Interactive Patient Education © 2017 [employer]  MyPlate from [employer]  The general, healthful diet is based on the 2010 Dietary Guidelines for Americans. The amount of food you need to eat from each food group depends on your age, sex, and level of physical activity and can be individualized by a dietitian. Go to ChooseMyPlate.gov for more information.  What do I need to know about the MyPlate plan?  · Enjoy your food, but eat less.  · Avoid oversized portions.  ¨ ½ of your plate should include fruits and vegetables.  ¨ ¼ of your plate should be grains.  ¨ ¼ of your plate should be protein.  Grains   · Make at least half of your grains whole grains.  · For a 2,000 calorie daily food plan, eat 6 oz every day.  · 1 oz is about 1 slice bread, 1 cup cereal, or ½ cup cooked rice, cereal, or pasta.  Vegetables   · Make half your plate fruits and vegetables.  · For a 2,000 calorie daily food plan, eat 2½ cups every day.  · 1 cup is about 1 cup raw or cooked vegetables or vegetable juice or 2 cups raw leafy greens.  Fruits   · Make half your plate fruits and vegetables.  · For a 2,000 calorie daily food plan, eat 2 cups every day.  · 1 cup is about 1 cup fruit or 100% fruit juice or ½ cup dried fruit.  Protein   · For a 2,000 calorie daily food plan, eat 5½ oz every day.  · 1 oz is about 1 oz meat, poultry, or fish, ¼ cup cooked beans, 1 egg, 1 Tbsp peanut butter, or ½ oz nuts or seeds.  Dairy   · Switch to fat-free or low-fat (1%) milk.  · For a 2,000 calorie daily food plan, eat 3 cups every day.  · 1 cup is about 1 cup milk or yogurt or soy milk (soy beverage), 1½ oz natural cheese, or 2 oz processed cheese.  Fats, Oils, and Empty Calories   · Only small amounts of oils are recommended.  · Empty calories are calories from solid fats or added sugars.  · Compare sodium in foods like soup, bread, and frozen meals. Choose the foods with lower numbers.  · Drink water instead  of sugary drinks.  What foods can I eat?  Grains   Whole grains such as whole wheat, quinoa, millet, and bulgur. Bread, rolls, and pasta made from whole grains. Brown or wild rice. Hot or cold cereals made from whole grains and without added sugar.  Vegetables   All fresh vegetables, especially fresh red, dark green, or orange vegetables. Peas and beans. Low-sodium frozen or canned vegetables prepared without added salt. Low-sodium vegetable juices.  Fruits   All fresh, frozen, and dried fruits. Canned fruit packed in water or fruit juice without added sugar. Fruit juices without added sugar.  Meats and Other Protein Sources   Boiled, baked, or grilled lean meat trimmed of fat. Skinless poultry. Fresh seafood and shellfish. Canned seafood packed in water. Unsalted nuts and unsalted nut butters. Tofu. Dried beans and pea. Eggs.  Dairy   Low-fat or fat-free milk, yogurt, and cheeses.  Sweets and Desserts   Frozen desserts made from low-fat milk.  Fats and Oils   Olive, peanut, and canola oils and margarine. Salad dressing and mayonnaise made from these oils.  Other   Soups and casseroles made from allowed ingredients and without added fat or salt.  The items listed above may not be a complete list of recommended foods or beverages. Contact your dietitian for more options.   What foods are not recommended?  Grains   Sweetened, low-fiber cereals. Packaged baked goods. Snack crackers and chips. Cheese crackers, butter crackers, and biscuits. Frozen waffles, sweet breads, doughnuts, pastries, packaged baking mixes, pancakes, cakes, and cookies.  Vegetables   Regular canned or frozen vegetables or vegetables prepared with salt. Canned tomatoes. Canned tomato sauce. Fried vegetables. Vegetables in cream sauce or cheese sauce.  Fruits   Fruits packed in syrup or made with added sugar.  Meats and Other Protein Sources   Marbled or fatty meats such as ribs. Poultry with skin. Fried meats, poultry, eggs, or fish. Sausages, hot  dogs, and deli meats such as pastrami, bologna, or salami.  Dairy   Whole milk, cream, cheeses made from whole milk, sour cream. Ice cream or yogurt made from whole milk or with added sugar.  Beverages   For adults, no more than one alcoholic drink per day. Regular soft drinks or other sugary beverages. Juice drinks.  Sweets and Desserts   Sugary or fatty desserts, candy, and other sweets.  Fats and Oils   Solid shortening or partially hydrogenated oils. Solid margarine. Margarine that contains trans fats. Butter.  The items listed above may not be a complete list of foods and beverages to avoid. Contact your dietitian for more information.   This information is not intended to replace advice given to you by your health care provider. Make sure you discuss any questions you have with your health care provider.  Document Released: 01/06/2009 Document Revised: 05/25/2017 Document Reviewed: 11/26/2014  [employer] Interactive Patient Education © 2017 [employer]    Palpitations  A palpitation is the feeling that your heartbeat is irregular or is faster than normal. It may feel like your heart is fluttering or skipping a beat. Palpitations are usually not a serious problem. They may be caused by many things, including smoking, caffeine, alcohol, stress, and certain medicines. Although most causes of palpitations are not serious, palpitations can be a sign of a serious medical problem. In some cases, you may need further medical evaluation.  Follow these instructions at home:  Pay attention to any changes in your symptoms. Take these actions to help with your condition:  · Avoid the following:  ¨ Caffeinated coffee, tea, soft drinks, diet pills, and energy drinks.  ¨ Chocolate.  ¨ Alcohol.  · Do not use any tobacco products, such as cigarettes, chewing tobacco, and e-cigarettes. If you need help quitting, ask your health care provider.  · Try to reduce your stress and anxiety. Things that can help you relax  include:  ¨ Yoga.  ¨ Meditation.  ¨ Physical activity, such as swimming, jogging, or walking.  ¨ Biofeedback. This is a method that helps you learn to use your mind to control things in your body, such as your heartbeats.  · Get plenty of rest and sleep.  · Take over-the-counter and prescription medicines only as told by your health care provider.  · Keep all follow-up visits as told by your health care provider. This is important.  Contact a health care provider if:  · You continue to have a fast or irregular heartbeat after 24 hours.  · Your palpitations occur more often.  Get help right away if:  · You have chest pain or shortness of breath.  · You have a severe headache.  · You feel dizzy or you faint.  This information is not intended to replace advice given to you by your health care provider. Make sure you discuss any questions you have with your health care provider.  Document Released: 12/15/2001 Document Revised: 05/22/2017 Document Reviewed: 09/01/2016  [employer] Interactive Patient Education © 2017 [employer] Inc.

## 2018-04-19 NOTE — PROGRESS NOTES
Subjective   Renetta Horner is a 57 y.o. female     Chief Complaint   Patient presents with   • Hypertension     presents as a follow up       HPI    Problem List:    1. Atrial Fibrillation   1.1 CHADS score 2; patient Pradaxa and Tikosyn  1.2 Event Monitor 4/23-5/3/16 - Afib and Afib with RVR  1.3 Ablation with Dr. Rey 1/18/17  1.4 Cardioversion with Dr. Rey 4/28/17  2. Hypertension  2.1 Echo 4/8/15 - EF > 65%; DD II; trace MR  3. Chest Pain   3.1 Stress Test 4/8/15 - no ischemia   4. Diabetes Mellitus II  5. CKD I Dr. Kelly     Patient is a 57-year-old female who presents today for a follow-up.  She denies any chest pain, pressure, dizziness, presyncope, syncope, orthopnea, PND or edema.  She says she will only occasionally have a flip flop in her heart.  She denies any shortness of breath with activity.  Patient states since due to her weight loss she has not had to take the Coreg nor the clonidine.  She says at home her blood pressure rarely ever gets over 130s systolic.  Her and her  counting calories and both losing weight.  Her  did have knee replacement surgery in January and is doing well.  She denies any signs of bleeding or cerebral ischemic events.    Current Outpatient Prescriptions   Medication Sig Dispense Refill   • dabigatran etexilate (PRADAXA) 150 MG capsu Take 1 capsule by mouth 2 (Two) Times a Day. 60 capsule 7   • diltiaZEM CD (CARDIZEM CD) 240 MG 24 hr capsule Take 1 capsule by mouth Daily. 30 capsule 11   • docusate sodium (COLACE) 100 MG capsule Take 200 mg by mouth Daily.     • dofetilide (TIKOSYN) 500 MCG capsule Take 1 capsule by mouth Every 12 (Twelve) Hours. 60 capsule 5   • furosemide (LASIX) 20 MG tablet Take 1 tablet by mouth Daily As Needed (edema). 20 tablet 3   • irbesartan (AVAPRO) 300 MG tablet TAKE ONE TABLET BY MOUTH EVERY NIGHT 30 tablet 5   • metFORMIN (GLUCOPHAGE) 500 MG tablet Take 500 mg by mouth 2 (Two) Times a Day With Meals.     • pantoprazole  (PROTONIX) 40 MG EC tablet Take 1 tablet by mouth Daily. 30 tablet 6     No current facility-administered medications for this visit.        ALLERGIES    Atenolol; Indomethacin; Lisinopril; Penicillins; Other; Sulfa antibiotics; Ultram [tramadol]; Xarelto [rivaroxaban]; and Codeine    Past Medical History:   Diagnosis Date   • Arthritis    • Atrial fibrillation     CHADS-VASC = 3   • Atrial fibrillation 5/9/2016    1. Persistent Atrial Fibrillation s/p ECV X2 with IRAF and failed Flecainide     A. Diagnosed in 2015. CHADS-VASc = 3 (DM, HTN, female)      B. Xarelto & ASA stopped due to hemarthrosis of knee.        C. Restarted on Pradaxa and no major issues 150 mg BID     D. Echo 4/8/15 showed EF 65%, moderate, Grade II diastolic dysfunction, mild SERA (LA 3.9cm), normal valves     E. Myocardial Perfusion study 4/8/15: negative for ischemia, EF 68%     • Chest pain    • Diabetes mellitus     on oral agents, Type 2 , Patient states last a1c was 5.9 --DX'D 5 YEARS AGO, CHECKS BLOOD SUGAR DAILY AVERAGE 115-122   • Dizziness    • Fatigue    • GERD (gastroesophageal reflux disease)    • Headache    • Heart murmur    • Hypertension     CONTROLLED WITH MEDS PER PT    • Kidney stone    • Palpitations    • PONV (postoperative nausea and vomiting)    • Wears dentures     UPPER PLATE    • Wears dentures     upper   • Wears eyeglasses     reading       Social History     Social History   • Marital status:      Spouse name: N/A   • Number of children: N/A   • Years of education: N/A     Occupational History   • Not on file.     Social History Main Topics   • Smoking status: Former Smoker     Types: Cigarettes   • Smokeless tobacco: Never Used      Comment: QUIT 1997   • Alcohol use No   • Drug use: No   • Sexual activity: Defer     Other Topics Concern   • Not on file     Social History Narrative   • No narrative on file       Family History   Problem Relation Age of Onset   • Coronary artery disease Mother    • Breast  "cancer Mother    • Diabetes Mother    • Other Mother      Benign prostatic hypertrophy       Review of Systems   Constitutional: Negative for diaphoresis and fatigue.   HENT: Negative for rhinorrhea, sinus pain, sinus pressure and sneezing.    Eyes: Negative for visual disturbance.   Respiratory: Negative for shortness of breath.    Cardiovascular: Positive for palpitations ( occasionally; flip flop ). Negative for chest pain and leg swelling.   Gastrointestinal: Negative for constipation, diarrhea, nausea and vomiting.   Endocrine: Negative.    Genitourinary: Negative for difficulty urinating.   Musculoskeletal: Negative for arthralgias, back pain, myalgias and neck pain.   Skin: Negative.    Allergic/Immunologic: Positive for environmental allergies.   Neurological: Positive for headaches (little comes in spurts; BP good ). Negative for dizziness, syncope and light-headedness.   Hematological: Does not bruise/bleed easily.   Psychiatric/Behavioral: The patient is not nervous/anxious.        Objective   /71 (BP Location: Left arm, Patient Position: Sitting)   Pulse 67   Ht 167.6 cm (66\")   Wt 120 kg (263 lb 12.8 oz)   LMP  (LMP Unknown)   SpO2 96%   BMI 42.58 kg/m²   Vitals:    04/19/18 1108   BP: 172/71   BP Location: Left arm   Patient Position: Sitting   Pulse: 67   SpO2: 96%   Weight: 120 kg (263 lb 12.8 oz)   Height: 167.6 cm (66\")      Lab Results (most recent)     None        Physical Exam   Constitutional: She is oriented to person, place, and time. Vital signs are normal. She appears well-developed and well-nourished. She is active and cooperative.   HENT:   Head: Normocephalic.   Eyes: Lids are normal.   Neck: Normal carotid pulses, no hepatojugular reflux and no JVD present. Carotid bruit is not present.   Cardiovascular: Normal rate, regular rhythm and normal heart sounds.    Pulses:       Radial pulses are 2+ on the right side, and 2+ on the left side.        Dorsalis pedis pulses are 2+ on " the right side, and 2+ on the left side.        Posterior tibial pulses are 2+ on the right side, and 2+ on the left side.   No edema BLE.    Pulmonary/Chest: Effort normal and breath sounds normal.   Abdominal: Normal appearance and bowel sounds are normal.   Neurological: She is alert and oriented to person, place, and time.   Skin: Skin is warm, dry and intact.   Psychiatric: She has a normal mood and affect. Her speech is normal and behavior is normal. Judgment and thought content normal. Cognition and memory are normal.       Procedure   Procedures         Assessment/Plan      Diagnosis Plan   1. Essential hypertension     2. Persistent atrial fibrillation     3. Palpitations     4. Paroxysmal atrial fibrillation  diltiaZEM CD (CARDIZEM CD) 240 MG 24 hr capsule   5. Peripheral edema  diltiaZEM CD (CARDIZEM CD) 240 MG 24 hr capsule       Return in about 6 months (around 10/19/2018).       Hypertension-doing well.  Persistent A. fib-patient is on diltiazem, Tikosyn and Pradaxa.  Peripheral edema-patient doing well.  She will continue her medication regimen.  She will follow-up in 6 months or sooner if any changes.      Patient's Body mass index is 42.58 kg/m². BMI is above normal parameters. Follow-up plan includes:  educational material.      Electronically signed by:

## 2018-06-27 RX ORDER — PANTOPRAZOLE SODIUM 40 MG/1
TABLET, DELAYED RELEASE ORAL
Qty: 30 TABLET | Refills: 6 | Status: SHIPPED | OUTPATIENT
Start: 2018-06-27 | End: 2019-01-28 | Stop reason: SDUPTHER

## 2018-07-03 DIAGNOSIS — I48.19 PERSISTENT ATRIAL FIBRILLATION (HCC): ICD-10-CM

## 2018-07-05 DIAGNOSIS — I48.19 PERSISTENT ATRIAL FIBRILLATION (HCC): ICD-10-CM

## 2018-07-05 RX ORDER — DOFETILIDE 0.5 MG/1
CAPSULE ORAL
Qty: 60 CAPSULE | Refills: 5 | Status: SHIPPED | OUTPATIENT
Start: 2018-07-05 | End: 2018-07-05 | Stop reason: SDUPTHER

## 2018-07-05 RX ORDER — DOFETILIDE 0.5 MG/1
500 CAPSULE ORAL EVERY 12 HOURS
Qty: 60 CAPSULE | Refills: 5 | Status: SHIPPED | OUTPATIENT
Start: 2018-07-05 | End: 2018-11-08 | Stop reason: SDUPTHER

## 2018-07-05 NOTE — TELEPHONE ENCOUNTER
Patient called requesting a refill on dofetilide   I will send this to [facility] in [facility]

## 2018-07-06 ENCOUNTER — TELEPHONE (OUTPATIENT)
Dept: CARDIOLOGY | Facility: CLINIC | Age: 58
End: 2018-07-06

## 2018-07-06 ENCOUNTER — OFFICE VISIT (OUTPATIENT)
Dept: CARDIOLOGY | Facility: CLINIC | Age: 58
End: 2018-07-06

## 2018-07-06 VITALS
SYSTOLIC BLOOD PRESSURE: 114 MMHG | WEIGHT: 249 LBS | BODY MASS INDEX: 41.48 KG/M2 | DIASTOLIC BLOOD PRESSURE: 74 MMHG | HEIGHT: 65 IN | HEART RATE: 61 BPM

## 2018-07-06 DIAGNOSIS — I48.19 PERSISTENT ATRIAL FIBRILLATION (HCC): Primary | ICD-10-CM

## 2018-07-06 PROCEDURE — 99213 OFFICE O/P EST LOW 20 MIN: CPT | Performed by: INTERNAL MEDICINE

## 2018-07-06 PROCEDURE — 93000 ELECTROCARDIOGRAM COMPLETE: CPT | Performed by: INTERNAL MEDICINE

## 2018-07-06 RX ORDER — ERGOCALCIFEROL 1.25 MG/1
50000 CAPSULE ORAL WEEKLY
COMMUNITY

## 2018-07-06 NOTE — TELEPHONE ENCOUNTER
Per providers in our office that Dr. Rey initiated the medication and will needs to be PA'd by his office. Patient verbalized understanding.       ----- Message from Diya Alves sent at 7/6/2018  8:57 AM EDT -----  Contact: PATIENT  THE PATIENT CALLED AND STATES HER INSURANCE IS NOT COVERING THE DOFETLIDE 500MG.  SHE STATES THE PHARMACY NEEDS A PA SHE ONLY HAS 3 PILLS LEFT AND NEEDS TO KNOW WHAT SHE NEEDS TO DO.  SHE CAN BE REACHED [phone].  THANKS

## 2018-07-06 NOTE — PROGRESS NOTES
Subjective:   Renetta Horner  1960  [phone]      01/05/2018    [facility] CARDIOLOGY    Gustabo Quach MD  [address] [address] [place], [place] [id number]    REFERRING DOCTOR: Dr Seals      Patient ID: Renetta Horner is a 57 y.o. female.    Chief Complaint:   Chief Complaint   Patient presents with   • Atrial Fibrillation     Problem List:  1. Persistent Atrial Fibrillation s/p ECV X2 with IRAF and failed Flecainide  A. Diagnosed in 2015. CHADS-VASc = 2   B. No a/c secondary to hemarthrosis on Xarelto.   C. Echo show EF of 65%. Stress test negative for ischemia.   D. Restarted on Pradaxa and no major issues 150 mg BID  E. Pulmonary vein ablation/roof line/box in lesion along the posterior wall on 01/18/2017  F. Recurrent Afib now on Tikosyn 500 mcg BID    2. Labile HTN  Cant take Norvasc due to side effects and also micardis in the past but insurance didn't cover.  Has had workup for secondary causes of HTN per the patient and family and all normal.     Allergies   Allergen Reactions   • Atenolol Shortness Of Breath     Hard to breath   • Indomethacin Shortness Of Breath     , HIVES    • Lisinopril Shortness Of Breath   • Penicillins Anaphylaxis   • Other Itching     [place]-- hives and vomiting   • Sulfa Antibiotics Nausea And Vomiting   • Ultram [Tramadol] Nausea And Vomiting   • Xarelto [Rivaroxaban]      Bleeding knee   • Codeine Nausea Only     ITCHING    Norvasc causes swelling       Current Outpatient Prescriptions:   •  CloNIDine (CATAPRES) 0.1 MG tablet, TAKE ONE TABLET BY MOUTH THREE TIMES DAILY AS NEEDED FOR HIGH BLOOD PRESSURE (SBP >160 OR DBP >90) (Patient taking differently: takes bid. 3rd. dose prn), Disp: 30 tablet, Rfl: 3  •  dabigatran etexilate (PRADAXA) 150 MG capsu, Take 1 capsule by mouth 2 (Two) Times a Day., Disp: 60 capsule, Rfl: 7  •  diltiaZEM CD (CARDIZEM CD) 240 MG 24 hr capsule, Take 1 capsule by mouth Daily., Disp: 30 capsule, Rfl: 5  •   docusate sodium (COLACE) 100 MG capsule, Take 200 mg by mouth Daily., Disp: , Rfl:   •  dofetilide (TIKOSYN) 500 MCG capsule, Take 1 capsule by mouth Every 12 (Twelve) Hours., Disp: 60 capsule, Rfl: 5  •  doxycycline (VIBRAMYCIN) 100 MG capsule, Take 100 mg by mouth 2 (Two) Times a Day., Disp: , Rfl:   •  furosemide (LASIX) 20 MG tablet, Take 1 tablet by mouth Daily As Needed (edema)., Disp: 20 tablet, Rfl: 3  •  irbesartan (AVAPRO) 300 MG tablet, Take 1 tablet by mouth Every Night., Disp: 30 tablet, Rfl: 5  •  metFORMIN (GLUCOPHAGE) 500 MG tablet, Take 500 mg by mouth 2 (Two) Times a Day With Meals., Disp: , Rfl:   •  pantoprazole (PROTONIX) 40 MG EC tablet, Take 1 tablet by mouth Daily., Disp: 30 tablet, Rfl: 6  •  vitamin D (ERGOCALCIFEROL) 74300 units capsule capsule, 1 (One) Time Per Week., Disp: , Rfl:   •  coenzyme Q10 100 MG capsule, Take 50 mg by mouth Daily., Disp: , Rfl:   •  hydrOXYzine (ATARAX) 25 MG tablet, Take 1 tablet by mouth 3 (Three) Times a Day As Needed for Itching., Disp: 30 tablet, Rfl: 1      History of Present Illness  The patient is a 57 year old female with history Persistent Afib s/p PVA in Jan 2017 with recurrence of Afib now controlled with Tikosyn here today for follow up visit.   No sig issues with Afib at this time and BP better controlled now even without new meds, more just weight loss. Lost about 38 lbs with diet and walking. Overall doing better.     No issues with chest pain, shortness of breath, fevers, chills, night sweats, PND, orthopnea or palpitations. No recent ER visits or hospital stays.      The following portions of the patient's history were reviewed and updated as appropriate: allergies, current medications, past family history, past medical history, past social history, past surgical history and problem list.    ROS   14 point ROS negative except as outlined in problem list, HPI and other parts of the note.      ECG 12 Lead  Date/Time: 7/6/2018 11:34 AM  Performed  "by: COLBY SO  Authorized by: COLBY SO   Rhythm: sinus rhythm  Conduction: 1st degree  Comments: QTc ok               Objective:       Vitals:    07/06/18 1126   BP: 114/74   BP Location: Left arm   Patient Position: Sitting   Pulse: 61   Weight: 113 kg (249 lb)   Height: 165.1 cm (65\")       GENERAL: Well-developed, well-nourished patient in no acute distress.  HEENT: Normocephalic, atraumatic, PERRLA. Moist mucous membranes.  NECK: No JVD present at 30°. No carotid bruits auscultated.  LUNGS: Clear to auscultation.  CARDIOVASCULAR: Heart has a regular rate and rhythm. No murmurs, gallops or rubs noted.   ABDOMEN: Soft, nontender. Positive bowel sounds.  MUSCULOSKELETAL: No gross deformities. No clubbing, cyanosis, or lower extremity edema.  SKIN: Pink, warm  Neuro: Nonfocal exam. Gait intact  Ext: No edema or bruising    The patient's old records including ambulatory rhythm recordings (ECGs, Holter/event monitor) were reviewed and discussed.      Lab Review:   Results for orders placed or performed during the hospital encounter of 06/07/17   Basic Metabolic Panel   Result Value Ref Range    Glucose 118 (H) 70 - 100 mg/dL    BUN 13 9 - 23 mg/dL    Creatinine 0.60 0.60 - 1.30 mg/dL    Sodium 136 132 - 146 mmol/L    Potassium 4.2 3.5 - 5.5 mmol/L    Chloride 103 99 - 109 mmol/L    CO2 26.0 20.0 - 31.0 mmol/L    Calcium 9.6 8.7 - 10.4 mg/dL    eGFR Non African Amer 103 >60 mL/min/1.73    BUN/Creatinine Ratio 21.7 7.0 - 25.0    Anion Gap 7.0 3.0 - 11.0 mmol/L   Magnesium   Result Value Ref Range    Magnesium 1.9 1.3 - 2.7 mg/dL   Magnesium   Result Value Ref Range    Magnesium 2.2 1.3 - 2.7 mg/dL   POC Glucose Fingerstick   Result Value Ref Range    Glucose 88 70 - 130 mg/dL   POC Glucose Fingerstick   Result Value Ref Range    Glucose 123 70 - 130 mg/dL   POC Glucose Fingerstick   Result Value Ref Range    Glucose 134 (H) 70 - 130 mg/dL   POC Glucose Fingerstick   Result Value Ref Range    Glucose 129 70 " - 130 mg/dL   POC Glucose Fingerstick   Result Value Ref Range    Glucose 118 70 - 130 mg/dL   POC Glucose Fingerstick   Result Value Ref Range    Glucose 122 70 - 130 mg/dL   POC Glucose Fingerstick   Result Value Ref Range    Glucose 133 (H) 70 - 130 mg/dL   POC Glucose Fingerstick   Result Value Ref Range    Glucose 129 70 - 130 mg/dL   POC Glucose Fingerstick   Result Value Ref Range    Glucose 97 70 - 130 mg/dL           Diagnosis:   1. Persistent atrial fibrillation  2. Obesity, Class III, BMI 40-49.9 (morbid obesity)  3. Essential hypertension      Assessment & Plan:   1. Persistent Afib s/p PVA in Jan 2017 with recurrences of Afib and now controlled on Tikosyn 500 mcg BID, Cardizem and Pradaxa. Continue on current medical Rx. QTc ok today  2. Labile HTN now improved with weight loss and continuing on current medical Rx  3. Follow up with me in 6 mths         CC: Dr Mt Rey, DO  07/06/18  11:32 AM      EMR Dragon/Transcription disclaimer:  Much of this encounter note is an electronic transcription/translation of spoken language to printed text. Electronic translation of spoken language may permit erroneous, or at times, nonsensical words or phrases to be inadvertently transcribed. Although I have reviewed the note for such errors, some may still exist.

## 2018-09-29 DIAGNOSIS — I10 ESSENTIAL HYPERTENSION: ICD-10-CM

## 2018-10-01 RX ORDER — IRBESARTAN 300 MG/1
TABLET ORAL
Qty: 30 TABLET | Refills: 5 | Status: SHIPPED | OUTPATIENT
Start: 2018-10-01 | End: 2019-03-24 | Stop reason: SDUPTHER

## 2018-10-01 NOTE — TELEPHONE ENCOUNTER
Received fax from [facility] in [place], requesting clarification on pt's irbesartan (AVAPRO) 300 MG tablet. The fax doesn't state what they need clarified.     Will call px and inquire what they are needing clarified.

## 2018-10-01 NOTE — TELEPHONE ENCOUNTER
Spoke w/ Tonya at [facility], she stated that rx is ready for P/U and she's not sure who was asking for clarification or why. I told her to call our office or fax us, if they need anything further.

## 2018-11-08 ENCOUNTER — OFFICE VISIT (OUTPATIENT)
Dept: CARDIOLOGY | Facility: CLINIC | Age: 58
End: 2018-11-08

## 2018-11-08 VITALS
OXYGEN SATURATION: 100 % | HEIGHT: 65 IN | WEIGHT: 241.4 LBS | DIASTOLIC BLOOD PRESSURE: 70 MMHG | HEART RATE: 69 BPM | SYSTOLIC BLOOD PRESSURE: 122 MMHG | BODY MASS INDEX: 40.22 KG/M2

## 2018-11-08 DIAGNOSIS — R00.2 PALPITATIONS: ICD-10-CM

## 2018-11-08 DIAGNOSIS — I48.19 PERSISTENT ATRIAL FIBRILLATION (HCC): ICD-10-CM

## 2018-11-08 DIAGNOSIS — I10 ESSENTIAL HYPERTENSION: Primary | ICD-10-CM

## 2018-11-08 PROCEDURE — 99213 OFFICE O/P EST LOW 20 MIN: CPT | Performed by: NURSE PRACTITIONER

## 2018-11-08 RX ORDER — DOFETILIDE 0.5 MG/1
500 CAPSULE ORAL EVERY 12 HOURS
Qty: 60 CAPSULE | Refills: 11 | Status: SHIPPED | OUTPATIENT
Start: 2018-11-08 | End: 2019-11-29 | Stop reason: SDUPTHER

## 2018-11-08 NOTE — PATIENT INSTRUCTIONS
"Fat and Cholesterol Restricted Diet  Getting too much fat and cholesterol in your diet may cause health problems. Following this diet helps keep your fat and cholesterol at normal levels. This can keep you from getting sick.  What types of fat should I choose?  · Choose monosaturated and polyunsaturated fats. These are found in foods such as olive oil, canola oil, flaxseeds, walnuts, almonds, and seeds.  · Eat more omega-3 fats. Good choices include salmon, mackerel, sardines, tuna, flaxseed oil, and ground flaxseeds.  · Limit saturated fats. These are in animal products such as meats, butter, and cream. They can also be in plant products such as palm oil, palm kernel oil, and coconut oil.  · Avoid foods with partially hydrogenated oils in them. These contain trans fats. Examples of foods that have trans fats are stick margarine, some tub margarines, cookies, crackers, and other baked goods.  What general guidelines do I need to follow?  · Check food labels. Look for the words \"trans fat\" and \"saturated fat.\"  · When preparing a meal:  ? Fill half of your plate with vegetables and green salads.  ? Fill one fourth of your plate with whole grains. Look for the word \"whole\" as the first word in the ingredient list.  ? Fill one fourth of your plate with lean protein foods.  · Eat more foods that have fiber, like apples, carrots, beans, peas, and barley.  · Eat more home-cooked foods. Eat less at restaurants and buffets.  · Limit or avoid alcohol.  · Limit foods high in starch and sugar.  · Limit fried foods.  · Cook foods without frying them. Baking, boiling, grilling, and broiling are all great options.  · Lose weight if you are overweight. Losing even a small amount of weight can help your overall health. It can also help prevent diseases such as diabetes and heart disease.  What foods can I eat?  Grains  Whole grains, such as whole wheat or whole grain breads, crackers, cereals, and pasta. Unsweetened oatmeal, " bulgur, barley, quinoa, or brown rice. Corn or whole wheat flour tortillas.  Vegetables  Fresh or frozen vegetables (raw, steamed, roasted, or grilled). Green salads.  Fruits  All fresh, canned (in natural juice), or frozen fruits.  Meat and Other Protein Products  Ground beef (85% or leaner), grass-fed beef, or beef trimmed of fat. Skinless chicken or turkey. Ground chicken or turkey. Pork trimmed of fat. All fish and seafood. Eggs. Dried beans, peas, or lentils. Unsalted nuts or seeds. Unsalted canned or dry beans.  Dairy  Low-fat dairy products, such as skim or 1% milk, 2% or reduced-fat cheeses, low-fat ricotta or cottage cheese, or plain low-fat yogurt.  Fats and Oils  Tub margarines without trans fats. Light or reduced-fat mayonnaise and salad dressings. Avocado. Olive, canola, sesame, or safflower oils. Natural peanut or almond butter (choose ones without added sugar and oil).  The items listed above may not be a complete list of recommended foods or beverages. Contact your dietitian for more options.  What foods are not recommended?  Grains  White bread. White pasta. White rice. Cornbread. Bagels, pastries, and croissants. Crackers that contain trans fat.  Vegetables  White potatoes. Corn. Creamed or fried vegetables. Vegetables in a cheese sauce.  Fruits  Dried fruits. Canned fruit in light or heavy syrup. Fruit juice.  Meat and Other Protein Products  Fatty cuts of meat. Ribs, chicken wings, merritt, sausage, bologna, salami, chitterlings, fatback, hot dogs, bratwurst, and packaged luncheon meats. Liver and organ meats.  Dairy  Whole or 2% milk, cream, half-and-half, and cream cheese. Whole milk cheeses. Whole-fat or sweetened yogurt. Full-fat cheeses. Nondairy creamers and whipped toppings. Processed cheese, cheese spreads, or cheese curds.  Sweets and Desserts  Corn syrup, sugars, honey, and molasses. Candy. Jam and jelly. Syrup. Sweetened cereals. Cookies, pies, cakes, donuts, muffins, and ice  cream.  Fats and Oils  Butter, stick margarine, lard, shortening, ghee, or merritt fat. Coconut, palm kernel, or palm oils.  Beverages  Alcohol. Sweetened drinks (such as sodas, lemonade, and fruit drinks or punches).  The items listed above may not be a complete list of foods and beverages to avoid. Contact your dietitian for more information.  This information is not intended to replace advice given to you by your health care provider. Make sure you discuss any questions you have with your health care provider.  Document Released: 06/18/2013 Document Revised: 08/24/2017 Document Reviewed: 03/19/2015  [employer] Interactive Patient Education © 2018 [employer]  BMI for Adults  Body mass index (BMI) is a number that is calculated from a person's weight and height. In most adults, the number is used to find how much of an adult's weight is made up of fat. BMI is not as accurate as a direct measure of body fat.  How is BMI calculated?  BMI is calculated by dividing weight in kilograms by height in meters squared. It can also be calculated by dividing weight in pounds by height in inches squared, then multiplying the resulting number by 703. Charts are available to help you find your BMI quickly and easily without doing this calculation.  How is BMI interpreted?  Health care professionals use BMI charts to identify whether an adult is underweight, at a normal weight, or overweight based on the following guidelines:  · Underweight: BMI less than 18.5.  · Normal weight: BMI between 18.5 and 24.9.  · Overweight: BMI between 25 and 29.9.  · Obese: BMI of 30 and above.    BMI is usually interpreted the same for males and females.  Weight includes both fat and muscle, so someone with a muscular build, such as an athlete, may have a BMI that is higher than 24.9. In cases like these, BMI may not accurately depict body fat. To determine if excess body fat is the cause of a BMI of 25 or higher, further assessments may need to be  done by a health care provider.  Why is BMI a useful tool?  BMI is used to identify a possible weight problem that may be related to a medical problem or may increase the risk for medical problems. BMI can also be used to promote changes to reach a healthy weight.  This information is not intended to replace advice given to you by your health care provider. Make sure you discuss any questions you have with your health care provider.  Document Released: 08/29/2005 Document Revised: 04/27/2017 Document Reviewed: 05/15/2015  [employer] Interactive Patient Education © 2018 [employer]  Atrial Fibrillation  Atrial fibrillation is a type of irregular or rapid heartbeat (arrhythmia). In atrial fibrillation, the heart quivers continuously in a chaotic pattern. This occurs when parts of the heart receive disorganized signals that make the heart unable to pump blood normally. This can increase the risk for stroke, heart failure, and other heart-related conditions. There are different types of atrial fibrillation, including:  · Paroxysmal atrial fibrillation. This type starts suddenly, and it usually stops on its own shortly after it starts.  · Persistent atrial fibrillation. This type often lasts longer than a week. It may stop on its own or with treatment.  · Long-lasting persistent atrial fibrillation. This type lasts longer than 12 months.  · Permanent atrial fibrillation. This type does not go away.    Talk with your health care provider to learn about the type of atrial fibrillation that you have.  What are the causes?  This condition is caused by some heart-related conditions or procedures, including:  · A heart attack.  · Coronary artery disease.  · Heart failure.  · Heart valve conditions.  · High blood pressure.  · Inflammation of the sac that surrounds the heart (pericarditis).  · Heart surgery.  · Certain heart rhythm disorders, such as Flynn-Parkinson-White syndrome.    Other causes  include:  · Pneumonia.  · Obstructive sleep apnea.  · Blockage of an artery in the lungs (pulmonary embolism, or PE).  · Lung cancer.  · Chronic lung disease.  · Thyroid problems, especially if the thyroid is overactive (hyperthyroidism).  · Caffeine.  · Excessive alcohol use or illegal drug use.  · Use of some medicines, including certain decongestants and diet pills.    Sometimes, the cause cannot be found.  What increases the risk?  This condition is more likely to develop in:  · People who are older in age.  · People who smoke.  · People who have diabetes mellitus.  · People who are overweight (obese).  · Athletes who exercise vigorously.    What are the signs or symptoms?  Symptoms of this condition include:  · A feeling that your heart is beating rapidly or irregularly.  · A feeling of discomfort or pain in your chest.  · Shortness of breath.  · Sudden light-headedness or weakness.  · Getting tired easily during exercise.    In some cases, there are no symptoms.  How is this diagnosed?  Your health care provider may be able to detect atrial fibrillation when taking your pulse. If detected, this condition may be diagnosed with:  · An electrocardiogram (ECG).  · A Holter monitor test that records your heartbeat patterns over a 24-hour period.  · Transthoracic echocardiogram (TTE) to evaluate how blood flows through your heart.  · Transesophageal echocardiogram (YARON) to view more detailed images of your heart.  · A stress test.  · Imaging tests, such as a CT scan or chest X-ray.  · Blood tests.    How is this treated?  The main goals of treatment are to prevent blood clots from forming and to keep your heart beating at a normal rate and rhythm. The type of treatment that you receive depends on many factors, such as your underlying medical conditions and how you feel when you are experiencing atrial fibrillation.  This condition may be treated with:  · Medicine to slow down the heart rate, bring the heart’s rhythm  back to normal, or prevent clots from forming.  · Electrical cardioversion. This is a procedure that resets your heart’s rhythm by delivering a controlled, low-energy shock to the heart through your skin.  · Different types of ablation, such as catheter ablation, catheter ablation with pacemaker, or surgical ablation. These procedures destroy the heart tissues that send abnormal signals. When the pacemaker is used, it is placed under your skin to help your heart beat in a regular rhythm.    Follow these instructions at home:  · Take over-the counter and prescription medicines only as told by your health care provider.  · If your health care provider prescribed a blood-thinning medicine (anticoagulant), take it exactly as told. Taking too much blood-thinning medicine can cause bleeding. If you do not take enough blood-thinning medicine, you will not have the protection that you need against stroke and other problems.  · Do not use tobacco products, including cigarettes, chewing tobacco, and e-cigarettes. If you need help quitting, ask your health care provider.  · If you have obstructive sleep apnea, manage your condition as told by your health care provider.  · Do not drink alcohol.  · Do not drink beverages that contain caffeine, such as coffee, soda, and tea.  · Maintain a healthy weight. Do not use diet pills unless your health care provider approves. Diet pills may make heart problems worse.  · Follow diet instructions as told by your health care provider.  · Exercise regularly as told by your health care provider.  · Keep all follow-up visits as told by your health care provider. This is important.  How is this prevented?  · Avoid drinking beverages that contain caffeine or alcohol.  · Avoid certain medicines, especially medicines that are used for breathing problems.  · Avoid certain herbs and herbal medicines, such as those that contain ephedra or ginseng.  · Do not use illegal drugs, such as cocaine and  amphetamines.  · Do not smoke.  · Manage your high blood pressure.  Contact a health care provider if:  · You notice a change in the rate, rhythm, or strength of your heartbeat.  · You are taking an anticoagulant and you notice increased bruising.  · You tire more easily when you exercise or exert yourself.  Get help right away if:  · You have chest pain, abdominal pain, sweating, or weakness.  · You feel nauseous.  · You notice blood in your vomit, bowel movement, or urine.  · You have shortness of breath.  · You suddenly have swollen feet and ankles.  · You feel dizzy.  · You have sudden weakness or numbness of the face, arm, or leg, especially on one side of the body.  · You have trouble speaking, trouble understanding, or both (aphasia).  · Your face or your eyelid droops on one side.  These symptoms may represent a serious problem that is an emergency. Do not wait to see if the symptoms will go away. Get medical help right away. Call your local emergency services (911 in the [place] Do not drive yourself to the hospital.  This information is not intended to replace advice given to you by your health care provider. Make sure you discuss any questions you have with your health care provider.  Document Released: 12/18/2006 Document Revised: 04/26/2017 Document Reviewed: 04/13/2016  [employer] Interactive Patient Education © 2018 [employer]

## 2018-11-08 NOTE — PROGRESS NOTES
Subjective   Renetta Horner is a 58 y.o. female     Chief Complaint   Patient presents with   • Follow-up     Pt in office for 6 month follow up   • Hypertension       HPI    Problem List:    1. Atrial Fibrillation   1.1 CHADS score 2; patient Pradaxa and Tikosyn  1.2 Event Monitor 4/23-5/3/16 - Afib and Afib with RVR  1.3 Ablation with Dr. Rey 1/18/17  1.4 Cardioversion with Dr. Rey 4/28/17  2. Hypertension  2.1 Echo 4/8/15 - EF > 65%; DD II; trace MR  3. Chest Pain   3.1 Stress Test 4/8/15 - no ischemia   4. Diabetes Mellitus II  5. CKD I Dr. Kelly     Patient is a 58-year-old female who presents today for a follow-up with her  at her side.  She denies any chest pain or pressure.  She says she very seldomly at night will fill little bit of a palpitation.  She denies any dizziness, presyncope, syncope, orthopnea, PND or edema.  She denies any shortness of breath with activity.  She's been walking about a mile a day and doing very well.  She denies any signs of bleeding or cerebral ischemic events.  PCP monitors her cholesterol.  Patient has done an excellent job with weight loss.     Current Outpatient Prescriptions   Medication Sig Dispense Refill   • dabigatran etexilate (PRADAXA) 150 MG capsu Take 1 capsule by mouth 2 (Two) Times a Day. 60 capsule 7   • diltiaZEM CD (CARDIZEM CD) 240 MG 24 hr capsule Take 1 capsule by mouth Daily. 30 capsule 11   • docusate sodium (COLACE) 100 MG capsule Take 200 mg by mouth Daily.     • dofetilide (TIKOSYN) 500 MCG capsule Take 1 capsule by mouth Every 12 (Twelve) Hours. 60 capsule 11   • furosemide (LASIX) 20 MG tablet Take 1 tablet by mouth Daily As Needed (edema). 20 tablet 3   • irbesartan (AVAPRO) 300 MG tablet TAKE ONE TABLET BY MOUTH EVERY NIGHT 30 tablet 5   • metFORMIN (GLUCOPHAGE) 500 MG tablet Take 500 mg by mouth Daily With Breakfast.     • pantoprazole (PROTONIX) 40 MG EC tablet TAKE ONE TABLET BY MOUTH ONCE DAILY 30 tablet 6   • vitamin D  (ERGOCALCIFEROL) 91067 units capsule capsule Take 50,000 Units by mouth 1 (One) Time Per Week.       No current facility-administered medications for this visit.        ALLERGIES    Atenolol; Indomethacin; Lisinopril; Penicillins; Other; Sulfa antibiotics; Ultram [tramadol]; Xarelto [rivaroxaban]; and Codeine    Past Medical History:   Diagnosis Date   • Arthritis    • Atrial fibrillation (CMS/HCC)     CHADS-VASC = 3   • Atrial fibrillation (CMS/HCC) 5/9/2016    1. Persistent Atrial Fibrillation s/p ECV X2 with IRAF and failed Flecainide     A. Diagnosed in 2015. CHADS-VASc = 3 (DM, HTN, female)      B. Xarelto & ASA stopped due to hemarthrosis of knee.        C. Restarted on Pradaxa and no major issues 150 mg BID     D. Echo 4/8/15 showed EF 65%, moderate, Grade II diastolic dysfunction, mild SERA (LA 3.9cm), normal valves     E. Myocardial Perfusion study 4/8/15: negative for ischemia, EF 68%     • Chest pain    • Diabetes mellitus (CMS/HCC)     on oral agents, Type 2 , Patient states last a1c was 5.9 --DX'D 5 YEARS AGO, CHECKS BLOOD SUGAR DAILY AVERAGE 115-122   • Dizziness    • Fatigue    • GERD (gastroesophageal reflux disease)    • Headache    • Heart murmur    • Hypertension     CONTROLLED WITH MEDS PER PT    • Kidney stone    • Palpitations    • PONV (postoperative nausea and vomiting)    • Wears dentures     UPPER PLATE    • Wears dentures     upper   • Wears eyeglasses     reading       Social History     Social History   • Marital status:      Spouse name: N/A   • Number of children: N/A   • Years of education: N/A     Occupational History   • Not on file.     Social History Main Topics   • Smoking status: Former Smoker     Types: Cigarettes   • Smokeless tobacco: Never Used      Comment: QUIT 1997   • Alcohol use No   • Drug use: No   • Sexual activity: Defer     Other Topics Concern   • Not on file     Social History Narrative   • No narrative on file       Family History   Problem Relation Age of  "Onset   • Coronary artery disease Mother    • Breast cancer Mother    • Diabetes Mother    • Other Mother         Benign prostatic hypertrophy       Review of Systems   Constitutional: Negative for diaphoresis and fatigue.   HENT: Negative for congestion, rhinorrhea and sore throat.    Eyes: Positive for visual disturbance (Reading glasses ).   Respiratory: Negative for chest tightness and shortness of breath.    Cardiovascular: Positive for palpitations (very seldom a little at night ). Negative for chest pain and leg swelling.   Gastrointestinal: Negative for abdominal pain, blood in stool, constipation, diarrhea, nausea and vomiting.   Endocrine: Positive for heat intolerance (\"always hot\"). Negative for cold intolerance.   Genitourinary: Negative for difficulty urinating, dysuria, frequency, hematuria and urgency.   Musculoskeletal: Positive for arthralgias. Negative for back pain and neck pain.   Skin: Negative for rash and wound.   Allergic/Immunologic: Positive for food allergies ([place]). Negative for environmental allergies.   Neurological: Negative for dizziness, syncope, weakness, light-headedness, numbness and headaches.   Hematological: Does not bruise/bleed easily.   Psychiatric/Behavioral: Negative for sleep disturbance.       Objective   /70 (BP Location: Left arm, Patient Position: Sitting)   Pulse 69   Ht 165.1 cm (65\")   Wt 109 kg (241 lb 6.4 oz)   LMP  (LMP Unknown)   SpO2 100%   BMI 40.17 kg/m²   Vitals:    11/08/18 1348 11/08/18 1404   BP: (!) 186/76 122/70   BP Location:  Left arm   Patient Position:  Sitting   Pulse: 69    SpO2: 100%    Weight: 109 kg (241 lb 6.4 oz)    Height: 165.1 cm (65\")       Lab Results (most recent)     None        Physical Exam   Constitutional: She is oriented to person, place, and time. Vital signs are normal. She appears well-developed and well-nourished. She is active and cooperative.   HENT:   Head: Normocephalic.   Eyes: Lids are normal.   Neck: " Normal carotid pulses, no hepatojugular reflux and no JVD present. Carotid bruit is not present.   Cardiovascular: Normal rate, regular rhythm and normal heart sounds.    Pulses:       Radial pulses are 2+ on the right side, and 2+ on the left side.        Dorsalis pedis pulses are 2+ on the right side, and 2+ on the left side.        Posterior tibial pulses are 2+ on the right side, and 2+ on the left side.   No edema BLE.   Pulmonary/Chest: Effort normal and breath sounds normal.   Abdominal: Normal appearance and bowel sounds are normal.   Neurological: She is alert and oriented to person, place, and time.   Skin: Skin is warm, dry and intact.   Psychiatric: She has a normal mood and affect. Her speech is normal and behavior is normal. Judgment and thought content normal. Cognition and memory are normal.       Procedure   Procedures         Assessment/Plan      Diagnosis Plan   1. Essential hypertension     2. Persistent atrial fibrillation (CMS/HCC)  dofetilide (TIKOSYN) 500 MCG capsule   3. Palpitations         Return in about 6 months (around 5/8/2019).    Hypertension-patient doing very well.  Persistent A. fib last palpitations-patient is on diltiazem, pradaxa and tikosyn.  She will continue her medication regimen.  She'll follow-up in 6 months or sooner if any changes.         Patient's Body mass index is 40.17 kg/m². BMI is above normal parameters. Recommendations include: educational material.      Electronically signed by:

## 2018-12-06 ENCOUNTER — TELEPHONE (OUTPATIENT)
Dept: CARDIOLOGY | Facility: CLINIC | Age: 58
End: 2018-12-06

## 2018-12-06 DIAGNOSIS — I48.0 PAROXYSMAL ATRIAL FIBRILLATION (HCC): ICD-10-CM

## 2018-12-06 RX ORDER — ATENOLOL 100 MG/1
TABLET ORAL
Qty: 60 CAPSULE | Refills: 7 | Status: SHIPPED | OUTPATIENT
Start: 2018-12-06 | End: 2019-08-01 | Stop reason: SDUPTHER

## 2018-12-06 NOTE — TELEPHONE ENCOUNTER
----- Message from Amaury Osman Rep sent at 12/6/2018  1:55 PM EST -----  PT NEEDS REFILL ON PRADAXA 150 MG capsule to [facility] in [place]

## 2019-01-26 RX ORDER — PANTOPRAZOLE SODIUM 40 MG/1
TABLET, DELAYED RELEASE ORAL
Qty: 30 TABLET | Refills: 6 | Status: CANCELLED | OUTPATIENT
Start: 2019-01-26

## 2019-01-28 RX ORDER — PANTOPRAZOLE SODIUM 40 MG/1
40 TABLET, DELAYED RELEASE ORAL DAILY
Qty: 90 TABLET | Refills: 5 | Status: SHIPPED | OUTPATIENT
Start: 2019-01-28 | End: 2019-04-30 | Stop reason: ALTCHOICE

## 2019-01-28 NOTE — TELEPHONE ENCOUNTER
Pt LVM requesting Rf of Protonix be sent to [facility] in [place].       Sent rx to px, per protocol.

## 2019-02-01 ENCOUNTER — OFFICE VISIT (OUTPATIENT)
Dept: CARDIOLOGY | Facility: CLINIC | Age: 59
End: 2019-02-01

## 2019-02-01 VITALS
DIASTOLIC BLOOD PRESSURE: 69 MMHG | SYSTOLIC BLOOD PRESSURE: 119 MMHG | HEIGHT: 65 IN | HEART RATE: 74 BPM | BODY MASS INDEX: 40.98 KG/M2 | WEIGHT: 246 LBS

## 2019-02-01 DIAGNOSIS — I48.0 PAF (PAROXYSMAL ATRIAL FIBRILLATION) (HCC): Primary | ICD-10-CM

## 2019-02-01 DIAGNOSIS — I10 ESSENTIAL HYPERTENSION: ICD-10-CM

## 2019-02-01 DIAGNOSIS — I48.19 PERSISTENT ATRIAL FIBRILLATION (HCC): ICD-10-CM

## 2019-02-01 PROCEDURE — 99213 OFFICE O/P EST LOW 20 MIN: CPT | Performed by: NURSE PRACTITIONER

## 2019-02-01 PROCEDURE — 93000 ELECTROCARDIOGRAM COMPLETE: CPT | Performed by: NURSE PRACTITIONER

## 2019-02-01 NOTE — PROGRESS NOTES
Subjective:   Renetta Horner  1960  [phone]      01/05/2018    [facility] CARDIOLOGY    Gustabo Quach MD  [address] [address] [place], [place] [id number]    REFERRING DOCTOR: Dr Seals      Patient ID: Renetta Horner is a 58 y.o. female.    Chief Complaint:   Chief Complaint   Patient presents with   • Persistent atrial fibrillation (CMS/HCC)     Problem List:  1. Persistent Atrial Fibrillation s/p ECV X2 with IRAF and failed Flecainide  A. Diagnosed in 2015. CHADS-VASc = 2   B. No a/c secondary to hemarthrosis on Xarelto.   C. Echo show EF of 65%. Stress test negative for ischemia.   D. Restarted on Pradaxa and no major issues 150 mg BID  E. Pulmonary vein ablation/roof line/box in lesion along the posterior wall on 01/18/2017  F. Recurrent Afib now on Tikosyn 500 mcg BID    2. Labile HTN  Cant take Norvasc due to side effects and also micardis in the past but insurance didn't cover.  Has had workup for secondary causes of HTN per the patient and family and all normal.     Allergies   Allergen Reactions   • Atenolol Shortness Of Breath     Hard to breath   • Indomethacin Shortness Of Breath     , HIVES    • Lisinopril Shortness Of Breath   • Penicillins Anaphylaxis   • Other Itching     [place]-- hives and vomiting   • Sulfa Antibiotics Nausea And Vomiting   • Ultram [Tramadol] Nausea And Vomiting   • Xarelto [Rivaroxaban]      Bleeding knee   • Codeine Nausea Only     ITCHING    Norvasc causes swelling       Current Outpatient Prescriptions:   •  CloNIDine (CATAPRES) 0.1 MG tablet, TAKE ONE TABLET BY MOUTH THREE TIMES DAILY AS NEEDED FOR HIGH BLOOD PRESSURE (SBP >160 OR DBP >90) (Patient taking differently: takes bid. 3rd. dose prn), Disp: 30 tablet, Rfl: 3  •  dabigatran etexilate (PRADAXA) 150 MG capsu, Take 1 capsule by mouth 2 (Two) Times a Day., Disp: 60 capsule, Rfl: 7  •  diltiaZEM CD (CARDIZEM CD) 240 MG 24 hr capsule, Take 1 capsule by mouth Daily., Disp: 30  capsule, Rfl: 5  •  docusate sodium (COLACE) 100 MG capsule, Take 200 mg by mouth Daily., Disp: , Rfl:   •  dofetilide (TIKOSYN) 500 MCG capsule, Take 1 capsule by mouth Every 12 (Twelve) Hours., Disp: 60 capsule, Rfl: 5  •  doxycycline (VIBRAMYCIN) 100 MG capsule, Take 100 mg by mouth 2 (Two) Times a Day., Disp: , Rfl:   •  furosemide (LASIX) 20 MG tablet, Take 1 tablet by mouth Daily As Needed (edema)., Disp: 20 tablet, Rfl: 3  •  irbesartan (AVAPRO) 300 MG tablet, Take 1 tablet by mouth Every Night., Disp: 30 tablet, Rfl: 5  •  metFORMIN (GLUCOPHAGE) 500 MG tablet, Take 500 mg by mouth 2 (Two) Times a Day With Meals., Disp: , Rfl:   •  pantoprazole (PROTONIX) 40 MG EC tablet, Take 1 tablet by mouth Daily., Disp: 30 tablet, Rfl: 6  •  vitamin D (ERGOCALCIFEROL) 24212 units capsule capsule, 1 (One) Time Per Week., Disp: , Rfl:   •  coenzyme Q10 100 MG capsule, Take 50 mg by mouth Daily., Disp: , Rfl:   •  hydrOXYzine (ATARAX) 25 MG tablet, Take 1 tablet by mouth 3 (Three) Times a Day As Needed for Itching., Disp: 30 tablet, Rfl: 1      Atrial Fibrillation   Past medical history includes atrial fibrillation.     HPI: Ms. Horner is a 57 year old female with history Persistent Afib s/p PVA in Jan 2017 with recurrence of Afib now controlled with Tikosyn. She has not had any recurrence of Afib since last visit. Only rare palpitations. She is anticoagulated with Pradaxa. No major bleeding/bruising. No TIA's or CVAs. BP controlled. Continues to lose weight. No issues with chest pain, shortness of breath, fevers, chills, night sweats, PND, orthopnea or palpitations. No recent ER visits or hospital stays.      The following portions of the patient's history were reviewed and updated as appropriate: allergies, current medications, past family history, past medical history, past social history, past surgical history and problem list.    ROS   14 point ROS negative except as outlined in problem list, HPI and other parts of  "the note.      ECG 12 Lead  Date/Time: 2/1/2019 11:49 AM  Performed by: Michelle Best APRN  Authorized by: Michelle Best APRN   Comparison: compared with previous ECG from 7/6/2018  Rhythm: sinus rhythm  BPM: 66  Conduction: 1st degree               Objective:       Vitals:    02/01/19 1140   BP: 119/69   BP Location: Left arm   Patient Position: Sitting   Pulse: 74   Weight: 112 kg (246 lb)   Height: 165.1 cm (65\")       GENERAL: Well-developed, well-nourished patient in no acute distress.  HEENT: Normocephalic, atraumatic, PERRLA. Moist mucous membranes.  NECK: No JVD present at 30°. No carotid bruits auscultated.  LUNGS: Clear to auscultation. Non labored.  CARDIOVASCULAR: Heart has a regular rate and rhythm. No murmurs, gallops or rubs noted.   ABDOMEN: Soft, nontender. Positive bowel sounds.  MUSCULOSKELETAL: No gross deformities. No clubbing, cyanosis, or lower extremity edema.  SKIN: Pink, warm  Neuro: Nonfocal exam. Gait intact  Ext: No edema or bruising    The patient's old records including ambulatory rhythm recordings (ECGs, Holter/event monitor) were reviewed and discussed.      Lab Review:   Results for orders placed or performed during the hospital encounter of 06/07/17   Basic Metabolic Panel   Result Value Ref Range    Glucose 118 (H) 70 - 100 mg/dL    BUN 13 9 - 23 mg/dL    Creatinine 0.60 0.60 - 1.30 mg/dL    Sodium 136 132 - 146 mmol/L    Potassium 4.2 3.5 - 5.5 mmol/L    Chloride 103 99 - 109 mmol/L    CO2 26.0 20.0 - 31.0 mmol/L    Calcium 9.6 8.7 - 10.4 mg/dL    eGFR Non African Amer 103 >60 mL/min/1.73    BUN/Creatinine Ratio 21.7 7.0 - 25.0    Anion Gap 7.0 3.0 - 11.0 mmol/L   Magnesium   Result Value Ref Range    Magnesium 1.9 1.3 - 2.7 mg/dL   Magnesium   Result Value Ref Range    Magnesium 2.2 1.3 - 2.7 mg/dL   POC Glucose Fingerstick   Result Value Ref Range    Glucose 88 70 - 130 mg/dL   POC Glucose Fingerstick   Result Value Ref Range    Glucose 123 70 - 130 mg/dL "   POC Glucose Fingerstick   Result Value Ref Range    Glucose 134 (H) 70 - 130 mg/dL   POC Glucose Fingerstick   Result Value Ref Range    Glucose 129 70 - 130 mg/dL   POC Glucose Fingerstick   Result Value Ref Range    Glucose 118 70 - 130 mg/dL   POC Glucose Fingerstick   Result Value Ref Range    Glucose 122 70 - 130 mg/dL   POC Glucose Fingerstick   Result Value Ref Range    Glucose 133 (H) 70 - 130 mg/dL   POC Glucose Fingerstick   Result Value Ref Range    Glucose 129 70 - 130 mg/dL   POC Glucose Fingerstick   Result Value Ref Range    Glucose 97 70 - 130 mg/dL           Diagnosis:   1. Persistent atrial fibrillation  2. Obesity, Class III, BMI 40-49.9 (morbid obesity)  3. Essential hypertension  Assessment & Plan:   1. Persistent Afib s/p PVA in Jan 2017 with recurrences of Afib and now controlled on Tikosyn 500 mcg BID, Cardizem and Pradaxa. Continue on current medical Rx. QTc ok today  2. Labile HTN now improved with weight loss and continuing on current medical Rx  3. Follow up with me in 6 mths         CC: Dr Mt Seals    Electronically signed by SUSANA Brady, 02/01/19, 11:43 AM.

## 2019-03-24 DIAGNOSIS — I10 ESSENTIAL HYPERTENSION: ICD-10-CM

## 2019-03-25 RX ORDER — IRBESARTAN 300 MG/1
TABLET ORAL
Qty: 30 TABLET | Refills: 5 | Status: SHIPPED | OUTPATIENT
Start: 2019-03-25 | End: 2019-09-29 | Stop reason: SDUPTHER

## 2019-04-25 DIAGNOSIS — I48.0 PAROXYSMAL ATRIAL FIBRILLATION (HCC): ICD-10-CM

## 2019-04-25 DIAGNOSIS — R60.9 PERIPHERAL EDEMA: ICD-10-CM

## 2019-04-25 RX ORDER — DILTIAZEM HYDROCHLORIDE 240 MG/1
CAPSULE, EXTENDED RELEASE ORAL
Qty: 90 CAPSULE | Refills: 3 | Status: SHIPPED | OUTPATIENT
Start: 2019-04-25 | End: 2020-03-04 | Stop reason: SDUPTHER

## 2019-04-30 ENCOUNTER — TELEPHONE (OUTPATIENT)
Dept: CARDIOLOGY | Facility: CLINIC | Age: 59
End: 2019-04-30

## 2019-04-30 RX ORDER — RANITIDINE 150 MG/1
150 TABLET ORAL 2 TIMES DAILY
Qty: 60 TABLET | Refills: 11 | Status: SHIPPED | OUTPATIENT
Start: 2019-04-30 | End: 2019-11-13

## 2019-04-30 NOTE — TELEPHONE ENCOUNTER
Received fax from [facility] stated that pt has been using PPI for more than 90 days. Per Katelynn, see if pt will try Zantac 150 BID.           Spoke w/ pt, she stated he's never tried Zantac. States that her old PCP put her on Protonix. I explained that her insurance doesn't want her on PPI for more than 90 days, she agreed to try rx.  I explained that we will D/C Protonix  and have her start Zantac. Confirmed px and sent in for pt.

## 2019-05-09 ENCOUNTER — OFFICE VISIT (OUTPATIENT)
Dept: CARDIOLOGY | Facility: CLINIC | Age: 59
End: 2019-05-09

## 2019-05-09 VITALS
HEIGHT: 65 IN | WEIGHT: 252.2 LBS | DIASTOLIC BLOOD PRESSURE: 70 MMHG | OXYGEN SATURATION: 97 % | HEART RATE: 62 BPM | BODY MASS INDEX: 42.02 KG/M2 | SYSTOLIC BLOOD PRESSURE: 144 MMHG

## 2019-05-09 DIAGNOSIS — R00.2 PALPITATIONS: ICD-10-CM

## 2019-05-09 DIAGNOSIS — I10 ESSENTIAL HYPERTENSION: Primary | ICD-10-CM

## 2019-05-09 DIAGNOSIS — I48.19 PERSISTENT ATRIAL FIBRILLATION (HCC): ICD-10-CM

## 2019-05-09 PROCEDURE — 99213 OFFICE O/P EST LOW 20 MIN: CPT | Performed by: NURSE PRACTITIONER

## 2019-05-09 NOTE — PROGRESS NOTES
Subjective   Renetta Horner is a 58 y.o. female     Chief Complaint   Patient presents with   • Follow-up     Pt in office for 6mth follow up appt    • Hypertension   • Atrial Fibrillation       HPI    Problem List:    1. Atrial Fibrillation   1.1 CHADS score 2; patient Pradaxa and Tikosyn  1.2 Event Monitor 4/23-5/3/16 - Afib and Afib with RVR  1.3 Ablation with Dr. Rey 1/18/17  1.4 Cardioversion with Dr. Rey 4/28/17  2. Hypertension  2.1 Echo 4/8/15 - EF > 65%; DD II; trace MR  3. Chest Pain   3.1 Stress Test 4/8/15 - no ischemia   4. Diabetes Mellitus II  5. CKD I Dr. Kelly     Patient is a 58-year-old female who presents today for a follow-up with her  at her side.  She denies any chest pain, pressure, palpitations, fluttering, dizziness, presyncope, syncope, orthopnea, PND or edema.  She denies any shortness of breath with activity.  She denies any signs of bleeding or cerebral ischemic events.      Current Outpatient Medications   Medication Sig Dispense Refill   • CARTIA  MG 24 hr capsule TAKE 1 CAPSULE BY MOUTH ONCE DAILY 90 capsule 3   • docusate sodium (COLACE) 100 MG capsule Take 200 mg by mouth Daily.     • dofetilide (TIKOSYN) 500 MCG capsule Take 1 capsule by mouth Every 12 (Twelve) Hours. 60 capsule 11   • furosemide (LASIX) 20 MG tablet Take 1 tablet by mouth Daily As Needed (edema). 20 tablet 3   • irbesartan (AVAPRO) 300 MG tablet TAKE ONE TABLET BY MOUTH EVERY NIGHT 30 tablet 5   • metFORMIN (GLUCOPHAGE) 500 MG tablet Take 500 mg by mouth Daily With Breakfast.     • PRADAXA 150 MG capsu TAKE ONE CAPSULE BY MOUTH TWICE DAILY 60 capsule 7   • raNITIdine (ZANTAC) 150 MG tablet Take 1 tablet by mouth 2 (Two) Times a Day. 60 tablet 11   • vitamin D (ERGOCALCIFEROL) 72426 units capsule capsule Take 50,000 Units by mouth 1 (One) Time Per Week.       No current facility-administered medications for this visit.        ALLERGIES    Atenolol; Indomethacin; Lisinopril; Penicillins;  Other; Sulfa antibiotics; Ultram [tramadol]; Xarelto [rivaroxaban]; and Codeine    Past Medical History:   Diagnosis Date   • Arthritis    • Atrial fibrillation (CMS/HCC)     CHADS-VASC = 3   • Atrial fibrillation (CMS/HCC) 5/9/2016    1. Persistent Atrial Fibrillation s/p ECV X2 with IRAF and failed Flecainide     A. Diagnosed in 2015. CHADS-VASc = 3 (DM, HTN, female)      B. Xarelto & ASA stopped due to hemarthrosis of knee.        C. Restarted on Pradaxa and no major issues 150 mg BID     D. Echo 4/8/15 showed EF 65%, moderate, Grade II diastolic dysfunction, mild SERA (LA 3.9cm), normal valves     E. Myocardial Perfusion study 4/8/15: negative for ischemia, EF 68%     • Chest pain    • Diabetes mellitus (CMS/HCC)     on oral agents, Type 2 , Patient states last a1c was 5.9 --DX'D 5 YEARS AGO, CHECKS BLOOD SUGAR DAILY AVERAGE 115-122   • Dizziness    • Fatigue    • GERD (gastroesophageal reflux disease)    • Headache    • Heart murmur    • Hypertension     CONTROLLED WITH MEDS PER PT    • Kidney stone    • Palpitations    • PONV (postoperative nausea and vomiting)    • Wears dentures     UPPER PLATE    • Wears dentures     upper   • Wears eyeglasses     reading       Social History     Socioeconomic History   • Marital status:      Spouse name: Not on file   • Number of children: Not on file   • Years of education: Not on file   • Highest education level: Not on file   Tobacco Use   • Smoking status: Former Smoker     Types: Cigarettes   • Smokeless tobacco: Never Used   • Tobacco comment: QUIT 1997   Substance and Sexual Activity   • Alcohol use: No   • Drug use: No   • Sexual activity: Defer       Family History   Problem Relation Age of Onset   • Coronary artery disease Mother    • Breast cancer Mother    • Diabetes Mother    • Other Mother         Benign prostatic hypertrophy       Review of Systems   Constitutional: Negative for diaphoresis and fatigue.   HENT: Negative for congestion, rhinorrhea and  "sore throat.    Eyes: Positive for visual disturbance (reading glasses).   Respiratory: Negative for chest tightness and shortness of breath.    Cardiovascular: Negative for chest pain (Denies CP ), palpitations and leg swelling.   Gastrointestinal: Negative for abdominal pain, constipation, diarrhea, nausea and vomiting.        New heartburn rx not working as well as old one    Endocrine: Negative for cold intolerance and heat intolerance.   Genitourinary: Negative for difficulty urinating, dysuria, frequency and urgency.   Musculoskeletal: Negative for arthralgias, back pain and neck pain.   Skin: Negative for rash and wound.   Allergic/Immunologic: Positive for environmental allergies and food allergies.   Neurological: Negative for dizziness, syncope, weakness, light-headedness, numbness and headaches.   Hematological: Does not bruise/bleed easily.   Psychiatric/Behavioral: Negative for sleep disturbance (Denies SoA at HS).       Objective   /70   Pulse 62   Ht 165.1 cm (65\")   Wt 114 kg (252 lb 3.2 oz)   LMP  (LMP Unknown)   SpO2 97%   BMI 41.97 kg/m²   Vitals:    05/09/19 1316   BP: 144/70   Pulse: 62   SpO2: 97%   Weight: 114 kg (252 lb 3.2 oz)   Height: 165.1 cm (65\")      Lab Results (most recent)     None        Physical Exam   Constitutional: She is oriented to person, place, and time. Vital signs are normal. She appears well-developed and well-nourished. She is active and cooperative.   HENT:   Head: Normocephalic.   Eyes: Lids are normal.   Neck: Normal carotid pulses, no hepatojugular reflux and no JVD present. Carotid bruit is not present.   Cardiovascular: Normal rate, regular rhythm and normal heart sounds.   Pulses:       Radial pulses are 2+ on the right side, and 2+ on the left side.        Dorsalis pedis pulses are 2+ on the right side, and 2+ on the left side.        Posterior tibial pulses are 2+ on the right side, and 2+ on the left side.   No edema BLE.    Pulmonary/Chest: Effort " normal and breath sounds normal.   Abdominal: Normal appearance and bowel sounds are normal.   Neurological: She is alert and oriented to person, place, and time.   Skin: Skin is warm, dry and intact.   Psychiatric: She has a normal mood and affect. Her speech is normal and behavior is normal. Judgment and thought content normal. Cognition and memory are normal.       Procedure   Procedures         Assessment/Plan      Diagnosis Plan   1. Essential hypertension     2. Persistent atrial fibrillation (CMS/HCC)     3. Palpitations         Return in about 6 months (around 11/9/2019).       Hypertension - doing well on Cartia and irbesartan.  AFib - on Pradaxa and tikosyn.  Palpitations - stable.  She will continue her medication regimen.  She will follow-up in 6 mos or sooner if any changes.       Patient's Body mass index is 41.97 kg/m². BMI is above normal parameters. Recommendations include: educational material.      Electronically signed by:

## 2019-05-09 NOTE — PATIENT INSTRUCTIONS
"Fat and Cholesterol Restricted Eating Plan  Getting too much fat and cholesterol in your diet may cause health problems. Choosing the right foods helps keep your fat and cholesterol at normal levels. This can keep you from getting certain diseases.  Your doctor may recommend an eating plan that includes:  · Total fat: ______% or less of total calories a day.  · Saturated fat: ______% or less of total calories a day.  · Cholesterol: less than _________mg a day.  · Fiber: ______g a day.    What are tips for following this plan?  General tips  · Work with your doctor to lose weight if you need to.  · Avoid:  ? Foods with added sugar.  ? Fried foods.  ? Foods with partially hydrogenated oils.  · Limit alcohol intake to no more than 1 drink a day for nonpregnant women and 2 drinks a day for men. One drink equals 12 oz of beer, 5 oz of wine, or 1½ oz of hard liquor.  Reading food labels  · Check food labels for:  ? Trans fats.  ? Partially hydrogenated oils.  ? Saturated fat (g) in each serving.  ? Cholesterol (mg) in each serving.  ? Fiber (g) in each serving.  · Choose foods with healthy fats, such as:  ? Monounsaturated fats.  ? Polyunsaturated fats.  ? Omega-3 fats.  · Choose grain products that have whole grains. Look for the word \"whole\" as the first word in the ingredient list.  Cooking  · Cook foods using low-fat methods. These include baking, boiling, grilling, and broiling.  · Eat more home-cooked foods. Eat at restaurants and buffets less often.  · Avoid cooking using saturated fats, such as butter, cream, palm oil, palm kernel oil, and coconut oil.  Meal planning  · At meals, divide your plate into four equal parts:  ? Fill one-half of your plate with vegetables and green salads.  ? Fill one-fourth of your plate with whole grains.  ? Fill one-fourth of your plate with low-fat (lean) protein foods.  · Eat fish that is high in omega-3 fats at least two times a week. This includes mackerel, tuna, sardines, and " salmon.  · Eat foods that are high in fiber, such as whole grains, beans, apples, broccoli, carrots, peas, and barley.  Recommended foods  Grains  · Whole grains, such as whole wheat or whole grain breads, crackers, cereals, and pasta. Unsweetened oatmeal, bulgur, barley, quinoa, or brown rice. Corn or whole wheat flour tortillas.  Vegetables  · Fresh or frozen vegetables (raw, steamed, roasted, or grilled). Green salads.  Fruits  · All fresh, canned (in natural juice), or frozen fruits.  Meats and other protein foods  · Ground beef (85% or leaner), grass-fed beef, or beef trimmed of fat. Skinless chicken or turkey. Ground chicken or turkey. Pork trimmed of fat. All fish and seafood. Egg whites. Dried beans, peas, or lentils. Unsalted nuts or seeds. Unsalted canned beans. Nut butters without added sugar or oil.  Dairy  · Low-fat or nonfat dairy products, such as skim or 1% milk, 2% or reduced-fat cheeses, low-fat and fat-free ricotta or cottage cheese, or plain low-fat and nonfat yogurt.  Fats and oils  · Tub margarine without trans fats. Light or reduced-fat mayonnaise and salad dressings. Avocado. Olive, canola, sesame, or safflower oils.  The items listed above may not be a complete list of recommended foods or beverages. Contact your dietitian for more options.  Foods to avoid  Grains  · White bread. White pasta. White rice. Cornbread. Bagels, pastries, and croissants. Crackers and snack foods that contain trans fat and hydrogenated oils.  Vegetables  · Vegetables cooked in cheese, cream, or butter sauce. Fried vegetables.  Fruits  · Canned fruit in heavy syrup. Fruit in cream or butter sauce. Fried fruit.  Meats and other protein foods  · Fatty cuts of meat. Ribs, chicken wings, merritt, sausage, bologna, salami, chitterlings, fatback, hot dogs, bratwurst, and packaged lunch meats. Liver and organ meats. Whole eggs and egg yolks. Chicken and turkey with skin. Fried meat.  Dairy  · Whole or 2% milk, cream,  half-and-half, and cream cheese. Whole milk cheeses. Whole-fat or sweetened yogurt. Full-fat cheeses. Nondairy creamers and whipped toppings. Processed cheese, cheese spreads, and cheese curds.  Beverages  · Alcohol. Sugar-sweetened drinks such as sodas, lemonade, and fruit drinks.  Fats and oils  · Butter, stick margarine, lard, shortening, ghee, or merritt fat. Coconut, palm kernel, and palm oils.  Sweets and desserts  · Corn syrup, sugars, honey, and molasses. Candy. Jam and jelly. Syrup. Sweetened cereals. Cookies, pies, cakes, donuts, muffins, and ice cream.  The items listed above may not be a complete list of foods and beverages to avoid. Contact your dietitian for more information.  Summary  · Choosing the right foods helps keep your fat and cholesterol at normal levels. This can keep you from getting certain diseases.  · At meals, fill one-half of your plate with vegetables and green salads.  · Eat high-fiber foods, like whole grains, beans, apples, carrots, peas, and barley.  · Limit added sugar, saturated fats, alcohol, and fried foods.  This information is not intended to replace advice given to you by your health care provider. Make sure you discuss any questions you have with your health care provider.  Document Released: 06/18/2013 Document Revised: 09/04/2018 Document Reviewed: 09/04/2018  [employer] Patient Education © 2019 [employer]  BMI for Adults  Body mass index (BMI) is a number that is calculated from a person's weight and height. In most adults, the number is used to find how much of an adult's weight is made up of fat. BMI is not as accurate as a direct measure of body fat.  How is BMI calculated?  BMI is calculated by dividing weight in kilograms by height in meters squared. It can also be calculated by dividing weight in pounds by height in inches squared, then multiplying the resulting number by 703. Charts are available to help you find your BMI quickly and easily without  "doing this calculation.  How is BMI interpreted?  Health care professionals use BMI charts to identify whether an adult is underweight, at a normal weight, or overweight based on the following guidelines:  · Underweight: BMI less than 18.5.  · Normal weight: BMI between 18.5 and 24.9.  · Overweight: BMI between 25 and 29.9.  · Obese: BMI of 30 and above.    BMI is usually interpreted the same for males and females.  Weight includes both fat and muscle, so someone with a muscular build, such as an athlete, may have a BMI that is higher than 24.9. In cases like these, BMI may not accurately depict body fat. To determine if excess body fat is the cause of a BMI of 25 or higher, further assessments may need to be done by a health care provider.  Why is BMI a useful tool?  BMI is used to identify a possible weight problem that may be related to a medical problem or may increase the risk for medical problems. BMI can also be used to promote changes to reach a healthy weight.  This information is not intended to replace advice given to you by your health care provider. Make sure you discuss any questions you have with your health care provider.  Document Released: 08/29/2005 Document Revised: 04/27/2017 Document Reviewed: 05/15/2015  [employer] Patient Education © 2018 [employer]    Hypertension  Hypertension, commonly called high blood pressure, is when the force of blood pumping through the arteries is too strong. The arteries are the blood vessels that carry blood from the heart throughout the body. Hypertension forces the heart to work harder to pump blood and may cause arteries to become narrow or stiff. Having untreated or uncontrolled hypertension can cause heart attacks, strokes, kidney disease, and other problems.  A blood pressure reading consists of a higher number over a lower number. Ideally, your blood pressure should be below 120/80. The first (\"top\") number is called the systolic pressure. It " "is a measure of the pressure in your arteries as your heart beats. The second (\"bottom\") number is called the diastolic pressure. It is a measure of the pressure in your arteries as the heart relaxes.  What are the causes?  The cause of this condition is not known.  What increases the risk?  Some risk factors for high blood pressure are under your control. Others are not.  Factors you can change  · Smoking.  · Having type 2 diabetes mellitus, high cholesterol, or both.  · Not getting enough exercise or physical activity.  · Being overweight.  · Having too much fat, sugar, calories, or salt (sodium) in your diet.  · Drinking too much alcohol.  Factors that are difficult or impossible to change  · Having chronic kidney disease.  · Having a family history of high blood pressure.  · Age. Risk increases with age.  · Race. You may be at higher risk if you are -American.  · Gender. Men are at higher risk than women before age 45. After age 65, women are at higher risk than men.  · Having obstructive sleep apnea.  · Stress.  What are the signs or symptoms?  Extremely high blood pressure (hypertensive crisis) may cause:  · Headache.  · Anxiety.  · Shortness of breath.  · Nosebleed.  · Nausea and vomiting.  · Severe chest pain.  · Jerky movements you cannot control (seizures).    How is this diagnosed?  This condition is diagnosed by measuring your blood pressure while you are seated, with your arm resting on a surface. The cuff of the blood pressure monitor will be placed directly against the skin of your upper arm at the level of your heart. It should be measured at least twice using the same arm. Certain conditions can cause a difference in blood pressure between your right and left arms.  Certain factors can cause blood pressure readings to be lower or higher than normal (elevated) for a short period of time:  · When your blood pressure is higher when you are in a health care provider's office than when you are at " home, this is called white coat hypertension. Most people with this condition do not need medicines.  · When your blood pressure is higher at home than when you are in a health care provider's office, this is called masked hypertension. Most people with this condition may need medicines to control blood pressure.    If you have a high blood pressure reading during one visit or you have normal blood pressure with other risk factors:  · You may be asked to return on a different day to have your blood pressure checked again.  · You may be asked to monitor your blood pressure at home for 1 week or longer.    If you are diagnosed with hypertension, you may have other blood or imaging tests to help your health care provider understand your overall risk for other conditions.  How is this treated?  This condition is treated by making healthy lifestyle changes, such as eating healthy foods, exercising more, and reducing your alcohol intake. Your health care provider may prescribe medicine if lifestyle changes are not enough to get your blood pressure under control, and if:  · Your systolic blood pressure is above 130.  · Your diastolic blood pressure is above 80.    Your personal target blood pressure may vary depending on your medical conditions, your age, and other factors.  Follow these instructions at home:  Eating and drinking  · Eat a diet that is high in fiber and potassium, and low in sodium, added sugar, and fat. An example eating plan is called the DASH (Dietary Approaches to Stop Hypertension) diet. To eat this way:  ? Eat plenty of fresh fruits and vegetables. Try to fill half of your plate at each meal with fruits and vegetables.  ? Eat whole grains, such as whole wheat pasta, brown rice, or whole grain bread. Fill about one quarter of your plate with whole grains.  ? Eat or drink low-fat dairy products, such as skim milk or low-fat yogurt.  ? Avoid fatty cuts of meat, processed or cured meats, and poultry with  skin. Fill about one quarter of your plate with lean proteins, such as fish, chicken without skin, beans, eggs, and tofu.  ? Avoid premade and processed foods. These tend to be higher in sodium, added sugar, and fat.  · Reduce your daily sodium intake. Most people with hypertension should eat less than 1,500 mg of sodium a day.  · Limit alcohol intake to no more than 1 drink a day for nonpregnant women and 2 drinks a day for men. One drink equals 12 oz of beer, 5 oz of wine, or 1½ oz of hard liquor.  Lifestyle  · Work with your health care provider to maintain a healthy body weight or to lose weight. Ask what an ideal weight is for you.  · Get at least 30 minutes of exercise that causes your heart to beat faster (aerobic exercise) most days of the week. Activities may include walking, swimming, or biking.  · Include exercise to strengthen your muscles (resistance exercise), such as pilates or lifting weights, as part of your weekly exercise routine. Try to do these types of exercises for 30 minutes at least 3 days a week.  · Do not use any products that contain nicotine or tobacco, such as cigarettes and e-cigarettes. If you need help quitting, ask your health care provider.  · Monitor your blood pressure at home as told by your health care provider.  · Keep all follow-up visits as told by your health care provider. This is important.  Medicines  · Take over-the-counter and prescription medicines only as told by your health care provider. Follow directions carefully. Blood pressure medicines must be taken as prescribed.  · Do not skip doses of blood pressure medicine. Doing this puts you at risk for problems and can make the medicine less effective.  · Ask your health care provider about side effects or reactions to medicines that you should watch for.  Contact a health care provider if:  · You think you are having a reaction to a medicine you are taking.  · You have headaches that keep coming back  (recurring).  · You feel dizzy.  · You have swelling in your ankles.  · You have trouble with your vision.  Get help right away if:  · You develop a severe headache or confusion.  · You have unusual weakness or numbness.  · You feel faint.  · You have severe pain in your chest or abdomen.  · You vomit repeatedly.  · You have trouble breathing.  Summary  · Hypertension is when the force of blood pumping through your arteries is too strong. If this condition is not controlled, it may put you at risk for serious complications.  · Your personal target blood pressure may vary depending on your medical conditions, your age, and other factors. For most people, a normal blood pressure is less than 120/80.  · Hypertension is treated with lifestyle changes, medicines, or a combination of both. Lifestyle changes include weight loss, eating a healthy, low-sodium diet, exercising more, and limiting alcohol.  This information is not intended to replace advice given to you by your health care provider. Make sure you discuss any questions you have with your health care provider.  Document Released: 12/18/2006 Document Revised: 11/15/2017 Document Reviewed: 11/15/2017  [employer] Interactive Patient Education © 2019 [employer]

## 2019-08-01 DIAGNOSIS — I48.0 PAROXYSMAL ATRIAL FIBRILLATION (HCC): ICD-10-CM

## 2019-08-01 RX ORDER — ATENOLOL 100 MG/1
TABLET ORAL
Qty: 60 CAPSULE | Refills: 11 | Status: SHIPPED | OUTPATIENT
Start: 2019-08-01 | End: 2020-08-05

## 2019-09-29 DIAGNOSIS — I10 ESSENTIAL HYPERTENSION: ICD-10-CM

## 2019-09-30 RX ORDER — IRBESARTAN 300 MG/1
TABLET ORAL
Qty: 30 TABLET | Refills: 5 | Status: SHIPPED | OUTPATIENT
Start: 2019-09-30 | End: 2020-03-04 | Stop reason: SDUPTHER

## 2019-10-18 ENCOUNTER — OFFICE VISIT (OUTPATIENT)
Dept: CARDIOLOGY | Facility: CLINIC | Age: 59
End: 2019-10-18

## 2019-10-18 VITALS
BODY MASS INDEX: 44.98 KG/M2 | HEART RATE: 62 BPM | HEIGHT: 65 IN | DIASTOLIC BLOOD PRESSURE: 70 MMHG | WEIGHT: 270 LBS | OXYGEN SATURATION: 97 % | SYSTOLIC BLOOD PRESSURE: 144 MMHG

## 2019-10-18 DIAGNOSIS — I48.19 PERSISTENT ATRIAL FIBRILLATION (HCC): Primary | ICD-10-CM

## 2019-10-18 DIAGNOSIS — I10 ESSENTIAL HYPERTENSION: ICD-10-CM

## 2019-10-18 DIAGNOSIS — K21.00 GASTROESOPHAGEAL REFLUX DISEASE WITH ESOPHAGITIS: ICD-10-CM

## 2019-10-18 PROCEDURE — 93000 ELECTROCARDIOGRAM COMPLETE: CPT | Performed by: PHYSICIAN ASSISTANT

## 2019-10-18 PROCEDURE — 99213 OFFICE O/P EST LOW 20 MIN: CPT | Performed by: PHYSICIAN ASSISTANT

## 2019-10-18 NOTE — PROGRESS NOTES
Renetta Horner  1960  [phone]    10/18/2019    [facility] CARDIOLOGY     Gustabo Quach MD  [address] [place] [id number]    Chief Complaint   Patient presents with   • Atrial Fibrillation       Problem List:      Problem List:  1. Persistent Atrial Fibrillation s/p ECV X2 with IRAF and failed Flecainide  A. Diagnosed in 2015. CHADS-VASc = 2   B. No a/c secondary to hemarthrosis on Xarelto.   C. Echo show EF of 65%. Stress test negative for ischemia.   D. Restarted on Pradaxa and no major issues 150 mg BID  E. Pulmonary vein ablation/roof line/box in lesion along the posterior wall on 01/18/2017  F. Recurrent Afib now on Tikosyn 500 mcg BID     2. Labile HTN  Cant take Norvasc due to side effects and also micardis in the past but insurance didn't cover.  Has had workup for secondary causes of HTN per the patient and family and all normal.        Allergies  Allergies   Allergen Reactions   • Atenolol Shortness Of Breath     Hard to breath   • Indomethacin Shortness Of Breath     , HIVES    • Lisinopril Shortness Of Breath   • Penicillins Anaphylaxis   • Other Itching     [place]-- hives and vomiting   • Sulfa Antibiotics Nausea And Vomiting   • Ultram [Tramadol] Nausea And Vomiting   • Xarelto [Rivaroxaban]      Bleeding knee   • Codeine Nausea Only     ITCHING        Current Medications    Current Outpatient Medications:   •  CARTIA  MG 24 hr capsule, TAKE 1 CAPSULE BY MOUTH ONCE DAILY, Disp: 90 capsule, Rfl: 3  •  docusate sodium (COLACE) 100 MG capsule, Take 200 mg by mouth Daily., Disp: , Rfl:   •  dofetilide (TIKOSYN) 500 MCG capsule, Take 1 capsule by mouth Every 12 (Twelve) Hours., Disp: 60 capsule, Rfl: 11  •  furosemide (LASIX) 20 MG tablet, Take 1 tablet by mouth Daily As Needed (edema)., Disp: 20 tablet, Rfl: 3  •  irbesartan (AVAPRO) 300 MG tablet, TAKE ONE TABLET BY MOUTH EVERY NIGHT, Disp: 30 tablet, Rfl: 5  •  metFORMIN (GLUCOPHAGE) 500  "MG tablet, Take 500 mg by mouth Daily With Breakfast., Disp: , Rfl:   •  PRADAXA 150 MG capsu, TAKE 1 CAPSULE BY MOUTH TWICE DAILY, Disp: 60 capsule, Rfl: 11  •  raNITIdine (ZANTAC) 150 MG tablet, Take 1 tablet by mouth 2 (Two) Times a Day., Disp: 60 tablet, Rfl: 11  •  vitamin D (ERGOCALCIFEROL) 68558 units capsule capsule, Take 50,000 Units by mouth 1 (One) Time Per Week., Disp: , Rfl:     History of Present Illness     Pt presents for follow up of atrial fibrillation and HTN.  Since we last saw the pt, pt denies any AF episodes, SOB, CP, LH, and dizziness or syncope. Denies any hospitalizations, ER visits, bleeding issues on Pradaxa, or TIA/CVA symptoms. Overall feels well. She complains on GERD symptoms as Katelynn MEZA stopped her Protonix 6 months ago.     ROS:  General:  Denies fatigue, weight gain or loss  Cardiovascular:  Denies CP, PND, syncope, near syncope, edema or palpitations.  Pulmonary:  Denies CHE, cough, or wheezing      Vitals:    10/18/19 1146   BP: 144/70   BP Location: Left arm   Patient Position: Sitting   Pulse: 62   SpO2: 97%   Weight: 122 kg (270 lb)   Height: 165.1 cm (65\")     Body mass index is 44.93 kg/m².  PE:  General: NAD  Neck: no JVD, no carotid bruits, no TM  Heart RRR, NL S1, S2, S4 present, no rubs, murmurs  Lungs: CTA, no wheezes, rhonchi, or rales  Abd: soft, non-tender, NL BS  Ext: No musculoskeletal deformities, no edema, cyanosis, or clubbing  Psych: normal mood and affect    Diagnostic Data:        ECG 12 Lead  Date/Time: 10/18/2019 12:03 PM  Performed by: Selina Spencer PA  Authorized by: Selina Spencer PA   Comparison: compared with previous ECG from 2/1/2019  Similar to previous ECG  Comparison to previous ECG:  ms  Rhythm: sinus rhythm  BPM: 62              1. Persistent atrial fibrillation    2. Essential hypertension    3. Gastroesophageal reflux disease with esophagitis          Plan:    1. Persistent Afib s/p PVA in Jan 2017.   - Well controlled on " tikosyn with normal QTc on EKG today.  2. HTN  - controlled  3. GERD:  - restart Protonix. She will talk to her PCP about getting in to see GI potentially    F/up in 6 months    Electronically signed by LOPEZ Perdue, 10/18/19, 12:04 PM.

## 2019-10-31 ENCOUNTER — TELEPHONE (OUTPATIENT)
Dept: CARDIOLOGY | Facility: CLINIC | Age: 59
End: 2019-10-31

## 2019-10-31 NOTE — TELEPHONE ENCOUNTER
Left msg to let patient know that she needs to contact PCP to get this changed. This was also faxed to pharmacy.  AMY GOULD    ----- Message from SUSANA Kumar sent at 10/31/2019  8:23 AM EDT -----  Regarding: RE: Medication  Yes please   ----- Message -----  From: Sally Geiger MA  Sent: 10/31/2019   8:08 AM  To: SUSANA Kumar  Subject: Medication                                       Rec'd a fax from [facility] that Zantac is on back order. Do you want to change this or send to PCP?

## 2019-11-13 ENCOUNTER — OFFICE VISIT (OUTPATIENT)
Dept: CARDIOLOGY | Facility: CLINIC | Age: 59
End: 2019-11-13

## 2019-11-13 VITALS
HEIGHT: 65 IN | OXYGEN SATURATION: 97 % | SYSTOLIC BLOOD PRESSURE: 162 MMHG | WEIGHT: 278 LBS | BODY MASS INDEX: 46.32 KG/M2 | HEART RATE: 54 BPM | DIASTOLIC BLOOD PRESSURE: 75 MMHG

## 2019-11-13 DIAGNOSIS — I48.19 PERSISTENT ATRIAL FIBRILLATION (HCC): ICD-10-CM

## 2019-11-13 DIAGNOSIS — I10 ESSENTIAL HYPERTENSION: Primary | ICD-10-CM

## 2019-11-13 DIAGNOSIS — R00.2 PALPITATIONS: ICD-10-CM

## 2019-11-13 PROCEDURE — 99214 OFFICE O/P EST MOD 30 MIN: CPT | Performed by: NURSE PRACTITIONER

## 2019-11-13 RX ORDER — OMEPRAZOLE 20 MG/1
20 CAPSULE, DELAYED RELEASE ORAL DAILY
Refills: 2 | COMMUNITY
Start: 2019-10-31 | End: 2020-03-04

## 2019-11-13 NOTE — PATIENT INSTRUCTIONS
"Fat and Cholesterol Restricted Eating Plan  Getting too much fat and cholesterol in your diet may cause health problems. Choosing the right foods helps keep your fat and cholesterol at normal levels. This can keep you from getting certain diseases.  Your doctor may recommend an eating plan that includes:  · Total fat: ______% or less of total calories a day.  · Saturated fat: ______% or less of total calories a day.  · Cholesterol: less than _________mg a day.  · Fiber: ______g a day.  What are tips for following this plan?  Meal planning  · At meals, divide your plate into four equal parts:  ? Fill one-half of your plate with vegetables and green salads.  ? Fill one-fourth of your plate with whole grains.  ? Fill one-fourth of your plate with low-fat (lean) protein foods.  · Eat fish that is high in omega-3 fats at least two times a week. This includes mackerel, tuna, sardines, and salmon.  · Eat foods that are high in fiber, such as whole grains, beans, apples, broccoli, carrots, peas, and barley.  General tips    · Work with your doctor to lose weight if you need to.  · Avoid:  ? Foods with added sugar.  ? Fried foods.  ? Foods with partially hydrogenated oils.  · Limit alcohol intake to no more than 1 drink a day for nonpregnant women and 2 drinks a day for men. One drink equals 12 oz of beer, 5 oz of wine, or 1½ oz of hard liquor.  Reading food labels  · Check food labels for:  ? Trans fats.  ? Partially hydrogenated oils.  ? Saturated fat (g) in each serving.  ? Cholesterol (mg) in each serving.  ? Fiber (g) in each serving.  · Choose foods with healthy fats, such as:  ? Monounsaturated fats.  ? Polyunsaturated fats.  ? Omega-3 fats.  · Choose grain products that have whole grains. Look for the word \"whole\" as the first word in the ingredient list.  Cooking  · Cook foods using low-fat methods. These include baking, boiling, grilling, and broiling.  · Eat more home-cooked foods. Eat at restaurants and buffets " less often.  · Avoid cooking using saturated fats, such as butter, cream, palm oil, palm kernel oil, and coconut oil.  Recommended foods    Fruits  · All fresh, canned (in natural juice), or frozen fruits.  Vegetables  · Fresh or frozen vegetables (raw, steamed, roasted, or grilled). Green salads.  Grains  · Whole grains, such as whole wheat or whole grain breads, crackers, cereals, and pasta. Unsweetened oatmeal, bulgur, barley, quinoa, or brown rice. Corn or whole wheat flour tortillas.  Meats and other protein foods  · Ground beef (85% or leaner), grass-fed beef, or beef trimmed of fat. Skinless chicken or turkey. Ground chicken or turkey. Pork trimmed of fat. All fish and seafood. Egg whites. Dried beans, peas, or lentils. Unsalted nuts or seeds. Unsalted canned beans. Nut butters without added sugar or oil.  Dairy  · Low-fat or nonfat dairy products, such as skim or 1% milk, 2% or reduced-fat cheeses, low-fat and fat-free ricotta or cottage cheese, or plain low-fat and nonfat yogurt.  Fats and oils  · Tub margarine without trans fats. Light or reduced-fat mayonnaise and salad dressings. Avocado. Olive, canola, sesame, or safflower oils.  The items listed above may not be a complete list of foods and beverages you can eat. Contact a dietitian for more information.  Foods to avoid  Fruits  · Canned fruit in heavy syrup. Fruit in cream or butter sauce. Fried fruit.  Vegetables  · Vegetables cooked in cheese, cream, or butter sauce. Fried vegetables.  Grains  · White bread. White pasta. White rice. Cornbread. Bagels, pastries, and croissants. Crackers and snack foods that contain trans fat and hydrogenated oils.  Meats and other protein foods  · Fatty cuts of meat. Ribs, chicken wings, merritt, sausage, bologna, salami, chitterlings, fatback, hot dogs, bratwurst, and packaged lunch meats. Liver and organ meats. Whole eggs and egg yolks. Chicken and turkey with skin. Fried meat.  Dairy  · Whole or 2% milk, cream,  half-and-half, and cream cheese. Whole milk cheeses. Whole-fat or sweetened yogurt. Full-fat cheeses. Nondairy creamers and whipped toppings. Processed cheese, cheese spreads, and cheese curds.  Beverages  · Alcohol. Sugar-sweetened drinks such as sodas, lemonade, and fruit drinks.  Fats and oils  · Butter, stick margarine, lard, shortening, ghee, or merritt fat. Coconut, palm kernel, and palm oils.  Sweets and desserts  · Corn syrup, sugars, honey, and molasses. Candy. Jam and jelly. Syrup. Sweetened cereals. Cookies, pies, cakes, donuts, muffins, and ice cream.  The items listed above may not be a complete list of foods and beverages you should avoid. Contact a dietitian for more information.  Summary  · Choosing the right foods helps keep your fat and cholesterol at normal levels. This can keep you from getting certain diseases.  · At meals, fill one-half of your plate with vegetables and green salads.  · Eat high-fiber foods, like whole grains, beans, apples, carrots, peas, and barley.  · Limit added sugar, saturated fats, alcohol, and fried foods.  This information is not intended to replace advice given to you by your health care provider. Make sure you discuss any questions you have with your health care provider.  Document Released: 06/18/2013 Document Revised: 08/21/2019 Document Reviewed: 09/04/2018  [employer] Interactive Patient Education © 2019 [employer]  BMI for Adults    Body mass index (BMI) is a number that is calculated from a person's weight and height. BMI may help to estimate how much of a person's weight is composed of fat. BMI can help identify those who may be at higher risk for certain medical problems.  How is BMI used with adults?  BMI is used as a screening tool to identify possible weight problems. It is used to check whether a person is obese, overweight, healthy weight, or underweight.  How is BMI calculated?  BMI measures your weight and compares it to your height. This can be done  "either in English ([place] or metric measurements. Note that charts are available to help you find your BMI quickly and easily without having to do these calculations yourself.  To calculate your BMI in English ([place] measurements, your health care provider will:  1. Measure your weight in pounds (lb).  2. Multiply the number of pounds by 703.  ? For example, for a person who weighs 180 lb, multiply that number by 703, which equals 126,540.  3. Measure your height in inches (in). Then multiply that number by itself to get a measurement called \"inches squared.\"  ? For example, for a person who is 70 in tall, the \"inches squared\" measurement is 70 in x 70 in, which equals 4900 inches squared.  4. Divide the total from Step 2 (number of lb x 703) by the total from Step 3 (inches squared): 126,540 ÷ 4900 = 25.8. This is your BMI.  To calculate your BMI in metric measurements, your health care provider will:  1. Measure your weight in kilograms (kg).  2. Measure your height in meters (m). Then multiply that number by itself to get a measurement called \"meters squared.\"  ? For example, for a person who is 1.75 m tall, the \"meters squared\" measurement is 1.75 m x 1.75 m, which is equal to 3.1 meters squared.  3. Divide the number of kilograms (your weight) by the meters squared number. In this example: 70 ÷ 3.1 = 22.6. This is your BMI.  How is BMI interpreted?  To interpret your results, your health care provider will use BMI charts to identify whether you are underweight, normal weight, overweight, or obese. The following guidelines will be used:  · Underweight: BMI less than 18.5.  · Normal weight: BMI between 18.5 and 24.9.  · Overweight: BMI between 25 and 29.9.  · Obese: BMI of 30 and above.  Please note:  · Weight includes both fat and muscle, so someone with a muscular build, such as an athlete, may have a BMI that is higher than 24.9. In cases like these, BMI is not an accurate measure of body fat.  · To determine " "if excess body fat is the cause of a BMI of 25 or higher, further assessments may need to be done by a health care provider.  · BMI is usually interpreted in the same way for men and women.  Why is BMI a useful tool?  BMI is useful in two ways:  · Identifying a weight problem that may be related to a medical condition, or that may increase the risk for medical problems.  · Promoting lifestyle and diet changes in order to reach a healthy weight.  Summary  · Body mass index (BMI) is a number that is calculated from a person's weight and height.  · BMI may help to estimate how much of a person's weight is composed of fat. BMI can help identify those who may be at higher risk for certain medical problems.  · BMI can be measured using English measurements or metric measurements.  · To interpret your results, your health care provider will use BMI charts to identify whether you are underweight, normal weight, overweight, or obese.  This information is not intended to replace advice given to you by your health care provider. Make sure you discuss any questions you have with your health care provider.  Document Released: 08/29/2005 Document Revised: 10/31/2018 Document Reviewed: 10/31/2018  [employer] Interactive Patient Education © 2019 [employer]    Hypertension  Hypertension, commonly called high blood pressure, is when the force of blood pumping through the arteries is too strong. The arteries are the blood vessels that carry blood from the heart throughout the body. Hypertension forces the heart to work harder to pump blood and may cause arteries to become narrow or stiff. Having untreated or uncontrolled hypertension can cause heart attacks, strokes, kidney disease, and other problems.  A blood pressure reading consists of a higher number over a lower number. Ideally, your blood pressure should be below 120/80. The first (\"top\") number is called the systolic pressure. It is a measure of the pressure in your arteries " "as your heart beats. The second (\"bottom\") number is called the diastolic pressure. It is a measure of the pressure in your arteries as the heart relaxes.  What are the causes?  The cause of this condition is not known.  What increases the risk?  Some risk factors for high blood pressure are under your control. Others are not.  Factors you can change  · Smoking.  · Having type 2 diabetes mellitus, high cholesterol, or both.  · Not getting enough exercise or physical activity.  · Being overweight.  · Having too much fat, sugar, calories, or salt (sodium) in your diet.  · Drinking too much alcohol.  Factors that are difficult or impossible to change  · Having chronic kidney disease.  · Having a family history of high blood pressure.  · Age. Risk increases with age.  · Race. You may be at higher risk if you are -American.  · Gender. Men are at higher risk than women before age 45. After age 65, women are at higher risk than men.  · Having obstructive sleep apnea.  · Stress.  What are the signs or symptoms?  Extremely high blood pressure (hypertensive crisis) may cause:  · Headache.  · Anxiety.  · Shortness of breath.  · Nosebleed.  · Nausea and vomiting.  · Severe chest pain.  · Jerky movements you cannot control (seizures).  How is this diagnosed?  This condition is diagnosed by measuring your blood pressure while you are seated, with your arm resting on a surface. The cuff of the blood pressure monitor will be placed directly against the skin of your upper arm at the level of your heart. It should be measured at least twice using the same arm. Certain conditions can cause a difference in blood pressure between your right and left arms.  Certain factors can cause blood pressure readings to be lower or higher than normal (elevated) for a short period of time:  · When your blood pressure is higher when you are in a health care provider's office than when you are at home, this is called white coat hypertension. " Most people with this condition do not need medicines.  · When your blood pressure is higher at home than when you are in a health care provider's office, this is called masked hypertension. Most people with this condition may need medicines to control blood pressure.  If you have a high blood pressure reading during one visit or you have normal blood pressure with other risk factors:  · You may be asked to return on a different day to have your blood pressure checked again.  · You may be asked to monitor your blood pressure at home for 1 week or longer.  If you are diagnosed with hypertension, you may have other blood or imaging tests to help your health care provider understand your overall risk for other conditions.  How is this treated?  This condition is treated by making healthy lifestyle changes, such as eating healthy foods, exercising more, and reducing your alcohol intake. Your health care provider may prescribe medicine if lifestyle changes are not enough to get your blood pressure under control, and if:  · Your systolic blood pressure is above 130.  · Your diastolic blood pressure is above 80.  Your personal target blood pressure may vary depending on your medical conditions, your age, and other factors.  Follow these instructions at home:  Eating and drinking    · Eat a diet that is high in fiber and potassium, and low in sodium, added sugar, and fat. An example eating plan is called the DASH (Dietary Approaches to Stop Hypertension) diet. To eat this way:  ? Eat plenty of fresh fruits and vegetables. Try to fill half of your plate at each meal with fruits and vegetables.  ? Eat whole grains, such as whole wheat pasta, brown rice, or whole grain bread. Fill about one quarter of your plate with whole grains.  ? Eat or drink low-fat dairy products, such as skim milk or low-fat yogurt.  ? Avoid fatty cuts of meat, processed or cured meats, and poultry with skin. Fill about one quarter of your plate with  lean proteins, such as fish, chicken without skin, beans, eggs, and tofu.  ? Avoid premade and processed foods. These tend to be higher in sodium, added sugar, and fat.  · Reduce your daily sodium intake. Most people with hypertension should eat less than 1,500 mg of sodium a day.  · Limit alcohol intake to no more than 1 drink a day for nonpregnant women and 2 drinks a day for men. One drink equals 12 oz of beer, 5 oz of wine, or 1½ oz of hard liquor.  Lifestyle    · Work with your health care provider to maintain a healthy body weight or to lose weight. Ask what an ideal weight is for you.  · Get at least 30 minutes of exercise that causes your heart to beat faster (aerobic exercise) most days of the week. Activities may include walking, swimming, or biking.  · Include exercise to strengthen your muscles (resistance exercise), such as pilates or lifting weights, as part of your weekly exercise routine. Try to do these types of exercises for 30 minutes at least 3 days a week.  · Do not use any products that contain nicotine or tobacco, such as cigarettes and e-cigarettes. If you need help quitting, ask your health care provider.  · Monitor your blood pressure at home as told by your health care provider.  · Keep all follow-up visits as told by your health care provider. This is important.  Medicines  · Take over-the-counter and prescription medicines only as told by your health care provider. Follow directions carefully. Blood pressure medicines must be taken as prescribed.  · Do not skip doses of blood pressure medicine. Doing this puts you at risk for problems and can make the medicine less effective.  · Ask your health care provider about side effects or reactions to medicines that you should watch for.  Contact a health care provider if:  · You think you are having a reaction to a medicine you are taking.  · You have headaches that keep coming back (recurring).  · You feel dizzy.  · You have swelling in your  ankles.  · You have trouble with your vision.  Get help right away if:  · You develop a severe headache or confusion.  · You have unusual weakness or numbness.  · You feel faint.  · You have severe pain in your chest or abdomen.  · You vomit repeatedly.  · You have trouble breathing.  Summary  · Hypertension is when the force of blood pumping through your arteries is too strong. If this condition is not controlled, it may put you at risk for serious complications.  · Your personal target blood pressure may vary depending on your medical conditions, your age, and other factors. For most people, a normal blood pressure is less than 120/80.  · Hypertension is treated with lifestyle changes, medicines, or a combination of both. Lifestyle changes include weight loss, eating a healthy, low-sodium diet, exercising more, and limiting alcohol.  This information is not intended to replace advice given to you by your health care provider. Make sure you discuss any questions you have with your health care provider.  Document Released: 12/18/2006 Document Revised: 11/15/2017 Document Reviewed: 11/15/2017  [employer] Interactive Patient Education © 2019 [employer]

## 2019-11-13 NOTE — PROGRESS NOTES
Subjective   Renetta Horner is a 59 y.o. female     Chief Complaint   Patient presents with   • Follow-up   • Hypertension       HPI    Problem List:    1. Atrial Fibrillation   1.1 CHADS score 2; patient Pradaxa and Tikosyn  1.2 Event Monitor 4/23-5/3/16 - Afib and Afib with RVR  1.3 Ablation with Dr. Rey 1/18/17  1.4 Cardioversion with Dr. Rey 4/28/17  2. Hypertension  2.1 Echo 4/8/15 - EF > 65%; DD II; trace MR  3. Chest Pain   3.1 Stress Test 4/8/15 - no ischemia   4. Diabetes Mellitus II  5. CKD I  Leticia     Patient is a 59-year-old female who presents today for follow-up with her  at her side.  She denies any chest pain, pressure, palpitations, fluttering, dizziness, presyncope, syncope, orthopnea, PND or edema.  She denies any shortness of breath with activity.  She says she has been having headaches and that her blood pressure has been doing very well.  She says just last week was 126 systolic.  She only thinks her blood pressures up right now because she just does not feel well due to her headache.  PCP monitors her cholesterol.  Denies any signs of bleeding or cerebral ischemic events.    Current Outpatient Medications on File Prior to Visit   Medication Sig Dispense Refill   • CARTIA  MG 24 hr capsule TAKE 1 CAPSULE BY MOUTH ONCE DAILY 90 capsule 3   • docusate sodium (COLACE) 100 MG capsule Take 200 mg by mouth Daily.     • dofetilide (TIKOSYN) 500 MCG capsule Take 1 capsule by mouth Every 12 (Twelve) Hours. 60 capsule 11   • furosemide (LASIX) 20 MG tablet Take 1 tablet by mouth Daily As Needed (edema). 20 tablet 3   • irbesartan (AVAPRO) 300 MG tablet TAKE ONE TABLET BY MOUTH EVERY NIGHT 30 tablet 5   • metFORMIN (GLUCOPHAGE) 500 MG tablet Take 500 mg by mouth Daily With Breakfast.     • omeprazole (priLOSEC) 20 MG capsule Take 20 mg by mouth Daily.  2   • PRADAXA 150 MG capsu TAKE 1 CAPSULE BY MOUTH TWICE DAILY 60 capsule 11   • vitamin D (ERGOCALCIFEROL) 18818 units capsule  capsule Take 50,000 Units by mouth 1 (One) Time Per Week.       No current facility-administered medications on file prior to visit.        ALLERGIES    Atenolol; Indomethacin; Lisinopril; Penicillins; Other; Sulfa antibiotics; Ultram [tramadol]; Xarelto [rivaroxaban]; and Codeine    Past Medical History:   Diagnosis Date   • Arthritis    • Atrial fibrillation (CMS/HCC)     CHADS-VASC = 3   • Atrial fibrillation (CMS/HCC) 5/9/2016    1. Persistent Atrial Fibrillation s/p ECV X2 with IRAF and failed Flecainide     A. Diagnosed in 2015. CHADS-VASc = 3 (DM, HTN, female)      B. Xarelto & ASA stopped due to hemarthrosis of knee.        C. Restarted on Pradaxa and no major issues 150 mg BID     D. Echo 4/8/15 showed EF 65%, moderate, Grade II diastolic dysfunction, mild SERA (LA 3.9cm), normal valves     E. Myocardial Perfusion study 4/8/15: negative for ischemia, EF 68%     • Chest pain    • Diabetes mellitus (CMS/HCC)     on oral agents, Type 2 , Patient states last a1c was 5.9 --DX'D 5 YEARS AGO, CHECKS BLOOD SUGAR DAILY AVERAGE 115-122   • Dizziness    • Fatigue    • GERD (gastroesophageal reflux disease)    • Headache    • Heart murmur    • Hypertension     CONTROLLED WITH MEDS PER PT    • Kidney stone    • Palpitations    • PONV (postoperative nausea and vomiting)    • Wears dentures     UPPER PLATE    • Wears dentures     upper   • Wears eyeglasses     reading       Social History     Socioeconomic History   • Marital status:      Spouse name: Not on file   • Number of children: Not on file   • Years of education: Not on file   • Highest education level: Not on file   Tobacco Use   • Smoking status: Former Smoker     Types: Cigarettes   • Smokeless tobacco: Never Used   • Tobacco comment: QUIT 1997   Substance and Sexual Activity   • Alcohol use: No   • Drug use: No   • Sexual activity: Defer       Family History   Problem Relation Age of Onset   • Coronary artery disease Mother    • Breast cancer Mother   "  • Diabetes Mother    • Other Mother         Benign prostatic hypertrophy       Review of Systems   Constitutional: Negative for diaphoresis and fatigue.   HENT: Negative for congestion, rhinorrhea and sore throat.    Eyes: Positive for visual disturbance (reading glasses).   Respiratory: Negative for chest tightness and shortness of breath.    Cardiovascular: Negative for chest pain (Denies CP ), palpitations and leg swelling.   Gastrointestinal: Negative for abdominal pain, blood in stool, constipation, diarrhea, nausea and vomiting.   Endocrine: Positive for heat intolerance. Negative for cold intolerance.   Genitourinary: Negative for difficulty urinating, dyspareunia, frequency, hematuria and urgency.   Musculoskeletal: Positive for arthralgias. Negative for back pain and neck pain.   Skin: Negative for rash and wound.   Allergic/Immunologic: Positive for food allergies (turkey ). Negative for environmental allergies.   Neurological: Positive for headaches (daily; frontal, behind eyes ). Negative for dizziness, syncope, weakness, light-headedness and numbness.   Hematological: Does not bruise/bleed easily.   Psychiatric/Behavioral: Negative for sleep disturbance.       Objective   /75   Pulse 54   Ht 165.1 cm (65\")   Wt 126 kg (278 lb)   LMP  (LMP Unknown)   SpO2 97%   BMI 46.26 kg/m²   Vitals:    11/13/19 1311 11/13/19 1325   BP: 162/75    Pulse: 54    SpO2: 92% 97%   Weight: 126 kg (278 lb)    Height: 165.1 cm (65\")       Lab Results (most recent)     None        Physical Exam   Constitutional: She is oriented to person, place, and time. Vital signs are normal. She appears well-developed and well-nourished. She is active and cooperative.   HENT:   Head: Normocephalic.   Eyes: Lids are normal.   Neck: Normal carotid pulses, no hepatojugular reflux and no JVD present. Carotid bruit is not present.   Cardiovascular: Regular rhythm and normal heart sounds. Bradycardia present.   Pulses:       Radial " pulses are 2+ on the right side, and 2+ on the left side.        Dorsalis pedis pulses are 2+ on the right side, and 2+ on the left side.        Posterior tibial pulses are 2+ on the right side, and 2+ on the left side.   No edema BLE.   Pulmonary/Chest: Effort normal and breath sounds normal.   Abdominal: Normal appearance and bowel sounds are normal.   Neurological: She is alert and oriented to person, place, and time.   Skin: Skin is warm, dry and intact.   Psychiatric: She has a normal mood and affect. Her speech is normal and behavior is normal. Judgment and thought content normal. Cognition and memory are normal.       Procedure   Procedures         Assessment/Plan      Diagnosis Plan   1. Essential hypertension     2. Palpitations     3. Persistent atrial fibrillation         Return in about 3 months (around 2/13/2020).       Hypertension-patient is doing very well per her report on cardia and irbesartan.  Palpitations/A. fib-patient's doing well on Cartia, Tikosyn and Pradaxa.  She will continue her medication regimen.  She will follow-up in 3 months or sooner if any changes.        Patient's Body mass index is 46.26 kg/m². BMI is above normal parameters. Recommendations include: educational material.      Electronically signed by:

## 2019-11-29 DIAGNOSIS — I48.19 PERSISTENT ATRIAL FIBRILLATION (HCC): ICD-10-CM

## 2019-12-02 RX ORDER — DOFETILIDE 0.5 MG/1
CAPSULE ORAL
Qty: 180 CAPSULE | Refills: 3 | Status: SHIPPED | OUTPATIENT
Start: 2019-12-02 | End: 2020-11-30

## 2020-03-04 ENCOUNTER — OFFICE VISIT (OUTPATIENT)
Dept: CARDIOLOGY | Facility: CLINIC | Age: 60
End: 2020-03-04

## 2020-03-04 VITALS
SYSTOLIC BLOOD PRESSURE: 161 MMHG | HEIGHT: 65 IN | HEART RATE: 74 BPM | WEIGHT: 286.8 LBS | DIASTOLIC BLOOD PRESSURE: 68 MMHG | OXYGEN SATURATION: 96 % | BODY MASS INDEX: 47.78 KG/M2

## 2020-03-04 DIAGNOSIS — R60.9 PERIPHERAL EDEMA: ICD-10-CM

## 2020-03-04 DIAGNOSIS — I48.0 PAROXYSMAL ATRIAL FIBRILLATION (HCC): ICD-10-CM

## 2020-03-04 DIAGNOSIS — I10 ESSENTIAL HYPERTENSION: Primary | ICD-10-CM

## 2020-03-04 DIAGNOSIS — R00.2 PALPITATIONS: ICD-10-CM

## 2020-03-04 PROCEDURE — 99214 OFFICE O/P EST MOD 30 MIN: CPT | Performed by: NURSE PRACTITIONER

## 2020-03-04 RX ORDER — IRBESARTAN 300 MG/1
300 TABLET ORAL
Qty: 30 TABLET | Refills: 11 | Status: SHIPPED | OUTPATIENT
Start: 2020-03-04 | End: 2021-01-20 | Stop reason: SDUPTHER

## 2020-03-04 RX ORDER — DILTIAZEM HYDROCHLORIDE 240 MG/1
240 CAPSULE, COATED, EXTENDED RELEASE ORAL DAILY
Qty: 30 CAPSULE | Refills: 11 | Status: SHIPPED | OUTPATIENT
Start: 2020-03-04 | End: 2021-03-08 | Stop reason: SDUPTHER

## 2020-03-04 RX ORDER — PANTOPRAZOLE SODIUM 40 MG/1
40 TABLET, DELAYED RELEASE ORAL DAILY
COMMUNITY
End: 2021-07-20

## 2020-03-04 NOTE — PATIENT INSTRUCTIONS

## 2020-07-17 ENCOUNTER — OFFICE VISIT (OUTPATIENT)
Dept: CARDIOLOGY | Facility: CLINIC | Age: 60
End: 2020-07-17

## 2020-07-17 VITALS
DIASTOLIC BLOOD PRESSURE: 74 MMHG | HEART RATE: 82 BPM | BODY MASS INDEX: 45.8 KG/M2 | SYSTOLIC BLOOD PRESSURE: 144 MMHG | HEIGHT: 66 IN | OXYGEN SATURATION: 98 % | WEIGHT: 285 LBS

## 2020-07-17 DIAGNOSIS — I10 ESSENTIAL HYPERTENSION: ICD-10-CM

## 2020-07-17 DIAGNOSIS — Z79.899 LONG TERM CURRENT USE OF ANTIARRHYTHMIC MEDICAL THERAPY: ICD-10-CM

## 2020-07-17 DIAGNOSIS — I48.19 PERSISTENT ATRIAL FIBRILLATION (HCC): Primary | ICD-10-CM

## 2020-07-17 PROCEDURE — 93000 ELECTROCARDIOGRAM COMPLETE: CPT | Performed by: INTERNAL MEDICINE

## 2020-07-17 PROCEDURE — 99214 OFFICE O/P EST MOD 30 MIN: CPT | Performed by: INTERNAL MEDICINE

## 2020-07-17 NOTE — PROGRESS NOTES
Renetta Horner  1960  PCP: Gustabo Quach MD    SUBJECTIVE:   Renetta Horner is a 59 y.o. female seen for a follow up visit regarding the following:     Chief Complaint: Follow up for A. Fib    HPI:    Since last visit the patient's status has been stable. Broke her ankle.    History:       Cardiac PMH: (Old records have been reviewed and summarized below)  1. Persistent Atrial Fibrillation s/p ECV X2 with IRAF and failed Flecainide  A. Diagnosed in 2015. CHADS-VASc = 2   B. No a/c secondary to hemarthrosis on Xarelto.   C. Echo show EF of 65%. Stress test negative for ischemia.   D. Restarted on Pradaxa and no major issues 150 mg BID  E. Pulmonary vein ablation/roof line/box in lesion along the posterior wall on 01/18/2017  F. Recurrent Afib now on Tikosyn 500 mcg BID    Current Outpatient Medications:   •  dilTIAZem CD (CARTIA XT) 240 MG 24 hr capsule, Take 1 capsule by mouth Daily., Disp: 30 capsule, Rfl: 11  •  docusate sodium (COLACE) 100 MG capsule, Take 200 mg by mouth Daily., Disp: , Rfl:   •  dofetilide (TIKOSYN) 500 MCG capsule, TAKE 1 CAPSULE BY MOUTH EVERY 12 HOURS, Disp: 180 capsule, Rfl: 3  •  furosemide (LASIX) 20 MG tablet, Take 1 tablet by mouth Daily As Needed (edema)., Disp: 20 tablet, Rfl: 3  •  irbesartan (AVAPRO) 300 MG tablet, Take 1 tablet by mouth every night at bedtime., Disp: 30 tablet, Rfl: 11  •  metFORMIN (GLUCOPHAGE) 500 MG tablet, Take 500 mg by mouth Daily With Breakfast., Disp: , Rfl:   •  pantoprazole (PROTONIX) 40 MG EC tablet, Take 40 mg by mouth Daily., Disp: , Rfl:   •  PRADAXA 150 MG capsu, TAKE 1 CAPSULE BY MOUTH TWICE DAILY, Disp: 60 capsule, Rfl: 11  •  vitamin D (ERGOCALCIFEROL) 80879 units capsule capsule, Take 50,000 Units by mouth 1 (One) Time Per Week., Disp: , Rfl:     Past Medical History, Past Surgical History, Family history, Social History, and Medications were all reviewed with the patient today and updated as necessary.       Patient Active  Problem List   Diagnosis   • Chest pain   • Diabetes mellitus (CMS/[facility])   • Gastroesophageal reflux disease   • Heart murmur   • Hypertension   • Palpitations   • Obesity, Class III, BMI 40-49.9 (morbid obesity) (CMS/[facility])   • Persistent atrial fibrillation (CMS/[facility])   • Long term current use of antiarrhythmic medical therapy     Allergies   Allergen Reactions   • Atenolol Shortness Of Breath     Hard to breath   • Indomethacin Hives and Shortness Of Breath     , HIVES    • Lisinopril Shortness Of Breath   • Penicillins Anaphylaxis   • Codeine Itching and Nausea Only     ITCHING    • Other Itching     [place]-- hives and vomiting   • Sulfa Antibiotics Nausea And Vomiting   • Ultram [Tramadol] Nausea And Vomiting   • Xarelto [Rivaroxaban] Other (See Comments)     Bleeding knee     Past Medical History:   Diagnosis Date   • Arthritis    • Atrial fibrillation (CMS/[facility])     CHADS-VASC = 3   • Atrial fibrillation (CMS/[facility]) 5/9/2016    1. Persistent Atrial Fibrillation s/p ECV X2 with IRAF and failed Flecainide     A. Diagnosed in 2015. CHADS-VASc = 3 (DM, HTN, female)      B. Xarelto & ASA stopped due to hemarthrosis of knee.        C. Restarted on Pradaxa and no major issues 150 mg BID     D. Echo 4/8/15 showed EF 65%, moderate, Grade II diastolic dysfunction, mild SERA (LA 3.9cm), normal valves     E. Myocardial Perfusion study 4/8/15: negative for ischemia, EF 68%     • Chest pain    • Diabetes mellitus (CMS/[facility])     on oral agents, Type 2 , Patient states last a1c was 5.9 --DX'D 5 YEARS AGO, CHECKS BLOOD SUGAR DAILY AVERAGE 115-122   • Dizziness    • Fatigue    • GERD (gastroesophageal reflux disease)    • Headache    • Heart murmur    • Hypertension     CONTROLLED WITH MEDS PER PT    • Kidney stone    • Palpitations    • PONV (postoperative nausea and vomiting)    • Wears dentures     UPPER PLATE    • Wears dentures     upper   • Wears eyeglasses     reading     Past Surgical History:   Procedure Laterality Date   •  "ANKLE SURGERY      right    • CARDIAC ELECTROPHYSIOLOGY PROCEDURE N/A 1/18/2017    Procedure: Schedule for a PVA. Cancel the Tikosyn admission.;  Surgeon: Geronimo Rey DO;  Location: [facility] INVASIVE LOCATION;  Service:    • CHOLECYSTECTOMY     • COLONOSCOPY  2011   • HYSTERECTOMY     • JOINT REPLACEMENT      right knee,    • KNEE SURGERY Right     KNEE REPLACEMENT      Family History   Problem Relation Age of Onset   • Coronary artery disease Mother    • Breast cancer Mother    • Diabetes Mother    • Other Mother         Benign prostatic hypertrophy     Social History     Tobacco Use   • Smoking status: Former Smoker     Types: Cigarettes   • Smokeless tobacco: Never Used   • Tobacco comment: QUIT 1997   Substance Use Topics   • Alcohol use: No         PHYSICAL EXAM:    /74 (BP Location: Left arm, Patient Position: Sitting)   Pulse 82   Ht 167.6 cm (66\")   Wt 129 kg (285 lb)   LMP  (LMP Unknown)   SpO2 98%   BMI 46.00 kg/m²        Wt Readings from Last 5 Encounters:   07/17/20 129 kg (285 lb)   03/04/20 130 kg (286 lb 12.8 oz)   11/13/19 126 kg (278 lb)   10/18/19 122 kg (270 lb)   05/09/19 114 kg (252 lb 3.2 oz)       BP Readings from Last 5 Encounters:   07/17/20 144/74   03/04/20 161/68   11/13/19 162/75   10/18/19 144/70   05/09/19 144/70       General-Well Nourished, Well developed  Eyes - PERRLA  Neck- supple, No mass  CV- regular rate and rhythm, no MRG, No edema  Lung- clear bilaterally  Abd- soft, +BS  Musc/skel - Norm strength and range of motion  Skin- warm and dry  Neuro - Alert & Oriented x 3, appropriate mood.        Medical problems and test results were reviewed with the patient today.     No results found for this or any previous visit (from the past 672 hour(s)).      EKG: (EKG has been independently visualized by me and summarized below)      ECG 12 Lead  Date/Time: 7/17/2020 10:07 AM  Performed by: Mark Johnson MD  Authorized by: Mark Johnson MD   Comparison: " compared with previous ECG   Similar to previous ECG  Rhythm: sinus rhythm  Rate: normal  BPM: 77  Conduction: incomplete right bundle branch block             ASSESSMENT and PLAN  1. Persistent Afib s/p PVA in Jan 2017.  Well controlled on tikosyn  2. HTN - controlled  3. GERD: - on Protonix.   4. Tikosyn use - Stable Qtc of 461msec  5. Obesity - 30 pound gain in 1 year        Return in about 6 months (around 1/17/2021).        Mark Johnson M.D., F.A.C.C, F.H.R.S.  Cardiology/Electrophysiology  7/17/2020  10:09

## 2020-08-05 DIAGNOSIS — I48.0 PAROXYSMAL ATRIAL FIBRILLATION (HCC): ICD-10-CM

## 2020-08-05 RX ORDER — ATENOLOL 100 MG/1
TABLET ORAL
Qty: 180 CAPSULE | Refills: 1 | Status: SHIPPED | OUTPATIENT
Start: 2020-08-05 | End: 2021-02-02

## 2020-08-19 ENCOUNTER — OFFICE VISIT (OUTPATIENT)
Dept: CARDIOLOGY | Facility: CLINIC | Age: 60
End: 2020-08-19

## 2020-08-19 VITALS
BODY MASS INDEX: 47.48 KG/M2 | SYSTOLIC BLOOD PRESSURE: 176 MMHG | WEIGHT: 285 LBS | HEART RATE: 62 BPM | HEIGHT: 65 IN | OXYGEN SATURATION: 97 % | DIASTOLIC BLOOD PRESSURE: 68 MMHG

## 2020-08-19 DIAGNOSIS — I48.19 PERSISTENT ATRIAL FIBRILLATION (HCC): Primary | ICD-10-CM

## 2020-08-19 DIAGNOSIS — I51.89 GRADE II DIASTOLIC DYSFUNCTION: ICD-10-CM

## 2020-08-19 DIAGNOSIS — R00.2 PALPITATIONS: ICD-10-CM

## 2020-08-19 DIAGNOSIS — I10 ESSENTIAL HYPERTENSION: ICD-10-CM

## 2020-08-19 DIAGNOSIS — R60.9 PERIPHERAL EDEMA: ICD-10-CM

## 2020-08-19 PROBLEM — R60.0 PERIPHERAL EDEMA: Status: ACTIVE | Noted: 2020-08-19

## 2020-08-19 PROCEDURE — 99213 OFFICE O/P EST LOW 20 MIN: CPT | Performed by: NURSE PRACTITIONER

## 2020-08-19 RX ORDER — FUROSEMIDE 20 MG/1
20 TABLET ORAL DAILY PRN
Qty: 20 TABLET | Refills: 3 | Status: SHIPPED | OUTPATIENT
Start: 2020-08-19 | End: 2022-04-06 | Stop reason: SDUPTHER

## 2020-08-19 NOTE — PROGRESS NOTES
Subjective   Renetta Horner is a 59 y.o. female     Chief Complaint   Patient presents with   • Follow-up     Here for a 6 month follow up        HPI    Problem List:    1. Atrial Fibrillation   1.1 CHADS score 2; patient Pradaxa and Tikosyn  1.2 Event Monitor 4/23-5/3/16 - Afib and Afib with RVR  1.3 Ablation with Dr. Rey 1/18/17  1.4 Cardioversion with Dr. Rey 4/28/17  2. Hypertension  2.1 Echo 4/8/15 - EF > 65%; DD II; trace MR  3. Chest Pain   3.1 Stress Test 4/8/15 - no ischemia   4. Diabetes Mellitus II  5. CKD I Dr. Kelly     Patient is a 59-year-old female who presents today for a follow-up.  Patient denies any chest pain, pressure, dizziness, presyncope, syncope, orthopnea, PND or edema.  She says she does have occasional flutter.  She says they come and go quickly.  She denies any shortness of breath with activity.  She says she was having some issues with heartburn when they were changing around her medications but she has been doing fine since.  She denies any signs of bleeding or cerebral ischemic events.    PCP monitors her blood work and she brought it with her.  Her LDL was 98 and her hemoglobin A1c was 6.8.    Current Outpatient Medications on File Prior to Visit   Medication Sig Dispense Refill   • dilTIAZem CD (CARTIA XT) 240 MG 24 hr capsule Take 1 capsule by mouth Daily. 30 capsule 11   • docusate sodium (COLACE) 100 MG capsule Take 200 mg by mouth Daily.     • dofetilide (TIKOSYN) 500 MCG capsule TAKE 1 CAPSULE BY MOUTH EVERY 12 HOURS 180 capsule 3   • irbesartan (AVAPRO) 300 MG tablet Take 1 tablet by mouth every night at bedtime. 30 tablet 11   • metFORMIN (GLUCOPHAGE) 500 MG tablet Take 500 mg by mouth Daily With Breakfast.     • pantoprazole (PROTONIX) 40 MG EC tablet Take 40 mg by mouth Daily.     • PRADAXA 150 MG capsu Take 1 capsule by mouth twice daily 180 capsule 1   • vitamin D (ERGOCALCIFEROL) 86718 units capsule capsule Take 50,000 Units by mouth 1 (One) Time Per Week.        No current facility-administered medications on file prior to visit.        ALLERGIES    Atenolol; Indomethacin; Lisinopril; Penicillins; Codeine; Other; Sulfa antibiotics; Ultram [tramadol]; and Xarelto [rivaroxaban]    Past Medical History:   Diagnosis Date   • Arthritis    • Atrial fibrillation (CMS/[facility])     CHADS-VASC = 3   • Atrial fibrillation (CMS/[facility]) 5/9/2016    1. Persistent Atrial Fibrillation s/p ECV X2 with IRAF and failed Flecainide     A. Diagnosed in 2015. CHADS-VASc = 3 (DM, HTN, female)      B. Xarelto & ASA stopped due to hemarthrosis of knee.        C. Restarted on Pradaxa and no major issues 150 mg BID     D. Echo 4/8/15 showed EF 65%, moderate, Grade II diastolic dysfunction, mild SERA (LA 3.9cm), normal valves     E. Myocardial Perfusion study 4/8/15: negative for ischemia, EF 68%     • Chest pain    • Diabetes mellitus (CMS/[facility])     on oral agents, Type 2 , Patient states last a1c was 5.9 --DX'D 5 YEARS AGO, CHECKS BLOOD SUGAR DAILY AVERAGE 115-122   • Dizziness    • Fatigue    • GERD (gastroesophageal reflux disease)    • Headache    • Heart murmur    • Hypertension     CONTROLLED WITH MEDS PER PT    • Kidney stone    • Palpitations    • PONV (postoperative nausea and vomiting)    • Wears dentures     UPPER PLATE    • Wears dentures     upper   • Wears eyeglasses     reading       Social History     Socioeconomic History   • Marital status:      Spouse name: Not on file   • Number of children: Not on file   • Years of education: Not on file   • Highest education level: Not on file   Tobacco Use   • Smoking status: Former Smoker     Types: Cigarettes   • Smokeless tobacco: Never Used   • Tobacco comment: QUIT 1997   Substance and Sexual Activity   • Alcohol use: No   • Drug use: No   • Sexual activity: Defer       Family History   Problem Relation Age of Onset   • Coronary artery disease Mother    • Breast cancer Mother    • Diabetes Mother    • Other Mother         Benign prostatic  "hypertrophy       Review of Systems   Constitutional: Negative for chills, fatigue and fever.   HENT: Negative for congestion, rhinorrhea and sore throat.    Eyes: Positive for visual disturbance (reading glasses ).   Respiratory: Negative for chest tightness and shortness of breath.    Cardiovascular: Positive for palpitations (occasional flutters ). Negative for chest pain and leg swelling.   Gastrointestinal: Negative for abdominal pain, blood in stool, nausea and vomiting.   Endocrine: Negative for cold intolerance and heat intolerance.   Genitourinary: Negative for dysuria, frequency, hematuria and urgency.   Musculoskeletal: Positive for arthralgias (joints ). Negative for back pain and neck pain.   Skin: Negative for rash and wound.   Allergic/Immunologic: Positive for food allergies (turkey ). Negative for environmental allergies.   Neurological: Negative for dizziness, weakness and light-headedness.   Hematological: Does not bruise/bleed easily.   Psychiatric/Behavioral: Negative for sleep disturbance (denies waking with smothering ).       Objective   /68 (BP Location: Left arm, Patient Position: Sitting)   Pulse 62   Ht 165.1 cm (65\")   Wt 129 kg (285 lb)   LMP  (LMP Unknown)   SpO2 97%   BMI 47.43 kg/m²   Vitals:    08/19/20 1000   BP: 176/68   BP Location: Left arm   Patient Position: Sitting   Pulse: 62   SpO2: 97%   Weight: 129 kg (285 lb)   Height: 165.1 cm (65\")      Lab Results (most recent)     None        Physical Exam   Constitutional: She is oriented to person, place, and time. Vital signs are normal. She appears well-developed and well-nourished. She is active and cooperative.   HENT:   Head: Normocephalic.   Eyes: Lids are normal.   Neck: Normal carotid pulses, no hepatojugular reflux and no JVD present. Carotid bruit is not present.   Cardiovascular: Normal rate, regular rhythm and normal heart sounds.   Pulses:       Radial pulses are 2+ on the right side, and 2+ on the left " side.        Dorsalis pedis pulses are 2+ on the right side, and 2+ on the left side.        Posterior tibial pulses are 2+ on the right side, and 2+ on the left side.   Nonpitting left ankle; no edema RLE.   Pulmonary/Chest: Effort normal and breath sounds normal.   Abdominal: Normal appearance and bowel sounds are normal.   Neurological: She is alert and oriented to person, place, and time.   Skin: Skin is warm, dry and intact.   Psychiatric: She has a normal mood and affect. Her speech is normal and behavior is normal. Judgment and thought content normal. Cognition and memory are normal.       Procedure   Procedures         Assessment/Plan      Diagnosis Plan   1. Persistent atrial fibrillation (CMS/HCC)     2. Palpitations     3. Essential hypertension     4. Peripheral edema  furosemide (LASIX) 20 MG tablet   5. Grade II diastolic dysfunction         Return Schedule for some time in November.  Persistent A. fib/palpitations-patient is on diltiazem Tikosyn and Pradaxa.  Hypertension-patient is doing well with diltiazem and irbesartan.  Her blood pressure is high today however she says is excellent at home.  Peripheral edema/diastolic dysfunction 2-patient will continue with her Lasix.  She will continue her medication regimen.  She will follow-up in November or sooner if any changes.         Renetta PRATT Evens  reports that she has quit smoking. Her smoking use included cigarettes. She has never used smokeless tobacco..       Patient's Body mass index is 47.43 kg/m². BMI is above normal parameters. Recommendations include: educational material and referral to primary care.      Electronically signed by:

## 2020-08-19 NOTE — PATIENT INSTRUCTIONS
"Fat and Cholesterol Restricted Eating Plan  Getting too much fat and cholesterol in your diet may cause health problems. Choosing the right foods helps keep your fat and cholesterol at normal levels. This can keep you from getting certain diseases.  Your doctor may recommend an eating plan that includes:  · Total fat: ______% or less of total calories a day.  · Saturated fat: ______% or less of total calories a day.  · Cholesterol: less than _________mg a day.  · Fiber: ______g a day.  What are tips for following this plan?  Meal planning  · At meals, divide your plate into four equal parts:  ? Fill one-half of your plate with vegetables and green salads.  ? Fill one-fourth of your plate with whole grains.  ? Fill one-fourth of your plate with low-fat (lean) protein foods.  · Eat fish that is high in omega-3 fats at least two times a week. This includes mackerel, tuna, sardines, and salmon.  · Eat foods that are high in fiber, such as whole grains, beans, apples, broccoli, carrots, peas, and barley.  General tips    · Work with your doctor to lose weight if you need to.  · Avoid:  ? Foods with added sugar.  ? Fried foods.  ? Foods with partially hydrogenated oils.  · Limit alcohol intake to no more than 1 drink a day for nonpregnant women and 2 drinks a day for men. One drink equals 12 oz of beer, 5 oz of wine, or 1½ oz of hard liquor.  Reading food labels  · Check food labels for:  ? Trans fats.  ? Partially hydrogenated oils.  ? Saturated fat (g) in each serving.  ? Cholesterol (mg) in each serving.  ? Fiber (g) in each serving.  · Choose foods with healthy fats, such as:  ? Monounsaturated fats.  ? Polyunsaturated fats.  ? Omega-3 fats.  · Choose grain products that have whole grains. Look for the word \"whole\" as the first word in the ingredient list.  Cooking  · Cook foods using low-fat methods. These include baking, boiling, grilling, and broiling.  · Eat more home-cooked foods. Eat at restaurants and buffets " less often.  · Avoid cooking using saturated fats, such as butter, cream, palm oil, palm kernel oil, and coconut oil.  Recommended foods    Fruits  · All fresh, canned (in natural juice), or frozen fruits.  Vegetables  · Fresh or frozen vegetables (raw, steamed, roasted, or grilled). Green salads.  Grains  · Whole grains, such as whole wheat or whole grain breads, crackers, cereals, and pasta. Unsweetened oatmeal, bulgur, barley, quinoa, or brown rice. Corn or whole wheat flour tortillas.  Meats and other protein foods  · Ground beef (85% or leaner), grass-fed beef, or beef trimmed of fat. Skinless chicken or turkey. Ground chicken or turkey. Pork trimmed of fat. All fish and seafood. Egg whites. Dried beans, peas, or lentils. Unsalted nuts or seeds. Unsalted canned beans. Nut butters without added sugar or oil.  Dairy  · Low-fat or nonfat dairy products, such as skim or 1% milk, 2% or reduced-fat cheeses, low-fat and fat-free ricotta or cottage cheese, or plain low-fat and nonfat yogurt.  Fats and oils  · Tub margarine without trans fats. Light or reduced-fat mayonnaise and salad dressings. Avocado. Olive, canola, sesame, or safflower oils.  The items listed above may not be a complete list of foods and beverages you can eat. Contact a dietitian for more information.  Foods to avoid  Fruits  · Canned fruit in heavy syrup. Fruit in cream or butter sauce. Fried fruit.  Vegetables  · Vegetables cooked in cheese, cream, or butter sauce. Fried vegetables.  Grains  · White bread. White pasta. White rice. Cornbread. Bagels, pastries, and croissants. Crackers and snack foods that contain trans fat and hydrogenated oils.  Meats and other protein foods  · Fatty cuts of meat. Ribs, chicken wings, merritt, sausage, bologna, salami, chitterlings, fatback, hot dogs, bratwurst, and packaged lunch meats. Liver and organ meats. Whole eggs and egg yolks. Chicken and turkey with skin. Fried meat.  Dairy  · Whole or 2% milk, cream,  half-and-half, and cream cheese. Whole milk cheeses. Whole-fat or sweetened yogurt. Full-fat cheeses. Nondairy creamers and whipped toppings. Processed cheese, cheese spreads, and cheese curds.  Beverages  · Alcohol. Sugar-sweetened drinks such as sodas, lemonade, and fruit drinks.  Fats and oils  · Butter, stick margarine, lard, shortening, ghee, or merritt fat. Coconut, palm kernel, and palm oils.  Sweets and desserts  · Corn syrup, sugars, honey, and molasses. Candy. Jam and jelly. Syrup. Sweetened cereals. Cookies, pies, cakes, donuts, muffins, and ice cream.  The items listed above may not be a complete list of foods and beverages you should avoid. Contact a dietitian for more information.  Summary  · Choosing the right foods helps keep your fat and cholesterol at normal levels. This can keep you from getting certain diseases.  · At meals, fill one-half of your plate with vegetables and green salads.  · Eat high-fiber foods, like whole grains, beans, apples, carrots, peas, and barley.  · Limit added sugar, saturated fats, alcohol, and fried foods.  This information is not intended to replace advice given to you by your health care provider. Make sure you discuss any questions you have with your health care provider.  Document Released: 06/18/2013 Document Revised: 08/21/2019 Document Reviewed: 09/04/2018  Elsevier Patient Education © 2020 [employer]  BMI for Adults    Body mass index (BMI) is a number that is calculated from a person's weight and height. BMI may help to estimate how much of a person's weight is composed of fat. BMI can help identify those who may be at higher risk for certain medical problems.  How is BMI used with adults?  BMI is used as a screening tool to identify possible weight problems. It is used to check whether a person is obese, overweight, healthy weight, or underweight.  How is BMI calculated?  BMI measures your weight and compares it to your height. This can be done either in  "English ([place] or metric measurements. Note that charts are available to help you find your BMI quickly and easily without having to do these calculations yourself.  To calculate your BMI in English ([place] measurements, your health care provider will:  1. Measure your weight in pounds (lb).  2. Multiply the number of pounds by 703.  ? For example, for a person who weighs 180 lb, multiply that number by 703, which equals 126,540.  3. Measure your height in inches (in). Then multiply that number by itself to get a measurement called \"inches squared.\"  ? For example, for a person who is 70 in tall, the \"inches squared\" measurement is 70 in x 70 in, which equals 4900 inches squared.  4. Divide the total from Step 2 (number of lb x 703) by the total from Step 3 (inches squared): 126,540 ÷ 4900 = 25.8. This is your BMI.  To calculate your BMI in metric measurements, your health care provider will:  1. Measure your weight in kilograms (kg).  2. Measure your height in meters (m). Then multiply that number by itself to get a measurement called \"meters squared.\"  ? For example, for a person who is 1.75 m tall, the \"meters squared\" measurement is 1.75 m x 1.75 m, which is equal to 3.1 meters squared.  3. Divide the number of kilograms (your weight) by the meters squared number. In this example: 70 ÷ 3.1 = 22.6. This is your BMI.  How is BMI interpreted?  To interpret your results, your health care provider will use BMI charts to identify whether you are underweight, normal weight, overweight, or obese. The following guidelines will be used:  · Underweight: BMI less than 18.5.  · Normal weight: BMI between 18.5 and 24.9.  · Overweight: BMI between 25 and 29.9.  · Obese: BMI of 30 and above.  Please note:  · Weight includes both fat and muscle, so someone with a muscular build, such as an athlete, may have a BMI that is higher than 24.9. In cases like these, BMI is not an accurate measure of body fat.  · To determine if excess " body fat is the cause of a BMI of 25 or higher, further assessments may need to be done by a health care provider.  · BMI is usually interpreted in the same way for men and women.  Why is BMI a useful tool?  BMI is useful in two ways:  · Identifying a weight problem that may be related to a medical condition, or that may increase the risk for medical problems.  · Promoting lifestyle and diet changes in order to reach a healthy weight.  Summary  · Body mass index (BMI) is a number that is calculated from a person's weight and height.  · BMI may help to estimate how much of a person's weight is composed of fat. BMI can help identify those who may be at higher risk for certain medical problems.  · BMI can be measured using English measurements or metric measurements.  · To interpret your results, your health care provider will use BMI charts to identify whether you are underweight, normal weight, overweight, or obese.  This information is not intended to replace advice given to you by your health care provider. Make sure you discuss any questions you have with your health care provider.  Document Released: 08/29/2005 Document Revised: 11/30/2018 Document Reviewed: 10/31/2018  Elsevier Patient Education © 2020 [employer] Inc[employer]

## 2020-11-28 DIAGNOSIS — I48.19 PERSISTENT ATRIAL FIBRILLATION (HCC): ICD-10-CM

## 2020-11-30 ENCOUNTER — TELEPHONE (OUTPATIENT)
Dept: CARDIOLOGY | Facility: CLINIC | Age: 60
End: 2020-11-30

## 2020-11-30 DIAGNOSIS — I48.19 PERSISTENT ATRIAL FIBRILLATION (HCC): ICD-10-CM

## 2020-11-30 RX ORDER — DOFETILIDE 0.5 MG/1
CAPSULE ORAL
Qty: 180 CAPSULE | Refills: 0 | Status: SHIPPED | OUTPATIENT
Start: 2020-11-30 | End: 2020-11-30 | Stop reason: SDUPTHER

## 2020-11-30 RX ORDER — DOFETILIDE 0.5 MG/1
500 CAPSULE ORAL EVERY 12 HOURS
Qty: 180 CAPSULE | Refills: 1 | Status: SHIPPED | OUTPATIENT
Start: 2020-11-30 | End: 2021-10-06 | Stop reason: SDUPTHER

## 2020-11-30 NOTE — TELEPHONE ENCOUNTER
The PM confirmed with Katelynn and this refill should not have been sent.  Clara Muñoz @ [facility] notified and will cancel.  The pt was notified.

## 2021-01-20 DIAGNOSIS — I10 ESSENTIAL HYPERTENSION: ICD-10-CM

## 2021-01-20 RX ORDER — IRBESARTAN 300 MG/1
300 TABLET ORAL
Qty: 90 TABLET | Refills: 3 | Status: SHIPPED | OUTPATIENT
Start: 2021-01-20 | End: 2022-01-04

## 2021-01-20 NOTE — TELEPHONE ENCOUNTER
Received faxed RF request from [facility] [place] for #90 of Irbesartan 300mg.       Pended RF.

## 2021-02-02 DIAGNOSIS — I48.0 PAROXYSMAL ATRIAL FIBRILLATION (HCC): ICD-10-CM

## 2021-02-02 RX ORDER — ATENOLOL 100 MG/1
TABLET ORAL
Qty: 180 CAPSULE | Refills: 3 | Status: SHIPPED | OUTPATIENT
Start: 2021-02-02 | End: 2022-01-31

## 2021-03-01 ENCOUNTER — LAB (OUTPATIENT)
Dept: CARDIOLOGY | Facility: CLINIC | Age: 61
End: 2021-03-01

## 2021-03-01 ENCOUNTER — OFFICE VISIT (OUTPATIENT)
Dept: CARDIOLOGY | Facility: CLINIC | Age: 61
End: 2021-03-01

## 2021-03-01 VITALS
SYSTOLIC BLOOD PRESSURE: 160 MMHG | DIASTOLIC BLOOD PRESSURE: 78 MMHG | TEMPERATURE: 98 F | WEIGHT: 280 LBS | HEART RATE: 68 BPM | BODY MASS INDEX: 45 KG/M2 | OXYGEN SATURATION: 97 % | HEIGHT: 66 IN

## 2021-03-01 DIAGNOSIS — I48.19 PERSISTENT ATRIAL FIBRILLATION (HCC): ICD-10-CM

## 2021-03-01 DIAGNOSIS — I10 ESSENTIAL HYPERTENSION: ICD-10-CM

## 2021-03-01 DIAGNOSIS — E66.01 OBESITY, CLASS III, BMI 40-49.9 (MORBID OBESITY) (HCC): ICD-10-CM

## 2021-03-01 DIAGNOSIS — R07.2 PRECORDIAL PAIN: Primary | ICD-10-CM

## 2021-03-01 DIAGNOSIS — I51.89 GRADE II DIASTOLIC DYSFUNCTION: ICD-10-CM

## 2021-03-01 DIAGNOSIS — R60.9 PERIPHERAL EDEMA: ICD-10-CM

## 2021-03-01 DIAGNOSIS — Z00.00 HEALTHCARE MAINTENANCE: ICD-10-CM

## 2021-03-01 DIAGNOSIS — R00.2 PALPITATIONS: ICD-10-CM

## 2021-03-01 DIAGNOSIS — R07.2 PRECORDIAL PAIN: ICD-10-CM

## 2021-03-01 PROBLEM — R07.89 CHEST TIGHTNESS: Status: ACTIVE | Noted: 2021-03-01

## 2021-03-01 LAB
ALBUMIN SERPL-MCNC: 4.07 G/DL (ref 3.5–5.2)
ALBUMIN/GLOB SERPL: 1.2 G/DL
ALP SERPL-CCNC: 85 U/L (ref 39–117)
ALT SERPL W P-5'-P-CCNC: 46 U/L (ref 1–33)
ANION GAP SERPL CALCULATED.3IONS-SCNC: 10.5 MMOL/L (ref 5–15)
AST SERPL-CCNC: 58 U/L (ref 1–32)
BILIRUB SERPL-MCNC: 0.3 MG/DL (ref 0–1.2)
BUN SERPL-MCNC: 12 MG/DL (ref 8–23)
BUN/CREAT SERPL: 19.7 (ref 7–25)
CALCIUM SPEC-SCNC: 9.8 MG/DL (ref 8.6–10.5)
CHLORIDE SERPL-SCNC: 100 MMOL/L (ref 98–107)
CHOLEST SERPL-MCNC: 192 MG/DL (ref 0–200)
CO2 SERPL-SCNC: 26.5 MMOL/L (ref 22–29)
CREAT SERPL-MCNC: 0.61 MG/DL (ref 0.57–1)
GFR SERPL CREATININE-BSD FRML MDRD: 99 ML/MIN/1.73
GLOBULIN UR ELPH-MCNC: 3.5 GM/DL
GLUCOSE SERPL-MCNC: 110 MG/DL (ref 65–99)
HDLC SERPL-MCNC: 40 MG/DL (ref 40–60)
LDLC SERPL CALC-MCNC: 117 MG/DL (ref 0–100)
LDLC/HDLC SERPL: 2.81 {RATIO}
MAGNESIUM SERPL-MCNC: 1.8 MG/DL (ref 1.6–2.4)
POTASSIUM SERPL-SCNC: 4.6 MMOL/L (ref 3.5–5.2)
PROT SERPL-MCNC: 7.6 G/DL (ref 6–8.5)
SODIUM SERPL-SCNC: 137 MMOL/L (ref 136–145)
T4 FREE SERPL-MCNC: 1.28 NG/DL (ref 0.93–1.7)
TRIGL SERPL-MCNC: 199 MG/DL (ref 0–150)
TSH SERPL DL<=0.05 MIU/L-ACNC: 1.85 UIU/ML (ref 0.27–4.2)
VLDLC SERPL-MCNC: 35 MG/DL (ref 5–40)

## 2021-03-01 PROCEDURE — 93000 ELECTROCARDIOGRAM COMPLETE: CPT | Performed by: NURSE PRACTITIONER

## 2021-03-01 PROCEDURE — 80061 LIPID PANEL: CPT | Performed by: NURSE PRACTITIONER

## 2021-03-01 PROCEDURE — 83735 ASSAY OF MAGNESIUM: CPT | Performed by: NURSE PRACTITIONER

## 2021-03-01 PROCEDURE — 99214 OFFICE O/P EST MOD 30 MIN: CPT | Performed by: NURSE PRACTITIONER

## 2021-03-01 PROCEDURE — 36415 COLL VENOUS BLD VENIPUNCTURE: CPT

## 2021-03-01 PROCEDURE — 80053 COMPREHEN METABOLIC PANEL: CPT | Performed by: NURSE PRACTITIONER

## 2021-03-01 PROCEDURE — 86677 HELICOBACTER PYLORI ANTIBODY: CPT | Performed by: NURSE PRACTITIONER

## 2021-03-01 PROCEDURE — 84481 FREE ASSAY (FT-3): CPT | Performed by: NURSE PRACTITIONER

## 2021-03-01 PROCEDURE — 84443 ASSAY THYROID STIM HORMONE: CPT | Performed by: NURSE PRACTITIONER

## 2021-03-01 PROCEDURE — 84439 ASSAY OF FREE THYROXINE: CPT | Performed by: NURSE PRACTITIONER

## 2021-03-01 RX ORDER — NITROGLYCERIN 0.4 MG/1
TABLET SUBLINGUAL
Qty: 30 TABLET | Refills: 5 | Status: SHIPPED | OUTPATIENT
Start: 2021-03-01 | End: 2021-05-21

## 2021-03-01 NOTE — PATIENT INSTRUCTIONS
"BMI for Adults  What is BMI?  Body mass index (BMI) is a number that is calculated from a person's weight and height. BMI can help estimate how much of a person's weight is composed of fat. BMI does not measure body fat directly. Rather, it is an alternative to procedures that directly measure body fat, which can be difficult and expensive.  BMI can help identify people who may be at higher risk for certain medical problems.  What are BMI measurements used for?  BMI is used as a screening tool to identify possible weight problems. It helps determine whether a person is obese, overweight, a healthy weight, or underweight.  BMI is useful for:  · Identifying a weight problem that may be related to a medical condition or may increase the risk for medical problems.  · Promoting changes, such as changes in diet and exercise, to help reach a healthy weight. BMI screening can be repeated to see if these changes are working.  How is BMI calculated?  BMI involves measuring your weight in relation to your height. Both height and weight are measured, and the BMI is calculated from those numbers. This can be done either in English ([place] or metric measurements. Note that charts and online BMI calculators are available to help you find your BMI quickly and easily without having to do these calculations yourself.  To calculate your BMI in English ([place] measurements:    1. Measure your weight in pounds (lb).  2. Multiply the number of pounds by 703.  ? For example, for a person who weighs 180 lb, multiply that number by 703, which equals 126,540.  3. Measure your height in inches. Then multiply that number by itself to get a measurement called \"inches squared.\"  ? For example, for a person who is 70 inches tall, the \"inches squared\" measurement is 70 inches x 70 inches, which equals 4,900 inches squared.  4. Divide the total from step 2 (number of lb x 703) by the total from step 3 (inches squared): 126,540 ÷ 4,900 = 25.8. This is " "your BMI.  To calculate your BMI in metric measurements:  1. Measure your weight in kilograms (kg).  2. Measure your height in meters (m). Then multiply that number by itself to get a measurement called \"meters squared.\"  ? For example, for a person who is 1.75 m tall, the \"meters squared\" measurement is 1.75 m x 1.75 m, which is equal to 3.1 meters squared.  3. Divide the number of kilograms (your weight) by the meters squared number. In this example: 70 ÷ 3.1 = 22.6. This is your BMI.  What do the results mean?  BMI charts are used to identify whether you are underweight, normal weight, overweight, or obese. The following guidelines will be used:  · Underweight: BMI less than 18.5.  · Normal weight: BMI between 18.5 and 24.9.  · Overweight: BMI between 25 and 29.9.  · Obese: BMI of 30 or above.  Keep these notes in mind:  · Weight includes both fat and muscle, so someone with a muscular build, such as an athlete, may have a BMI that is higher than 24.9. In cases like these, BMI is not an accurate measure of body fat.  · To determine if excess body fat is the cause of a BMI of 25 or higher, further assessments may need to be done by a health care provider.  · BMI is usually interpreted in the same way for men and women.  Where to find more information  For more information about BMI, including tools to quickly calculate your BMI, go to these websites:  · Centers for Disease Control and Prevention: www.cdc.gov  · American Heart Association: www.heart.org  · National Heart, Lung, and [facility] [place]: www.nhlbi.nih.gov  Summary  · Body mass index (BMI) is a number that is calculated from a person's weight and height.  · BMI may help estimate how much of a person's weight is composed of fat. BMI can help identify those who may be at higher risk for certain medical problems.  · BMI can be measured using English measurements or metric measurements.  · BMI charts are used to identify whether you are underweight, normal " weight, overweight, or obese.  This information is not intended to replace advice given to you by your health care provider. Make sure you discuss any questions you have with your health care provider.  Document Revised: 09/09/2020 Document Reviewed: 07/17/2020  Elsevier Patient Education © 2020 [employer]

## 2021-03-01 NOTE — PROGRESS NOTES
Subjective   Renetta Horner is a 60 y.o. female     Chief Complaint   Patient presents with   • Follow-up   • Atrial Fibrillation       HPI    Problem List:    1. Atrial Fibrillation   1.1 CHADS score 2; patient Pradaxa and Tikosyn  1.2 Event Monitor 4/23-5/3/16 - Afib and Afib with RVR  1.3 Ablation with Dr. Rey 1/18/17  1.4 Cardioversion with Dr. Rey 4/28/17  2. Hypertension  2.1 Echo 4/8/15 - EF > 65%; DD II; trace MR  3. Chest Pain   3.1 Stress Test 4/8/15 - no ischemia   4. Diabetes Mellitus II  5. CKD I Dr. Kelly     Patient is a 60-year-old female who presents today for a follow-up.  Patient says since about November she has been having what she describes as burning across her chest.  She notices it mostly during the day but it is not all the time.  She says it does not radiate and she does not have any other symptoms.  She says but it can occur anytime during the day.  Is not directly related to food intake.  She denies any palpitations, fluttering, dizziness, presyncope, syncope, orthopnea, PND or edema.  She denies any shortness of breath with activity.  She said she had her first episode she did go to Dr. Quach and he did a EKG but did not do any cardiac markers.  Patient says she monitors her blood pressure at home and it is always in the 130s systolic.  She denies any signs of bleeding or cerebral ischemic events.    Current Outpatient Medications on File Prior to Visit   Medication Sig Dispense Refill   • dilTIAZem CD (CARTIA XT) 240 MG 24 hr capsule Take 1 capsule by mouth Daily. 30 capsule 11   • docusate sodium (COLACE) 100 MG capsule Take 200 mg by mouth Daily.     • dofetilide (TIKOSYN) 500 MCG capsule Take 1 capsule by mouth Every 12 (Twelve) Hours. 180 capsule 1   • furosemide (LASIX) 20 MG tablet Take 1 tablet by mouth Daily As Needed (edema). 20 tablet 3   • irbesartan (AVAPRO) 300 MG tablet Take 1 tablet by mouth every night at bedtime. 90 tablet 3   • metFORMIN (GLUCOPHAGE) 500  MG tablet Take 500 mg by mouth Daily With Breakfast.     • pantoprazole (PROTONIX) 40 MG EC tablet Take 40 mg by mouth Daily.     • Pradaxa 150 MG capsu Take 1 capsule by mouth twice daily 180 capsule 3   • vitamin D (ERGOCALCIFEROL) 72016 units capsule capsule Take 50,000 Units by mouth 1 (One) Time Per Week.       No current facility-administered medications on file prior to visit.        ALLERGIES    Atenolol, Indomethacin, Lisinopril, Penicillins, Codeine, Other, Sulfa antibiotics, Ultram [tramadol], and Xarelto [rivaroxaban]    Past Medical History:   Diagnosis Date   • Arthritis    • Atrial fibrillation (CMS/HCC)     CHADS-VASC = 3   • Atrial fibrillation (CMS/HCC) 5/9/2016    1. Persistent Atrial Fibrillation s/p ECV X2 with IRAF and failed Flecainide     A. Diagnosed in 2015. CHADS-VASc = 3 (DM, HTN, female)      B. Xarelto & ASA stopped due to hemarthrosis of knee.        C. Restarted on Pradaxa and no major issues 150 mg BID     D. Echo 4/8/15 showed EF 65%, moderate, Grade II diastolic dysfunction, mild SERA (LA 3.9cm), normal valves     E. Myocardial Perfusion study 4/8/15: negative for ischemia, EF 68%     • Chest pain    • Diabetes mellitus (CMS/HCC)     on oral agents, Type 2 , Patient states last a1c was 5.9 --DX'D 5 YEARS AGO, CHECKS BLOOD SUGAR DAILY AVERAGE 115-122   • Dizziness    • Fatigue    • GERD (gastroesophageal reflux disease)    • Headache    • Heart murmur    • Hypertension     CONTROLLED WITH MEDS PER PT    • Kidney stone    • Palpitations    • PONV (postoperative nausea and vomiting)    • Wears dentures     UPPER PLATE    • Wears dentures     upper   • Wears eyeglasses     reading       Social History     Socioeconomic History   • Marital status:      Spouse name: Not on file   • Number of children: Not on file   • Years of education: Not on file   • Highest education level: Not on file   Tobacco Use   • Smoking status: Former Smoker     Types: Cigarettes   • Smokeless tobacco:  "Never Used   • Tobacco comment: QUIT 1997   Substance and Sexual Activity   • Alcohol use: No   • Drug use: No   • Sexual activity: Defer       Family History   Problem Relation Age of Onset   • Coronary artery disease Mother    • Breast cancer Mother    • Diabetes Mother    • Other Mother         Benign prostatic hypertrophy       Review of Systems   Constitutional: Negative for appetite change, chills, fatigue and fever.   HENT: Negative for congestion, rhinorrhea and sore throat.    Eyes: Positive for visual disturbance (reading glasses).   Respiratory: Positive for chest tightness (burning in her chest ). Negative for cough, shortness of breath and wheezing.    Cardiovascular: Positive for chest pain (burning across chest, notices mostly during day, but not all of the time, started after Nov; no rad and no other symptoms ). Negative for palpitations and leg swelling.   Gastrointestinal: Negative for abdominal pain, blood in stool, constipation, diarrhea, nausea and vomiting.   Endocrine: Positive for heat intolerance (hot flashes ). Negative for cold intolerance.   Genitourinary: Negative for difficulty urinating, dysuria, frequency, hematuria and urgency.   Musculoskeletal: Negative for back pain, joint swelling, neck pain and neck stiffness.   Skin: Negative for rash and wound.   Allergic/Immunologic: Positive for food allergies (turkey). Negative for environmental allergies.   Neurological: Positive for headaches (in the front of her head ). Negative for dizziness, weakness, light-headedness and numbness.   Hematological: Does not bruise/bleed easily.   Psychiatric/Behavioral: Negative for sleep disturbance.       Objective   /78   Pulse 68   Temp 98 °F (36.7 °C)   Ht 167.6 cm (66\")   Wt 127 kg (280 lb)   LMP  (LMP Unknown)   SpO2 97%   BMI 45.19 kg/m²   Vitals:    03/01/21 1045 03/01/21 1057   BP: 176/76 160/78   Pulse: 68    Temp: 98 °F (36.7 °C)    SpO2: 97%    Weight: 127 kg (280 lb)    " "  Height: 167.6 cm (66\")       Lab Results (most recent)     None        Physical Exam  Vitals signs reviewed.   Constitutional:       General: She is awake.      Appearance: Normal appearance. She is well-developed and well-groomed. She is morbidly obese.   HENT:      Head: Normocephalic.   Eyes:      General: Lids are normal.   Neck:      Vascular: No carotid bruit, hepatojugular reflux or JVD.   Cardiovascular:      Rate and Rhythm: Normal rate.      Pulses:           Radial pulses are 2+ on the right side and 2+ on the left side.        Dorsalis pedis pulses are 2+ on the right side and 2+ on the left side.        Posterior tibial pulses are 2+ on the right side and 2+ on the left side.      Heart sounds: Normal heart sounds.   Pulmonary:      Effort: Pulmonary effort is normal.      Breath sounds: Normal breath sounds and air entry.   Abdominal:      General: Bowel sounds are normal.      Palpations: Abdomen is soft.   Musculoskeletal:      Right lower leg: Edema (trace - 1+ ) present.      Left lower leg: Edema (trace - 1 + edema ) present.   Skin:     General: Skin is warm and dry.   Neurological:      Mental Status: She is alert and oriented to person, place, and time.   Psychiatric:         Attention and Perception: Attention and perception normal.         Mood and Affect: Mood and affect normal.         Speech: Speech normal.         Behavior: Behavior normal. Behavior is cooperative.         Thought Content: Thought content normal.         Cognition and Memory: Cognition and memory normal.         Judgment: Judgment normal.         Procedure     ECG 12 Lead    Date/Time: 3/1/2021 11:11 AM  Performed by: Katelynn Smith APRN  Authorized by: Katelynn Smith APRN   Comparison: compared with previous ECG from 7/17/2020  Rhythm: sinus rhythm and sinus arrhythmia  Rate: normal  BPM: 68  Conduction: incomplete right bundle branch block  QRS axis: left  Other findings: low voltage    Clinical impression: " abnormal EKG  Comments: QT/QTc 417/434                 Assessment/Plan      Diagnosis Plan   1. Precordial pain  Helicobacter Pylori, IgA IgG IgM    Stress Test With Myocardial Perfusion One Day    Adult Transthoracic Echo Complete W/ Cont if Necessary Per Protocol    nitroglycerin (NITROSTAT) 0.4 MG SL tablet   2. Persistent atrial fibrillation (CMS/HCC)  TSH    T3, Free    T4, Free    Magnesium    Stress Test With Myocardial Perfusion One Day    Adult Transthoracic Echo Complete W/ Cont if Necessary Per Protocol   3. Palpitations  ECG 12 Lead    TSH    T3, Free    T4, Free    Magnesium    Stress Test With Myocardial Perfusion One Day    Adult Transthoracic Echo Complete W/ Cont if Necessary Per Protocol   4. Essential hypertension  ECG 12 Lead    Comprehensive Metabolic Panel    Lipid Panel    Stress Test With Myocardial Perfusion One Day    Adult Transthoracic Echo Complete W/ Cont if Necessary Per Protocol   5. Grade II diastolic dysfunction  Adult Transthoracic Echo Complete W/ Cont if Necessary Per Protocol   6. Peripheral edema     7. Healthcare maintenance  Comprehensive Metabolic Panel    Lipid Panel    TSH    T3, Free    T4, Free    Magnesium   8. Obesity, Class III, BMI 40-49.9 (morbid obesity) (CMS/HCC)         Return in about 12 weeks (around 5/24/2021).    Chest pain/A. fib/palpitations/hypertension/diastolic function-patient will have an ischemia work-up, stress and echo.  She will get H. pylori, TSH, free T3, free T4, CMP and magnesium as well as lipid today.  She has not had an episode of chest pain for a week so therefore not going to cardiac markers.  She will use nitro as needed for chest pain no resolution she will go to the ER.  She will continue her medication regimen otherwise.  She will follow-up in 12 weeks or sooner if any changes or abnormalities with testing.  Again patient said blood pressure at home is normally in the 130 systolic therefore I will continue her diltiazem and irbesartan  as is for now.  She did have some swelling so I encouraged her to use lasix for at least one day. Morbid Obesity/BMI > 40 - patient provided educational material.        Renetta TERENCE Horner  reports that she has quit smoking. Her smoking use included cigarettes. She has never used smokeless tobacco..      Patient's Body mass index is 45.19 kg/m². BMI is above normal parameters. Recommendations include: educational material.      Electronically signed by:

## 2021-03-02 ENCOUNTER — TELEPHONE (OUTPATIENT)
Dept: CARDIOLOGY | Facility: CLINIC | Age: 61
End: 2021-03-02

## 2021-03-02 DIAGNOSIS — E11.9 TYPE 2 DIABETES MELLITUS WITHOUT COMPLICATION, WITHOUT LONG-TERM CURRENT USE OF INSULIN (HCC): ICD-10-CM

## 2021-03-02 DIAGNOSIS — R60.9 PERIPHERAL EDEMA: ICD-10-CM

## 2021-03-02 DIAGNOSIS — R07.9 CHEST PAIN, UNSPECIFIED TYPE: Primary | ICD-10-CM

## 2021-03-02 DIAGNOSIS — I10 ESSENTIAL HYPERTENSION: ICD-10-CM

## 2021-03-02 LAB — T3FREE SERPL-MCNC: 3.14 PG/ML (ref 2–4.4)

## 2021-03-02 NOTE — TELEPHONE ENCOUNTER
----- Message from SUSANA Kmuar sent at 3/2/2021  6:36 AM EST -----  Please advise patient.  LFTs slightly elevated, been taking tylenol products?  Also her bad cholesterol, LDL is slightly elevated, monitor red meat and fried food intake.  Forwarded labs to PCP. Repeat CMP in 2 weeks.      Pt was advised of lab results and she will come back here in 2 weeks for repeat of CMP     AMY Singh

## 2021-03-03 ENCOUNTER — HOSPITAL ENCOUNTER (OUTPATIENT)
Dept: CARDIOLOGY | Facility: HOSPITAL | Age: 61
Discharge: HOME | End: 2021-03-03

## 2021-03-03 ENCOUNTER — TELEPHONE (OUTPATIENT)
Dept: CARDIOLOGY | Facility: CLINIC | Age: 61
End: 2021-03-03

## 2021-03-03 DIAGNOSIS — R00.2 PALPITATIONS: ICD-10-CM

## 2021-03-03 DIAGNOSIS — I51.89 GRADE II DIASTOLIC DYSFUNCTION: ICD-10-CM

## 2021-03-03 DIAGNOSIS — I48.19 PERSISTENT ATRIAL FIBRILLATION (HCC): ICD-10-CM

## 2021-03-03 DIAGNOSIS — R07.2 PRECORDIAL PAIN: ICD-10-CM

## 2021-03-03 DIAGNOSIS — I10 ESSENTIAL HYPERTENSION: ICD-10-CM

## 2021-03-03 LAB
H PYLORI IGA SER-ACNC: <9 UNITS (ref 0–8.9)
H PYLORI IGG SER IA-ACNC: 0.27 INDEX VALUE (ref 0–0.79)
H PYLORI IGM SER-ACNC: <9 UNITS (ref 0–8.9)

## 2021-03-03 PROCEDURE — 93017 CV STRESS TEST TRACING ONLY: CPT

## 2021-03-03 PROCEDURE — 93306 TTE W/DOPPLER COMPLETE: CPT | Performed by: INTERNAL MEDICINE

## 2021-03-03 PROCEDURE — 78452 HT MUSCLE IMAGE SPECT MULT: CPT

## 2021-03-03 PROCEDURE — 93018 CV STRESS TEST I&R ONLY: CPT | Performed by: INTERNAL MEDICINE

## 2021-03-03 PROCEDURE — 0 TECHNETIUM SESTAMIBI: Performed by: INTERNAL MEDICINE

## 2021-03-03 PROCEDURE — A9500 TC99M SESTAMIBI: HCPCS | Performed by: INTERNAL MEDICINE

## 2021-03-03 PROCEDURE — 93306 TTE W/DOPPLER COMPLETE: CPT

## 2021-03-03 PROCEDURE — 25010000002 REGADENOSON 0.4 MG/5ML SOLUTION: Performed by: INTERNAL MEDICINE

## 2021-03-03 PROCEDURE — 78452 HT MUSCLE IMAGE SPECT MULT: CPT | Performed by: INTERNAL MEDICINE

## 2021-03-03 RX ADMIN — REGADENOSON 0.4 MG: 0.08 INJECTION, SOLUTION INTRAVENOUS at 09:20

## 2021-03-03 RX ADMIN — TECHNETIUM TC 99M SESTAMIBI 1 DOSE: 1 INJECTION INTRAVENOUS at 09:19

## 2021-03-03 RX ADMIN — TECHNETIUM TC 99M SESTAMIBI 1 DOSE: 1 INJECTION INTRAVENOUS at 09:20

## 2021-03-03 NOTE — TELEPHONE ENCOUNTER
----- Message from SUSANA Kumar sent at 3/3/2021  1:18 PM EST -----  Please advise patient.    Pt was advised of lab results     H. pylori, IgA ABS   0.0 - 8.9 units <9.0    Comment:                                 Negative

## 2021-03-08 ENCOUNTER — LAB (OUTPATIENT)
Dept: CARDIOLOGY | Facility: CLINIC | Age: 61
End: 2021-03-08

## 2021-03-08 ENCOUNTER — OFFICE VISIT (OUTPATIENT)
Dept: CARDIOLOGY | Facility: CLINIC | Age: 61
End: 2021-03-08

## 2021-03-08 VITALS
OXYGEN SATURATION: 97 % | HEIGHT: 66 IN | WEIGHT: 283 LBS | BODY MASS INDEX: 45.48 KG/M2 | HEART RATE: 68 BPM | DIASTOLIC BLOOD PRESSURE: 72 MMHG | TEMPERATURE: 97.6 F | SYSTOLIC BLOOD PRESSURE: 184 MMHG

## 2021-03-08 DIAGNOSIS — I51.89 GRADE II DIASTOLIC DYSFUNCTION: ICD-10-CM

## 2021-03-08 DIAGNOSIS — E66.01 OBESITY, CLASS III, BMI 40-49.9 (MORBID OBESITY) (HCC): ICD-10-CM

## 2021-03-08 DIAGNOSIS — R07.89 OTHER CHEST PAIN: ICD-10-CM

## 2021-03-08 DIAGNOSIS — R60.9 PERIPHERAL EDEMA: ICD-10-CM

## 2021-03-08 DIAGNOSIS — Z01.818 PRE-OP TESTING: ICD-10-CM

## 2021-03-08 DIAGNOSIS — I48.19 PERSISTENT ATRIAL FIBRILLATION (HCC): ICD-10-CM

## 2021-03-08 DIAGNOSIS — I10 ESSENTIAL HYPERTENSION: ICD-10-CM

## 2021-03-08 DIAGNOSIS — I48.0 PAROXYSMAL ATRIAL FIBRILLATION (HCC): ICD-10-CM

## 2021-03-08 DIAGNOSIS — R94.39 ABNORMAL STRESS TEST: Primary | ICD-10-CM

## 2021-03-08 DIAGNOSIS — E11.9 TYPE 2 DIABETES MELLITUS WITHOUT COMPLICATION, WITHOUT LONG-TERM CURRENT USE OF INSULIN (HCC): ICD-10-CM

## 2021-03-08 DIAGNOSIS — R07.9 CHEST PAIN, UNSPECIFIED TYPE: ICD-10-CM

## 2021-03-08 DIAGNOSIS — Z82.49 FAMILY HISTORY OF EARLY CAD: ICD-10-CM

## 2021-03-08 DIAGNOSIS — E78.2 MIXED HYPERLIPIDEMIA: ICD-10-CM

## 2021-03-08 LAB
BH CV STRESS COMMENTS STAGE 1: NORMAL
BH CV STRESS DOSE REGADENOSON STAGE 1: 0.4
BH CV STRESS DURATION MIN STAGE 1: 0
BH CV STRESS DURATION SEC STAGE 1: 10
BH CV STRESS PROTOCOL 1: NORMAL
BH CV STRESS RECOVERY BP: NORMAL MMHG
BH CV STRESS RECOVERY HR: 78 BPM
BH CV STRESS STAGE 1: 1
MAXIMAL PREDICTED HEART RATE: 158 BPM
PERCENT MAX PREDICTED HR: 62.66 %
STRESS BASELINE BP: NORMAL MMHG
STRESS BASELINE HR: 74 BPM
STRESS PERCENT HR: 74 %
STRESS POST PEAK BP: NORMAL MMHG
STRESS POST PEAK HR: 99 BPM
STRESS TARGET HR: 134 BPM

## 2021-03-08 PROCEDURE — 99214 OFFICE O/P EST MOD 30 MIN: CPT | Performed by: NURSE PRACTITIONER

## 2021-03-08 RX ORDER — DILTIAZEM HYDROCHLORIDE 240 MG/1
240 CAPSULE, COATED, EXTENDED RELEASE ORAL DAILY
Qty: 90 CAPSULE | Refills: 3 | Status: SHIPPED | OUTPATIENT
Start: 2021-03-08 | End: 2022-04-04

## 2021-03-08 RX ORDER — POTASSIUM CHLORIDE 750 MG/1
10 TABLET, FILM COATED, EXTENDED RELEASE ORAL DAILY PRN
Qty: 90 TABLET | Refills: 3 | Status: SHIPPED | OUTPATIENT
Start: 2021-03-08

## 2021-03-08 RX ORDER — CLOPIDOGREL BISULFATE 75 MG/1
75 TABLET ORAL DAILY
Qty: 30 TABLET | Refills: 11 | Status: SHIPPED | OUTPATIENT
Start: 2021-03-08 | End: 2021-04-26

## 2021-03-08 NOTE — PATIENT INSTRUCTIONS
"BMI for Adults  What is BMI?  Body mass index (BMI) is a number that is calculated from a person's weight and height. BMI can help estimate how much of a person's weight is composed of fat. BMI does not measure body fat directly. Rather, it is an alternative to procedures that directly measure body fat, which can be difficult and expensive.  BMI can help identify people who may be at higher risk for certain medical problems.  What are BMI measurements used for?  BMI is used as a screening tool to identify possible weight problems. It helps determine whether a person is obese, overweight, a healthy weight, or underweight.  BMI is useful for:  · Identifying a weight problem that may be related to a medical condition or may increase the risk for medical problems.  · Promoting changes, such as changes in diet and exercise, to help reach a healthy weight. BMI screening can be repeated to see if these changes are working.  How is BMI calculated?  BMI involves measuring your weight in relation to your height. Both height and weight are measured, and the BMI is calculated from those numbers. This can be done either in English ([place] or metric measurements. Note that charts and online BMI calculators are available to help you find your BMI quickly and easily without having to do these calculations yourself.  To calculate your BMI in English ([place] measurements:    1. Measure your weight in pounds (lb).  2. Multiply the number of pounds by 703.  ? For example, for a person who weighs 180 lb, multiply that number by 703, which equals 126,540.  3. Measure your height in inches. Then multiply that number by itself to get a measurement called \"inches squared.\"  ? For example, for a person who is 70 inches tall, the \"inches squared\" measurement is 70 inches x 70 inches, which equals 4,900 inches squared.  4. Divide the total from step 2 (number of lb x 703) by the total from step 3 (inches squared): 126,540 ÷ 4,900 = 25.8. This is " "your BMI.  To calculate your BMI in metric measurements:  1. Measure your weight in kilograms (kg).  2. Measure your height in meters (m). Then multiply that number by itself to get a measurement called \"meters squared.\"  ? For example, for a person who is 1.75 m tall, the \"meters squared\" measurement is 1.75 m x 1.75 m, which is equal to 3.1 meters squared.  3. Divide the number of kilograms (your weight) by the meters squared number. In this example: 70 ÷ 3.1 = 22.6. This is your BMI.  What do the results mean?  BMI charts are used to identify whether you are underweight, normal weight, overweight, or obese. The following guidelines will be used:  · Underweight: BMI less than 18.5.  · Normal weight: BMI between 18.5 and 24.9.  · Overweight: BMI between 25 and 29.9.  · Obese: BMI of 30 or above.  Keep these notes in mind:  · Weight includes both fat and muscle, so someone with a muscular build, such as an athlete, may have a BMI that is higher than 24.9. In cases like these, BMI is not an accurate measure of body fat.  · To determine if excess body fat is the cause of a BMI of 25 or higher, further assessments may need to be done by a health care provider.  · BMI is usually interpreted in the same way for men and women.  Where to find more information  For more information about BMI, including tools to quickly calculate your BMI, go to these websites:  · Centers for Disease Control and Prevention: www.cdc.gov  · American Heart Association: www.heart.org  · National Heart, Lung, and [facility] [place]: www.nhlbi.nih.gov  Summary  · Body mass index (BMI) is a number that is calculated from a person's weight and height.  · BMI may help estimate how much of a person's weight is composed of fat. BMI can help identify those who may be at higher risk for certain medical problems.  · BMI can be measured using English measurements or metric measurements.  · BMI charts are used to identify whether you are underweight, normal " weight, overweight, or obese.  This information is not intended to replace advice given to you by your health care provider. Make sure you discuss any questions you have with your health care provider.  Document Revised: 09/09/2020 Document Reviewed: 07/17/2020  Elsevier Patient Education © 2020 [employer]    Coronary Angiogram With Stent  Coronary angiogram with stent placement is a procedure to widen or open a narrow blood vessel of the heart (coronary artery). Arteries may become blocked by cholesterol buildup (plaques) in the lining of the artery wall. When a coronary artery becomes partially blocked, blood flow to that area decreases. This may lead to chest pain or a heart attack (myocardial infarction).  A stent is a small piece of metal that looks like mesh or spring. Stent placement may be done as treatment after a heart attack, or to prevent a heart attack if a blocked artery is found by a coronary angiogram.  Let your health care provider know about:  · Any allergies you have, including allergies to medicines or contrast dye.  · All medicines you are taking, including vitamins, herbs, eye drops, creams, and over-the-counter medicines.  · Any problems you or family members have had with anesthetic medicines.  · Any blood disorders you have.  · Any surgeries you have had.  · Any medical conditions you have, including kidney problems or kidney failure.  · Whether you are pregnant or may be pregnant.  · Whether you are breastfeeding.  What are the risks?  Generally, this is a safe procedure. However, serious problems may occur, including:  · Damage to nearby structures or organs, such as the heart, blood vessels, or kidneys.  · A return of blockage.  · Bleeding, infection, or bruising at the insertion site.  · A collection of blood under the skin (hematoma) at the insertion site.  · A blood clot in another part of the body.  · Allergic reaction to medicines or dyes.  · Bleeding into the abdomen  (retroperitoneal bleeding).  · Stroke (rare).  · Heart attack (rare).  What happens before the procedure?  Staying hydrated  Follow instructions from your health care provider about hydration, which may include:  · Up to 2 hours before the procedure - you may continue to drink clear liquids, such as water, clear fruit juice, black coffee, and plain tea.    Eating and drinking restrictions  Follow instructions from your health care provider about eating and drinking, which may include:  · 8 hours before the procedure - stop eating heavy meals or foods, such as meat, fried foods, or fatty foods.  · 6 hours before the procedure - stop eating light meals or foods, such as toast or cereal.  · 2 hours before the procedure - stop drinking clear liquids.  Medicines  Ask your health care provider about:  · Changing or stopping your regular medicines. This is especially important if you are taking diabetes medicines or blood thinners.  · Taking medicines such as aspirin and ibuprofen. These medicines can thin your blood. Do not take these medicines unless your health care provider tells you to take them.  ? Generally, aspirin is recommended before a thin tube, called a catheter, is passed through a blood vessel and inserted into the heart (cardiac catheterization).  · Taking over-the-counter medicines, vitamins, herbs, and supplements.  General instructions  · Do not use any products that contain nicotine or tobacco for at least 4 weeks before the procedure. These products include cigarettes, e-cigarettes, and chewing tobacco. If you need help quitting, ask your health care provider.  · Plan to have someone take you home from the hospital or clinic.  · If you will be going home right after the procedure, plan to have someone with you for 24 hours.  · You may have tests and imaging procedures.  · Ask your health care provider:  ? How your insertion site will be marked. Ask which artery will be used for the procedure.  ? What  steps will be taken to help prevent infection. These may include:  § Removing hair at the insertion site.  § Washing skin with a germ-killing soap.  § Taking antibiotic medicine.  What happens during the procedure?    · An IV will be inserted into one of your veins.  · Electrodes may be placed on your chest to monitor your heart rate during the procedure.  · You will be given one or more of the following:  ? A medicine to help you relax (sedative).  ? A medicine to numb the area (local anesthetic) for catheter insertion.  · A small incision will be made for catheter insertion.  · The catheter will be inserted into an artery using a guide wire. The location may be in your groin, your wrist, or the fold of your arm (near your elbow).  · An X-ray procedure (fluoroscopy) will be used to help guide the catheter to the opening of the heart arteries.  · A dye will be injected into the catheter. X-rays will be taken. The dye helps to show where any narrowing or blockages are located in the arteries.  · Tell your health care provider if you have chest pain or trouble breathing.  · A tiny wire will be guided to the blocked spot, and a balloon will be inflated to make the artery wider.  · The stent will be expanded to crush the plaques into the wall of the vessel. The stent will hold the area open and improve the blood flow. Most stents have a drug coating to reduce the risk of the stent narrowing over time.  · The artery may be made wider using a drill, laser, or other tools that remove plaques.  · The catheter will be removed when the blood flow improves. The stent will stay where it was placed, and the lining of the artery will grow over it.  · A bandage (dressing) will be placed on the insertion site. Pressure will be applied to stop bleeding.  · The IV will be removed.  This procedure may vary among health care providers and hospitals.  What happens after the procedure?  · Your blood pressure, heart rate, breathing rate,  and blood oxygen level will be monitored until you leave the hospital or clinic.  · If the procedure is done through the leg, you will lie flat in bed for a few hours or for as long as told by your health care provider. You will be instructed not to bend or cross your legs.  · The insertion site and the pulse in your foot or wrist will be checked often.  · You may have more blood tests, X-rays, and a test that records the electrical activity of your heart (electrocardiogram, or ECG).  · Do not drive for 24 hours if you were given a sedative during your procedure.  Summary  · Coronary angiogram with stent placement is a procedure to widen or open a narrowed coronary artery. This is done to treat heart problems.  · Before the procedure, let your health care provider know about all the medical conditions and surgeries you have or have had.  · This is a safe procedure. However, some problems may occur, including damage to nearby structures or organs, bleeding, blood clots, or allergies.  · Follow your health care provider's instructions about eating, drinking, medicines, and other lifestyle changes, such as quitting tobacco use before the procedure.  This information is not intended to replace advice given to you by your health care provider. Make sure you discuss any questions you have with your health care provider.  Document Revised: 07/08/2020 Document Reviewed: 07/08/2020  Elsevier Patient Education © 2020 [employer] Inc[employer]

## 2021-03-08 NOTE — PROGRESS NOTES
Subjective   Renetta Horner is a 60 y.o. female     Chief Complaint   Patient presents with   • Follow-up   • precordial pain       HPI    Problem List:    1. Atrial Fibrillation   1.1 CHADS score 2; patient Pradaxa and Tikosyn  1.2 Event Monitor -5/3/16 - Afib and Afib with RVR  1.3 Ablation with Dr. Rey 17  1.4 Cardioversion with Dr. Rey 17  2. Hypertension  2.1 Echo 4/8/15 - EF > 65%; DD II; trace MR  3. Chest Pain   3.1 Stress Test 4/8/15 - no ischemia   3.2 stress test 3/3/2021-degraded images, however compatible with moderate size lateral wall infarct, EF 62%, markedly elevated transischemic dilation ratio probably fictitious  4. Diabetes Mellitus II  5. CKD I Dr. Kelly   6.  Covid vaccine, Maderna, first dose 3/1/2021 and second dose will be 3/29/2021    Patient is a 60-year-old female who presents today for follow-up on stress test with her  at her side.  She continues to have what she describes as midsternum burning.  She says she had it after eating a tangerine today but then she also has it when she is stressed out.  She says it is very random though and she has no other symptoms when it occurs.  It does not radiate.  She denies any palpitations, fluttering, presyncope, syncope, orthopnea or PND.  She does get dizzy when she lays down and she says she is always done this since she was diagnosed with A. fib.  She says she gets some swelling in her ankles, legs and feet.  She denies shortness of breath with activity however she stays very fatigued.  She denies any signs of bleeding or cerebral ischemic events.    We went over her stress test.  We are the fact that it can be falsely abnormal.  We also discussed the fact that her mother had her major CABG in her late 40s early 50s and  at 63 from a massive heart attack.  Patient's mother was also a non-smoker but was diabetic just like the patient.  Patient's most recent cholesterol readings showed LDL of 117.  Due to all  this as well as patient's obesity she prefers to proceed with left heart cath to confirm rule out any actual blockage.    Current Outpatient Medications on File Prior to Visit   Medication Sig Dispense Refill   • docusate sodium (COLACE) 100 MG capsule Take 200 mg by mouth Daily.     • dofetilide (TIKOSYN) 500 MCG capsule Take 1 capsule by mouth Every 12 (Twelve) Hours. 180 capsule 1   • furosemide (LASIX) 20 MG tablet Take 1 tablet by mouth Daily As Needed (edema). 20 tablet 3   • irbesartan (AVAPRO) 300 MG tablet Take 1 tablet by mouth every night at bedtime. 90 tablet 3   • metFORMIN (GLUCOPHAGE) 500 MG tablet Take 500 mg by mouth Daily With Breakfast.     • nitroglycerin (NITROSTAT) 0.4 MG SL tablet 1 under the tongue as needed for angina, may repeat q5mins for up three doses 30 tablet 5   • pantoprazole (PROTONIX) 40 MG EC tablet Take 40 mg by mouth Daily.     • Pradaxa 150 MG capsu Take 1 capsule by mouth twice daily 180 capsule 3   • vitamin D (ERGOCALCIFEROL) 34747 units capsule capsule Take 50,000 Units by mouth 1 (One) Time Per Week.     • [DISCONTINUED] dilTIAZem CD (CARTIA XT) 240 MG 24 hr capsule Take 1 capsule by mouth Daily. 30 capsule 11     No current facility-administered medications on file prior to visit.       ALLERGIES    Atenolol, Eliquis [apixaban], Indomethacin, Lisinopril, Penicillins, Aspirin, Codeine, Other, Sulfa antibiotics, Ultram [tramadol], and Xarelto [rivaroxaban]    Past Medical History:   Diagnosis Date   • Arthritis    • Atrial fibrillation (CMS/HCC)     CHADS-VASC = 3   • Atrial fibrillation (CMS/HCC) 5/9/2016    1. Persistent Atrial Fibrillation s/p ECV X2 with IRAF and failed Flecainide     A. Diagnosed in 2015. CHADS-VASc = 3 (DM, HTN, female)      B. Xarelto & ASA stopped due to hemarthrosis of knee.        C. Restarted on Pradaxa and no major issues 150 mg BID     D. Echo 4/8/15 showed EF 65%, moderate, Grade II diastolic dysfunction, mild SERA (LA 3.9cm), normal valves      E. Myocardial Perfusion study 4/8/15: negative for ischemia, EF 68%     • Chest pain    • Diabetes mellitus (CMS/[facility])     on oral agents, Type 2 , Patient states last a1c was 5.9 --DX'D 5 YEARS AGO, CHECKS BLOOD SUGAR DAILY AVERAGE 115-122   • Dizziness    • Fatigue    • GERD (gastroesophageal reflux disease)    • Headache    • Heart murmur    • Hypertension     CONTROLLED WITH MEDS PER PT    • Kidney stone    • Palpitations    • PONV (postoperative nausea and vomiting)    • Wears dentures     UPPER PLATE    • Wears dentures     upper   • Wears eyeglasses     reading       Social History     Socioeconomic History   • Marital status:      Spouse name: Not on file   • Number of children: Not on file   • Years of education: Not on file   • Highest education level: Not on file   Tobacco Use   • Smoking status: Former Smoker     Types: Cigarettes   • Smokeless tobacco: Never Used   • Tobacco comment: QUIT 1997   Substance and Sexual Activity   • Alcohol use: No   • Drug use: No   • Sexual activity: Defer       Family History   Problem Relation Age of Onset   • Coronary artery disease Mother    • Breast cancer Mother    • Diabetes Mother    • Other Mother         Benign prostatic hypertrophy       Review of Systems   Constitutional: Positive for fatigue (stays fatigued ). Negative for appetite change, chills, diaphoresis and fever.   HENT: Negative for congestion, rhinorrhea and sore throat.    Eyes: Positive for visual disturbance (reading glasses).   Respiratory: Negative for cough, chest tightness, shortness of breath and wheezing.    Cardiovascular: Positive for chest pain (burning midsternum, after eating tangerine today and when she gets stressed; it is very random; no other symptoms; does not radiate) and leg swelling (ankles legs feet sometimes ). Negative for palpitations.   Gastrointestinal: Negative for abdominal pain, blood in stool, constipation, diarrhea, nausea and vomiting.   Endocrine: Positive for  "heat intolerance (hot flashes ). Negative for cold intolerance.   Genitourinary: Negative for difficulty urinating, dysuria, frequency, hematuria and urgency.   Musculoskeletal: Negative for back pain, joint swelling, neck pain and neck stiffness.   Skin: Negative for rash and wound.   Allergic/Immunologic: Positive for food allergies (turkey ). Negative for environmental allergies.   Neurological: Positive for dizziness (when she lays down in the bed and its only for a few mins; had since having Afib ) and headaches. Negative for weakness, light-headedness and numbness.   Hematological: Does not bruise/bleed easily.   Psychiatric/Behavioral: Negative for sleep disturbance.       Objective   BP (!) 184/72   Pulse 68   Temp 97.6 °F (36.4 °C)   Ht 167.6 cm (66\")   Wt 128 kg (283 lb)   LMP  (LMP Unknown)   SpO2 97%   BMI 45.68 kg/m²   Vitals:    03/08/21 1432   BP: (!) 184/72   Pulse: 68   Temp: 97.6 °F (36.4 °C)   SpO2: 97%   Weight: 128 kg (283 lb)   Height: 167.6 cm (66\")      Lab Results (most recent)     None        Physical Exam  Vitals reviewed.   Constitutional:       General: She is awake.      Appearance: Normal appearance. She is well-developed and well-groomed. She is morbidly obese.   HENT:      Head: Normocephalic.   Eyes:      General: Lids are normal.   Neck:      Vascular: No carotid bruit, hepatojugular reflux or JVD.   Cardiovascular:      Rate and Rhythm: Normal rate and regular rhythm.      Pulses:           Radial pulses are 2+ on the right side and 2+ on the left side.        Dorsalis pedis pulses are 2+ on the right side and 2+ on the left side.        Posterior tibial pulses are 2+ on the right side and 2+ on the left side.      Heart sounds: Normal heart sounds.   Pulmonary:      Effort: Pulmonary effort is normal.      Breath sounds: Normal breath sounds and air entry.   Abdominal:      General: Bowel sounds are normal.      Palpations: Abdomen is soft.   Musculoskeletal:      Right " lower leg: Edema (trace ) present.      Left lower leg: Edema (trace) present.   Skin:     General: Skin is warm and dry.   Neurological:      Mental Status: She is alert and oriented to person, place, and time.   Psychiatric:         Attention and Perception: Attention and perception normal.         Mood and Affect: Mood and affect normal.         Speech: Speech normal.         Behavior: Behavior normal. Behavior is cooperative.         Thought Content: Thought content normal.         Cognition and Memory: Cognition and memory normal.         Judgment: Judgment normal.         Procedure   Procedures         Assessment/Plan      Diagnosis Plan   1. Abnormal stress test  [facility]    clopidogrel (PLAVIX) 75 MG tablet   2. Persistent atrial fibrillation (CMS/HCC)  [facility]   3. Grade II diastolic dysfunction  potassium chloride 10 MEQ CR tablet   4. Essential hypertension  [facility]    CBC (No Diff)   5. Obesity, Class III, BMI 40-49.9 (morbid obesity) (CMS/HCC)     6. Peripheral edema  dilTIAZem CD (Cartia XT) 240 MG 24 hr capsule    potassium chloride 10 MEQ CR tablet   7. Mixed hyperlipidemia  [facility]   8. Other chest pain  [facility]    clopidogrel (PLAVIX) 75 MG tablet   9. Paroxysmal atrial fibrillation (CMS/HCC)  dilTIAZem CD (Cartia XT) 240 MG 24 hr capsule    [facility]   10. Pre-op testing  COVID-19,[facility] ROUTINE, NP/OP SWAB IN TRANSPORT MEDIA OR ESWAB 72 HR TAT - Swab, Nasopharynx   11. Family history of early CAD         Return 2-4 weeks after C.    Abnormal stress test/A. fib/hypertension/obesity/A. fib/hyperlipidemia/chest pain/family history of early CAD/diabetes-patient will proceed with left heart cath.  She will start Plavix she will take it up to and including the day of the cath.  She will hold her Pradaxa 3 days prior to heart cath.  She will hold her metformin for 24 hours prior to heart cath.  She was encouraged to use nitro  as needed for chest pain no resolution she will go to the ER.  She already has a CMP ordered for Monday therefore I will add a CBC.  She will get Covid tested.  She will most likely do this down in [facility] she knows to do it 5 days prior to heart cath.  She will take potassium with her Lasix when she uses it.  She was out of her cardia and this is part of the reason why her blood pressure is elevated and the other part is due to the fact she was very stressed out about her appointment today.  She will continue her medication regimen with above-noted change.  She will follow-up 2 to 4 weeks after heart cath or sooner if any changes.  I also asked that a note be made that she will need something for back pain for the heart cath.    We did discuss the need for statin especially if she has any plaque in her arteries at all.  We will discuss further after heart cath.  Which she should really be on some anyways due to the fact she is diabetic.    Renetta PRATT Evens  reports that she has quit smoking. Her smoking use included cigarettes. She has never used smokeless tobacco..        Patient's Body mass index is 45.68 kg/m². BMI is above normal parameters. Recommendations include: educational material.      Electronically signed by:

## 2021-03-15 ENCOUNTER — LAB (OUTPATIENT)
Dept: LAB | Facility: HOSPITAL | Age: 61
End: 2021-03-15

## 2021-03-15 ENCOUNTER — TELEPHONE (OUTPATIENT)
Dept: CARDIOLOGY | Facility: CLINIC | Age: 61
End: 2021-03-15

## 2021-03-15 DIAGNOSIS — E78.2 MIXED HYPERLIPIDEMIA: ICD-10-CM

## 2021-03-15 DIAGNOSIS — R07.89 OTHER CHEST PAIN: ICD-10-CM

## 2021-03-15 DIAGNOSIS — I10 ESSENTIAL HYPERTENSION: ICD-10-CM

## 2021-03-15 DIAGNOSIS — I48.19 PERSISTENT ATRIAL FIBRILLATION (HCC): ICD-10-CM

## 2021-03-15 DIAGNOSIS — I48.0 PAROXYSMAL ATRIAL FIBRILLATION (HCC): ICD-10-CM

## 2021-03-15 DIAGNOSIS — R94.39 ABNORMAL STRESS TEST: ICD-10-CM

## 2021-03-15 LAB
ALBUMIN SERPL-MCNC: 4.05 G/DL (ref 3.5–5.2)
ALBUMIN/GLOB SERPL: 1.2 G/DL
ALP SERPL-CCNC: 81 U/L (ref 39–117)
ALT SERPL W P-5'-P-CCNC: 52 U/L (ref 1–33)
ANION GAP SERPL CALCULATED.3IONS-SCNC: 10.4 MMOL/L (ref 5–15)
AST SERPL-CCNC: 44 U/L (ref 1–32)
BILIRUB SERPL-MCNC: 0.5 MG/DL (ref 0–1.2)
BUN SERPL-MCNC: 16 MG/DL (ref 8–23)
BUN/CREAT SERPL: 24.2 (ref 7–25)
CALCIUM SPEC-SCNC: 9.5 MG/DL (ref 8.6–10.5)
CHLORIDE SERPL-SCNC: 99 MMOL/L (ref 98–107)
CO2 SERPL-SCNC: 27.6 MMOL/L (ref 22–29)
CREAT SERPL-MCNC: 0.66 MG/DL (ref 0.57–1)
DEPRECATED RDW RBC AUTO: 41.8 FL (ref 37–54)
ERYTHROCYTE [DISTWIDTH] IN BLOOD BY AUTOMATED COUNT: 12.7 % (ref 12.3–15.4)
GFR SERPL CREATININE-BSD FRML MDRD: 91 ML/MIN/1.73
GLOBULIN UR ELPH-MCNC: 3.5 GM/DL
GLUCOSE SERPL-MCNC: 125 MG/DL (ref 65–99)
HCT VFR BLD AUTO: 40.4 % (ref 34–46.6)
HGB BLD-MCNC: 12.8 G/DL (ref 12–15.9)
MCH RBC QN AUTO: 28.5 PG (ref 26.6–33)
MCHC RBC AUTO-ENTMCNC: 31.7 G/DL (ref 31.5–35.7)
MCV RBC AUTO: 90 FL (ref 79–97)
PLATELET # BLD AUTO: 291 10*3/MM3 (ref 140–450)
PMV BLD AUTO: 10.8 FL (ref 6–12)
POTASSIUM SERPL-SCNC: 4.9 MMOL/L (ref 3.5–5.2)
PROT SERPL-MCNC: 7.5 G/DL (ref 6–8.5)
RBC # BLD AUTO: 4.49 10*6/MM3 (ref 3.77–5.28)
SODIUM SERPL-SCNC: 137 MMOL/L (ref 136–145)
WBC # BLD AUTO: 8.86 10*3/MM3 (ref 3.4–10.8)

## 2021-03-15 PROCEDURE — 80053 COMPREHEN METABOLIC PANEL: CPT | Performed by: NURSE PRACTITIONER

## 2021-03-15 PROCEDURE — 36415 COLL VENOUS BLD VENIPUNCTURE: CPT

## 2021-03-15 PROCEDURE — 85027 COMPLETE CBC AUTOMATED: CPT | Performed by: NURSE PRACTITIONER

## 2021-03-15 NOTE — TELEPHONE ENCOUNTER
----- Message from SUSANA Kumar sent at 3/15/2021  4:23 PM EDT -----  Please advise patient. ALT is up and AST is down;  forwarded to PCP.  For [facility]   Pt was advised of lab results :    ALT (SGPT)   1 - 33 U/L 52High      AST (SGOT)   1 - 32 U/L 44High      Hemoglobin   12.0 - 15.9 g/dL 12.8      Hematocrit   34.0 - 46.6 % 40.4

## 2021-03-21 LAB
BH CV ECHO MEAS - ACS: 2.4 CM
BH CV ECHO MEAS - AO MAX PG: 10 MMHG
BH CV ECHO MEAS - AO MEAN PG: 5 MMHG
BH CV ECHO MEAS - AO ROOT AREA (BSA CORRECTED): 1.3
BH CV ECHO MEAS - AO ROOT AREA: 6.8 CM^2
BH CV ECHO MEAS - AO ROOT DIAM: 3 CM
BH CV ECHO MEAS - AO V2 MAX: 158 CM/SEC
BH CV ECHO MEAS - AO V2 MEAN: 109 CM/SEC
BH CV ECHO MEAS - AO V2 VTI: 32.1 CM
BH CV ECHO MEAS - BSA(HAYCOCK): 2.5 M^2
BH CV ECHO MEAS - BSA: 2.3 M^2
BH CV ECHO MEAS - BZI_BMI: 45.2 KILOGRAMS/M^2
BH CV ECHO MEAS - BZI_METRIC_HEIGHT: 167.6 CM
BH CV ECHO MEAS - BZI_METRIC_WEIGHT: 127 KG
BH CV ECHO MEAS - EDV(CUBED): 75.2 ML
BH CV ECHO MEAS - EDV(MOD-SP4): 87.8 ML
BH CV ECHO MEAS - EDV(TEICH): 79.5 ML
BH CV ECHO MEAS - EF(CUBED): 82.5 %
BH CV ECHO MEAS - EF(MOD-SP2): 75.7 %
BH CV ECHO MEAS - EF(MOD-SP4): 42.7 %
BH CV ECHO MEAS - EF(TEICH): 75.7 %
BH CV ECHO MEAS - ESV(CUBED): 13.1 ML
BH CV ECHO MEAS - ESV(MOD-SP4): 50.3 ML
BH CV ECHO MEAS - ESV(TEICH): 19.3 ML
BH CV ECHO MEAS - FS: 44.1 %
BH CV ECHO MEAS - IVS/LVPW: 1
BH CV ECHO MEAS - IVSD: 1.3 CM
BH CV ECHO MEAS - LA DIMENSION: 3.8 CM
BH CV ECHO MEAS - LA/AO: 1.3
BH CV ECHO MEAS - LV DIASTOLIC VOL/BSA (35-75): 38 ML/M^2
BH CV ECHO MEAS - LV IVRT: 0.12 SEC
BH CV ECHO MEAS - LV MASS(C)D: 196.2 GRAMS
BH CV ECHO MEAS - LV MASS(C)DI: 85 GRAMS/M^2
BH CV ECHO MEAS - LV SYSTOLIC VOL/BSA (12-30): 21.8 ML/M^2
BH CV ECHO MEAS - LVIDD: 4.2 CM
BH CV ECHO MEAS - LVIDS: 2.4 CM
BH CV ECHO MEAS - LVLD AP4: 7.2 CM
BH CV ECHO MEAS - LVLS AP4: 6.8 CM
BH CV ECHO MEAS - LVOT AREA (M): 3.1 CM^2
BH CV ECHO MEAS - LVOT AREA: 3.1 CM^2
BH CV ECHO MEAS - LVOT DIAM: 2 CM
BH CV ECHO MEAS - LVPWD: 1.3 CM
BH CV ECHO MEAS - MV A MAX VEL: 114 CM/SEC
BH CV ECHO MEAS - MV DEC SLOPE: 293 CM/SEC^2
BH CV ECHO MEAS - MV E MAX VEL: 99 CM/SEC
BH CV ECHO MEAS - MV E/A: 0.87
BH CV ECHO MEAS - RVDD: 3.4 CM
BH CV ECHO MEAS - SI(AO): 95.1 ML/M^2
BH CV ECHO MEAS - SI(CUBED): 26.9 ML/M^2
BH CV ECHO MEAS - SI(MOD-SP4): 16.3 ML/M^2
BH CV ECHO MEAS - SI(TEICH): 26.1 ML/M^2
BH CV ECHO MEAS - SV(AO): 219.4 ML
BH CV ECHO MEAS - SV(CUBED): 62 ML
BH CV ECHO MEAS - SV(MOD-SP4): 37.5 ML
BH CV ECHO MEAS - SV(TEICH): 60.1 ML

## 2021-03-22 ENCOUNTER — TELEPHONE (OUTPATIENT)
Dept: CARDIOLOGY | Facility: CLINIC | Age: 61
End: 2021-03-22

## 2021-03-22 NOTE — TELEPHONE ENCOUNTER
Echo:  Left ventricular ejection fraction appears to be 56 - 60%.     Keep cath appt on 4/5.         First attempt to reach pt. Left a voicemail for pt to return my call at [phone].

## 2021-03-22 NOTE — TELEPHONE ENCOUNTER
----- Message from Socorro Mosher MA sent at 3/22/2021  8:43 AM EDT -----  Regarding: FW:    ----- Message -----  From: Katelynn Smith APRN  Sent: 3/21/2021   5:04 PM EDT  To: Socorro Mosher MA  Subject: FW:                                              Please advise patient  ----- Message -----  From: [place] Seals MD  Sent: 3/21/2021  12:19 PM EDT  To: SUSANA Kumar

## 2021-03-23 NOTE — TELEPHONE ENCOUNTER
Pt called and was transfered to me. Went over results w/ pt and advised her to keep [facility] appt, she verbalized understanding.

## 2021-04-05 ENCOUNTER — OUTSIDE FACILITY SERVICE (OUTPATIENT)
Dept: CARDIOLOGY | Facility: CLINIC | Age: 61
End: 2021-04-05

## 2021-04-05 PROCEDURE — 93458 L HRT ARTERY/VENTRICLE ANGIO: CPT | Performed by: INTERNAL MEDICINE

## 2021-04-26 ENCOUNTER — OFFICE VISIT (OUTPATIENT)
Dept: CARDIOLOGY | Facility: CLINIC | Age: 61
End: 2021-04-26

## 2021-04-26 VITALS
BODY MASS INDEX: 45.32 KG/M2 | WEIGHT: 282 LBS | HEART RATE: 63 BPM | SYSTOLIC BLOOD PRESSURE: 164 MMHG | DIASTOLIC BLOOD PRESSURE: 74 MMHG | TEMPERATURE: 97.6 F | HEIGHT: 66 IN | OXYGEN SATURATION: 96 %

## 2021-04-26 DIAGNOSIS — R60.9 PERIPHERAL EDEMA: ICD-10-CM

## 2021-04-26 DIAGNOSIS — Z82.49 FAMILY HISTORY OF EARLY CAD: ICD-10-CM

## 2021-04-26 DIAGNOSIS — I51.89 GRADE II DIASTOLIC DYSFUNCTION: ICD-10-CM

## 2021-04-26 DIAGNOSIS — I10 ESSENTIAL HYPERTENSION: ICD-10-CM

## 2021-04-26 DIAGNOSIS — I48.19 PERSISTENT ATRIAL FIBRILLATION (HCC): Primary | ICD-10-CM

## 2021-04-26 DIAGNOSIS — E78.2 MIXED HYPERLIPIDEMIA: ICD-10-CM

## 2021-04-26 PROCEDURE — 99214 OFFICE O/P EST MOD 30 MIN: CPT | Performed by: NURSE PRACTITIONER

## 2021-04-26 NOTE — PATIENT INSTRUCTIONS
"BMI for Adults  What is BMI?  Body mass index (BMI) is a number that is calculated from a person's weight and height. BMI can help estimate how much of a person's weight is composed of fat. BMI does not measure body fat directly. Rather, it is an alternative to procedures that directly measure body fat, which can be difficult and expensive.  BMI can help identify people who may be at higher risk for certain medical problems.  What are BMI measurements used for?  BMI is used as a screening tool to identify possible weight problems. It helps determine whether a person is obese, overweight, a healthy weight, or underweight.  BMI is useful for:  · Identifying a weight problem that may be related to a medical condition or may increase the risk for medical problems.  · Promoting changes, such as changes in diet and exercise, to help reach a healthy weight. BMI screening can be repeated to see if these changes are working.  How is BMI calculated?  BMI involves measuring your weight in relation to your height. Both height and weight are measured, and the BMI is calculated from those numbers. This can be done either in English (U[place] or metric measurements. Note that charts and online BMI calculators are available to help you find your BMI quickly and easily without having to do these calculations yourself.  To calculate your BMI in English ([place].S[place] measurements:    1. Measure your weight in pounds (lb).  2. Multiply the number of pounds by 703.  ? For example, for a person who weighs 180 lb, multiply that number by 703, which equals 126,540.  3. Measure your height in inches. Then multiply that number by itself to get a measurement called \"inches squared.\"  ? For example, for a person who is 70 inches tall, the \"inches squared\" measurement is 70 inches x 70 inches, which equals 4,900 inches squared.  4. Divide the total from step 2 (number of lb x 703) by the total from step 3 (inches squared): 126,540 ÷ 4,900 = 25.8. This is " "your BMI.  To calculate your BMI in metric measurements:  1. Measure your weight in kilograms (kg).  2. Measure your height in meters (m). Then multiply that number by itself to get a measurement called \"meters squared.\"  ? For example, for a person who is 1.75 m tall, the \"meters squared\" measurement is 1.75 m x 1.75 m, which is equal to 3.1 meters squared.  3. Divide the number of kilograms (your weight) by the meters squared number. In this example: 70 ÷ 3.1 = 22.6. This is your BMI.  What do the results mean?  BMI charts are used to identify whether you are underweight, normal weight, overweight, or obese. The following guidelines will be used:  · Underweight: BMI less than 18.5.  · Normal weight: BMI between 18.5 and 24.9.  · Overweight: BMI between 25 and 29.9.  · Obese: BMI of 30 or above.  Keep these notes in mind:  · Weight includes both fat and muscle, so someone with a muscular build, such as an athlete, may have a BMI that is higher than 24.9. In cases like these, BMI is not an accurate measure of body fat.  · To determine if excess body fat is the cause of a BMI of 25 or higher, further assessments may need to be done by a health care provider.  · BMI is usually interpreted in the same way for men and women.  Where to find more information  For more information about BMI, including tools to quickly calculate your BMI, go to these websites:  · Centers for Disease Control and Prevention: www.cdc.gov  · American Heart Association: www.heart.org  · National Heart, Lung, and [employer] [place]: www.nhlbi.nih.gov  Summary  · Body mass index (BMI) is a number that is calculated from a person's weight and height.  · BMI may help estimate how much of a person's weight is composed of fat. BMI can help identify those who may be at higher risk for certain medical problems.  · BMI can be measured using English measurements or metric measurements.  · BMI charts are used to identify whether you are underweight, normal " weight, overweight, or obese.  This information is not intended to replace advice given to you by your health care provider. Make sure you discuss any questions you have with your health care provider.  Document Revised: 09/09/2020 Document Reviewed: 07/17/2020  Elsevier Patient Education © 2021 [email or web address]    Edema    Edema is an abnormal buildup of fluids in the body tissues and under the skin. Swelling of the legs, feet, and ankles is a common symptom that becomes more likely as you get older. Swelling is also common in looser tissues, like around the eyes. When the affected area is squeezed, the fluid may move out of that spot and leave a dent for a few moments. This dent is called pitting edema.  There are many possible causes of edema. Eating too much salt (sodium) and being on your feet or sitting for a long time can cause edema in your legs, feet, and ankles. Hot weather may make edema worse. Common causes of edema include:  · Heart failure.  · Liver or kidney disease.  · Weak leg blood vessels.  · Cancer.  · An injury.  · Pregnancy.  · Medicines.  · Being obese.  · Low protein levels in the blood.  Edema is usually painless. Your skin may look swollen or shiny.  Follow these instructions at home:  · Keep the affected body part raised (elevated) above the level of your heart when you are sitting or lying down.  · Do not sit still or stand for long periods of time.  · Do not wear tight clothing. Do not wear garters on your upper legs.  · Exercise your legs to get your circulation going. This helps to move the fluid back into your blood vessels, and it may help the swelling go down.  · Wear elastic bandages or support stockings to reduce swelling as told by your health care provider.  · Eat a low-salt (low-sodium) diet to reduce fluid as told by your health care provider.  · Depending on the cause of your swelling, you may need to limit how much fluid you drink (fluid restriction).  · Take over-the-counter  and prescription medicines only as told by your health care provider.  Contact a health care provider if:  · Your edema does not get better with treatment.  · You have heart, liver, or kidney disease and have symptoms of edema.  · You have sudden and unexplained weight gain.  Get help right away if:  · You develop shortness of breath or chest pain.  · You cannot breathe when you lie down.  · You develop pain, redness, or warmth in the swollen areas.  · You have heart, liver, or kidney disease and suddenly get edema.  · You have a fever and your symptoms suddenly get worse.  Summary  · Edema is an abnormal buildup of fluids in the body tissues and under the skin.  · Eating too much salt (sodium) and being on your feet or sitting for a long time can cause edema in your legs, feet, and ankles.  · Keep the affected body part raised (elevated) above the level of your heart when you are sitting or lying down.  This information is not intended to replace advice given to you by your health care provider. Make sure you discuss any questions you have with your health care provider.  Document Revised: 05/06/2020 Document Reviewed: 01/20/2018  [employer] Patient Education © 2021 [employer] Inc.

## 2021-04-26 NOTE — PROGRESS NOTES
Subjective   Renetta Horner is a 60 y.o. female     Chief Complaint   Patient presents with   • Follow-up   • abnormal stress test       HPI    Problem List:    1. Atrial Fibrillation   1.1 CHADS score 2; patient Pradaxa and Tikosyn  1.2 Event Monitor 4/23-5/3/16 - Afib and Afib with RVR  1.3 Ablation with Dr. Rey 1/18/17  1.4 Cardioversion with Dr. Rey 4/28/17  2. Hypertension  2.1 Echo 4/8/15 - EF > 65%; DD II; trace MR  2.2 Echo 3/3/2021-Global LV systolic function is preserved, some degree of LVH, diastolic dysfunction 1  3. Chest Pain   3.1 stress test 3/3/2021-degraded images, however compatible with moderate size lateral wall infarct, EF 62%, markedly elevated transischemic dilation ratio probably fictitious  3.2 left heart cath 4/5/2021-normal coronary arteries, EF 65%, LVEDP 18-20  4. Diabetes Mellitus II  5. CKD I Dr. Kelly   6.  Covid vaccine, Maderna, first dose 3/1/2021 and second dose will be 3/29/2021    Patient is a 60-year-old female who presents today for follow-up status post left heart cath.  She denies any chest pain, pressure, palpitations, fluttering, presyncope, syncope, orthopnea or PND.  She does get some dizziness when she lays down.  She says she has some swelling in her ankles and uses her water pill about twice a week.  She says outside of your blood pressure does very good.  She denies any shortness of breath with activity.  Right radial artery is unremarkable with the exception of like a nerve discomfort when it touches a specific side of her wrist but not over the actual cath site.  Patient denies any signs of bleeding or cerebral ischemic events.    We went over left heart cath.    Current Outpatient Medications on File Prior to Visit   Medication Sig Dispense Refill   • dilTIAZem CD (Cartia XT) 240 MG 24 hr capsule Take 1 capsule by mouth Daily. 90 capsule 3   • docusate sodium (COLACE) 100 MG capsule Take 200 mg by mouth Daily.     • dofetilide (TIKOSYN) 500 MCG capsule  Take 1 capsule by mouth Every 12 (Twelve) Hours. 180 capsule 1   • furosemide (LASIX) 20 MG tablet Take 1 tablet by mouth Daily As Needed (edema). 20 tablet 3   • irbesartan (AVAPRO) 300 MG tablet Take 1 tablet by mouth every night at bedtime. 90 tablet 3   • metFORMIN (GLUCOPHAGE) 500 MG tablet Take 500 mg by mouth Daily With Breakfast.     • nitroglycerin (NITROSTAT) 0.4 MG SL tablet 1 under the tongue as needed for angina, may repeat q5mins for up three doses 30 tablet 5   • pantoprazole (PROTONIX) 40 MG EC tablet Take 40 mg by mouth Daily.     • potassium chloride 10 MEQ CR tablet Take 1 tablet by mouth Daily As Needed (with lasix). 90 tablet 3   • Pradaxa 150 MG capsu Take 1 capsule by mouth twice daily 180 capsule 3   • vitamin D (ERGOCALCIFEROL) 23468 units capsule capsule Take 50,000 Units by mouth 1 (One) Time Per Week.     • [DISCONTINUED] clopidogrel (PLAVIX) 75 MG tablet Take 1 tablet by mouth Daily. 30 tablet 11     No current facility-administered medications on file prior to visit.       ALLERGIES    Atenolol, Eliquis [apixaban], Indomethacin, Lisinopril, Penicillins, Aspirin, Codeine, Other, Sulfa antibiotics, Ultram [tramadol], and Xarelto [rivaroxaban]    Past Medical History:   Diagnosis Date   • Arthritis    • Atrial fibrillation (CMS/HCC)     CHADS-VASC = 3   • Atrial fibrillation (CMS/HCC) 5/9/2016    1. Persistent Atrial Fibrillation s/p ECV X2 with IRAF and failed Flecainide     A. Diagnosed in 2015. CHADS-VASc = 3 (DM, HTN, female)      B. Xarelto & ASA stopped due to hemarthrosis of knee.        C. Restarted on Pradaxa and no major issues 150 mg BID     D. Echo 4/8/15 showed EF 65%, moderate, Grade II diastolic dysfunction, mild SERA (LA 3.9cm), normal valves     E. Myocardial Perfusion study 4/8/15: negative for ischemia, EF 68%     • Chest pain    • COVID-19 vaccine administered 03/01/2021    2nd - 03/30/2021 - Moderna    • Diabetes mellitus (CMS/HCC)     on oral agents, Type 2 , Patient  states last a1c was 5.9 --DX'D 5 YEARS AGO, CHECKS BLOOD SUGAR DAILY AVERAGE 115-122   • Dizziness    • Fatigue    • GERD (gastroesophageal reflux disease)    • Headache    • Heart murmur    • Hypertension     CONTROLLED WITH MEDS PER PT    • Kidney stone    • Palpitations    • PONV (postoperative nausea and vomiting)    • Wears dentures     UPPER PLATE    • Wears dentures     upper   • Wears eyeglasses     reading       Social History     Socioeconomic History   • Marital status:      Spouse name: Not on file   • Number of children: Not on file   • Years of education: Not on file   • Highest education level: Not on file   Tobacco Use   • Smoking status: Former Smoker     Types: Cigarettes   • Smokeless tobacco: Never Used   • Tobacco comment: QUIT 1997   Substance and Sexual Activity   • Alcohol use: No   • Drug use: No   • Sexual activity: Defer       Family History   Problem Relation Age of Onset   • Coronary artery disease Mother    • Breast cancer Mother    • Diabetes Mother    • Other Mother         Benign prostatic hypertrophy       Review of Systems   Constitutional: Negative for appetite change, chills, fatigue and fever.   HENT: Negative for congestion, rhinorrhea and sore throat.    Eyes: Positive for visual disturbance (glasses ( readers) ).   Respiratory: Negative for cough, chest tightness, shortness of breath and wheezing.    Cardiovascular: Positive for leg swelling (at times both ankles; uses water pill 2 x a week ). Negative for chest pain and palpitations.   Gastrointestinal: Negative for abdominal pain, blood in stool, constipation, diarrhea, nausea and vomiting.   Endocrine: Positive for heat intolerance (hot flashes ). Negative for cold intolerance.   Genitourinary: Negative for difficulty urinating, dysuria, frequency, hematuria and urgency.   Musculoskeletal: Positive for arthralgias (all over her body more in her legs ). Negative for back pain, joint swelling, neck pain and neck  "stiffness.   Skin: Negative for color change, pallor, rash and wound.   Allergic/Immunologic: Positive for food allergies (turkey ). Negative for environmental allergies.   Neurological: Positive for dizziness (when she lays down at times ) and headaches (she states it might be a month then it will happen again ). Negative for weakness, light-headedness and numbness.   Hematological: Does not bruise/bleed easily.   Psychiatric/Behavioral: Negative for sleep disturbance.       Objective   /74 (BP Location: Left arm)   Pulse 63   Temp 97.6 °F (36.4 °C)   Ht 167.6 cm (66\")   Wt 128 kg (282 lb)   LMP  (LMP Unknown)   SpO2 96%   BMI 45.52 kg/m²   Vitals:    04/26/21 1405   BP: 164/74   BP Location: Left arm   Pulse: 63   Temp: 97.6 °F (36.4 °C)   SpO2: 96%   Weight: 128 kg (282 lb)   Height: 167.6 cm (66\")      Lab Results (most recent)     None        Physical Exam  Vitals reviewed.   Constitutional:       General: She is awake.      Appearance: Normal appearance. She is well-developed and well-groomed. She is morbidly obese.   HENT:      Head: Normocephalic.   Eyes:      General: Lids are normal.   Neck:      Vascular: No carotid bruit, hepatojugular reflux or JVD.   Cardiovascular:      Rate and Rhythm: Normal rate and regular rhythm.      Pulses:           Radial pulses are 2+ on the right side and 2+ on the left side.        Dorsalis pedis pulses are 2+ on the right side and 2+ on the left side.        Posterior tibial pulses are 2+ on the right side and 2+ on the left side.      Heart sounds: Normal heart sounds.   Pulmonary:      Effort: Pulmonary effort is normal.      Breath sounds: Normal breath sounds and air entry.   Abdominal:      General: Bowel sounds are normal.      Palpations: Abdomen is soft.   Musculoskeletal:      Right lower leg: Edema (trace edeme ) present.      Left lower leg: Edema (trace edema ) present.   Skin:     General: Skin is warm and dry.   Neurological:      Mental Status: " She is alert and oriented to person, place, and time.   Psychiatric:         Attention and Perception: Attention and perception normal.         Mood and Affect: Mood and affect normal.         Speech: Speech normal.         Behavior: Behavior normal. Behavior is cooperative.         Thought Content: Thought content normal.         Cognition and Memory: Cognition and memory normal.         Judgment: Judgment normal.         Procedure   Procedures         Assessment/Plan      Diagnosis Plan   1. Persistent atrial fibrillation (CMS/HCC)     2. Grade II diastolic dysfunction     3. Essential hypertension     4. Mixed hyperlipidemia     5. Family history of early CAD     6. Peripheral edema         Return in about 4 months (around 8/26/2021).    A. fib-patient is on diltiazem, Tikosyn and Pradaxa.  Diastolic dysfunction/peripheral edema-patient is on Lasix.  Hypertension-patient is on diltiazem and irbesartan.  Per her report her blood pressure does excellent.  Hyperlipidemia-patient is diet controlled.  Family history of CAD-left heart cath was normal coronary arteries.  She will continue her medication regimen.  She will follow-up in 4 months or sooner if any changes.         Renetta Horner  reports that she has quit smoking. Her smoking use included cigarettes. She has never used smokeless tobacco..     Patient's Body mass index is 45.52 kg/m². BMI is above normal parameters. Recommendations include: educational material.      Electronically signed by:

## 2021-05-21 ENCOUNTER — OFFICE VISIT (OUTPATIENT)
Dept: CARDIOLOGY | Facility: CLINIC | Age: 61
End: 2021-05-21

## 2021-05-21 VITALS
SYSTOLIC BLOOD PRESSURE: 138 MMHG | HEIGHT: 66 IN | OXYGEN SATURATION: 95 % | WEIGHT: 282 LBS | DIASTOLIC BLOOD PRESSURE: 92 MMHG | HEART RATE: 71 BPM | BODY MASS INDEX: 45.32 KG/M2

## 2021-05-21 DIAGNOSIS — I10 ESSENTIAL HYPERTENSION: ICD-10-CM

## 2021-05-21 DIAGNOSIS — I48.19 PERSISTENT ATRIAL FIBRILLATION (HCC): Primary | ICD-10-CM

## 2021-05-21 DIAGNOSIS — Z79.899 LONG TERM CURRENT USE OF ANTIARRHYTHMIC MEDICAL THERAPY: ICD-10-CM

## 2021-05-21 PROCEDURE — 99214 OFFICE O/P EST MOD 30 MIN: CPT | Performed by: INTERNAL MEDICINE

## 2021-05-21 PROCEDURE — 93000 ELECTROCARDIOGRAM COMPLETE: CPT | Performed by: INTERNAL MEDICINE

## 2021-05-21 RX ORDER — CLOPIDOGREL BISULFATE 75 MG/1
75 TABLET ORAL DAILY
COMMUNITY
End: 2021-07-20

## 2021-05-21 NOTE — PROGRESS NOTES
Renetta Horner  1960  PCP: Gustabo Quach MD    SUBJECTIVE:   Renetta Horner is a 60 y.o. female seen for a follow up visit regarding the following:     Chief Complaint: Follow up for A. Fib    HPI:    Since last visit the patient's status has been stable. Had cardiac cath with no significant findings.     History:       Cardiac PMH: (Old records have been reviewed and summarized below)  1. Persistent Atrial Fibrillation s/p ECV X2 with IRAF and failed Flecainide  A. Diagnosed in 2015. CHADS-VASc = 2   B. No a/c secondary to hemarthrosis on Xarelto.   C. Echo show EF of 65%. Stress test negative for ischemia.   D. Restarted on Pradaxa and no major issues 150 mg BID  E. Pulmonary vein ablation/roof line/box in lesion along the posterior wall on 01/18/2017  F. Recurrent Afib now on Tikosyn 500 mcg BID    Current Outpatient Medications:   •  clopidogrel (PLAVIX) 75 MG tablet, Take 75 mg by mouth Daily., Disp: , Rfl:   •  dilTIAZem CD (Cartia XT) 240 MG 24 hr capsule, Take 1 capsule by mouth Daily., Disp: 90 capsule, Rfl: 3  •  dofetilide (TIKOSYN) 500 MCG capsule, Take 1 capsule by mouth Every 12 (Twelve) Hours., Disp: 180 capsule, Rfl: 1  •  furosemide (LASIX) 20 MG tablet, Take 1 tablet by mouth Daily As Needed (edema)., Disp: 20 tablet, Rfl: 3  •  irbesartan (AVAPRO) 300 MG tablet, Take 1 tablet by mouth every night at bedtime., Disp: 90 tablet, Rfl: 3  •  metFORMIN (GLUCOPHAGE) 500 MG tablet, Take 500 mg by mouth Daily With Breakfast., Disp: , Rfl:   •  pantoprazole (PROTONIX) 40 MG EC tablet, Take 40 mg by mouth Daily., Disp: , Rfl:   •  potassium chloride 10 MEQ CR tablet, Take 1 tablet by mouth Daily As Needed (with lasix)., Disp: 90 tablet, Rfl: 3  •  Pradaxa 150 MG capsu, Take 1 capsule by mouth twice daily, Disp: 180 capsule, Rfl: 3  •  vitamin D (ERGOCALCIFEROL) 82332 units capsule capsule, Take 50,000 Units by mouth 1 (One) Time Per Week., Disp: , Rfl:     Past Medical History, Past  Surgical History, Family history, Social History, and Medications were all reviewed with the patient today and updated as necessary.       Patient Active Problem List   Diagnosis   • Atrial fibrillation (CMS/[facility])   • Chest pain   • Diabetes mellitus (CMS/[facility])   • Gastroesophageal reflux disease   • Heart murmur   • Hypertension   • Palpitations   • Obesity, Class III, BMI 40-49.9 (morbid obesity) (CMS/[facility])   • Persistent atrial fibrillation (CMS/[facility])   • Long term current use of antiarrhythmic medical therapy   • Grade II diastolic dysfunction   • Peripheral edema   • Chest tightness   • Abnormal stress test   • Mixed hyperlipidemia   • Family history of early CAD     Allergies   Allergen Reactions   • Atenolol Shortness Of Breath     Hard to breath   • Eliquis [Apixaban] GI Bleeding   • Indomethacin Hives and Shortness Of Breath     , HIVES    • Lisinopril Shortness Of Breath   • Penicillins Anaphylaxis   • Aspirin GI Intolerance   • Codeine Itching and Nausea Only     ITCHING    • Other Itching     [place]-- hives and vomiting   • Sulfa Antibiotics Nausea And Vomiting   • Ultram [Tramadol] Nausea And Vomiting   • Xarelto [Rivaroxaban] Other (See Comments)     Bleeding knee     Past Medical History:   Diagnosis Date   • Arthritis    • Atrial fibrillation (CMS/[facility])     CHADS-VASC = 3   • Atrial fibrillation (CMS/[facility]) 5/9/2016    1. Persistent Atrial Fibrillation s/p ECV X2 with IRAF and failed Flecainide     A. Diagnosed in 2015. CHADS-VASc = 3 (DM, HTN, female)      B. Xarelto & ASA stopped due to hemarthrosis of knee.        C. Restarted on Pradaxa and no major issues 150 mg BID     D. Echo 4/8/15 showed EF 65%, moderate, Grade II diastolic dysfunction, mild SERA (LA 3.9cm), normal valves     E. Myocardial Perfusion study 4/8/15: negative for ischemia, EF 68%     • Chest pain    • COVID-19 vaccine administered 03/01/2021    2nd - 03/30/2021 - Moderna    • Diabetes mellitus (CMS/[facility])     on oral agents, Type 2 ,  "Patient states last a1c was 5.9 --DX'D 5 YEARS AGO, CHECKS BLOOD SUGAR DAILY AVERAGE 115-122   • Dizziness    • Fatigue    • GERD (gastroesophageal reflux disease)    • Headache    • Heart murmur    • Hypertension     CONTROLLED WITH MEDS PER PT    • Kidney stone    • Palpitations    • PONV (postoperative nausea and vomiting)    • Wears dentures     UPPER PLATE    • Wears dentures     upper   • Wears eyeglasses     reading     Past Surgical History:   Procedure Laterality Date   • ANKLE SURGERY      right    • CARDIAC ELECTROPHYSIOLOGY PROCEDURE N/A 1/18/2017    Procedure: Schedule for a PVA. Cancel the Tikon admission.;  Surgeon: Geronimo Rey DO;  Location: [facility] INVASIVE LOCATION;  Service:    • CHOLECYSTECTOMY     • COLONOSCOPY  2011   • HYSTERECTOMY     • JOINT REPLACEMENT      right knee,    • KNEE SURGERY Right     KNEE REPLACEMENT      Family History   Problem Relation Age of Onset   • Coronary artery disease Mother    • Breast cancer Mother    • Diabetes Mother    • Other Mother         Benign prostatic hypertrophy     Social History     Tobacco Use   • Smoking status: Former Smoker     Types: Cigarettes   • Smokeless tobacco: Never Used   • Tobacco comment: QUIT 1997   Substance Use Topics   • Alcohol use: No         PHYSICAL EXAM:    /92 (BP Location: Left arm, Patient Position: Sitting)   Pulse 71   Ht 167.6 cm (66\")   Wt 128 kg (282 lb)   LMP  (LMP Unknown)   SpO2 95%   BMI 45.52 kg/m²        Wt Readings from Last 5 Encounters:   05/21/21 128 kg (282 lb)   04/26/21 128 kg (282 lb)   03/08/21 128 kg (283 lb)   03/01/21 127 kg (280 lb)   08/19/20 129 kg (285 lb)       BP Readings from Last 5 Encounters:   05/21/21 138/92   04/26/21 164/74   03/08/21 (!) 184/72   03/01/21 160/78   08/19/20 176/68       General-Well Nourished, Well developed  Eyes - PERRLA  Neck- supple, No mass  CV- regular rate and rhythm, no MRG, No edema  Lung- clear bilaterally  Abd- soft, +BS  Musc/skel - Norm " strength and range of motion  Skin- warm and dry  Neuro - Alert & Oriented x 3, appropriate mood.        Medical problems and test results were reviewed with the patient today.     No results found for this or any previous visit (from the past 672 hour(s)).      EKG: (EKG has been independently visualized by me and summarized below)      ECG 12 Lead    Date/Time: 5/21/2021 10:26 AM  Performed by: Mark Johnson MD  Authorized by: Mark Johnson MD   Comparison: compared with previous ECG   Similar to previous ECG  Rhythm: sinus rhythm  Rate: normal  QRS axis: normal    Clinical impression: normal ECG             ASSESSMENT and PLAN  1. Persistent Afib s/p PVA in Jan 2017.  Well controlled on tikosyn  2. HTN - controlled  3. GERD: - on Protonix.   4. Tikosyn use - Stable Qtc of 442msec  5. Obesity - 30 pound gain in 1 year - Trying to loss wt.         Return in about 6 months (around 11/21/2021).        Mark Johnson M.D., F.A.C.C, F.H.R.S.  Cardiology/Electrophysiology  5/21/2021  10:27 EDT

## 2021-06-17 ENCOUNTER — TELEPHONE (OUTPATIENT)
Dept: CARDIOLOGY | Facility: CLINIC | Age: 61
End: 2021-06-17

## 2021-06-17 NOTE — TELEPHONE ENCOUNTER
----- Message from SUSANA Kumar sent at 6/17/2021  8:47 AM EDT -----  I received a voicemail on his cell phone from patient Renetta Horner stating she is having some issues with A. fib this morning.    Can you please call the patient and find out what her blood pressure and heart rate is.  Also find out if she has had labs since March.  Has she had any steroids recently?

## 2021-06-17 NOTE — TELEPHONE ENCOUNTER
Katelynn Smith APRN Lanum, Emily  Caller: Unspecified (Today,  9:32 AM)  Has she taken all her meds today?         Called pt back, she stated she has already taken all of her meds today. Placed her on hold.           Katelynn Smith APRN Lanum, Emily  Caller: Unspecified (Today,  9:32 AM)  OK if her BP is that high I wonder if she would tolerate another Diltiazem.  If she feels really bad she may need to go to ER.  I can send her in a lower dose of diltiazem to take. Ask what she prefers.  She needs to get some labs too         Informed pt of the above, she stated she would go to [facility] ER.

## 2021-06-17 NOTE — TELEPHONE ENCOUNTER
"I was informed by FO staff that pt has called three more times since she called Katelynn.      Called pt, she stated she woke up at quarter to 6am. She stated she woke up and had a kwame horse in her leg and was wet w/ sweat. States she figured her heart was out of rhythm, . Sat down and that she would be fine, then HR would jump up from 90 to 136. I asked about her BP, she stated she hadn't checked it. I requested that she check it for me while I was on the phone with her: 157/110, was running around 130/80 or so before today.   Pt has not had any labs done since March. Pt denies being on any steroids recently. Pt denies any CP, stated \"when it ramps up, I feel like I need to take a big breath.\"     "

## 2021-07-20 ENCOUNTER — OFFICE VISIT (OUTPATIENT)
Dept: CARDIOLOGY | Facility: CLINIC | Age: 61
End: 2021-07-20

## 2021-07-20 ENCOUNTER — TELEPHONE (OUTPATIENT)
Dept: CARDIOLOGY | Facility: CLINIC | Age: 61
End: 2021-07-20

## 2021-07-20 ENCOUNTER — LAB (OUTPATIENT)
Dept: CARDIOLOGY | Facility: CLINIC | Age: 61
End: 2021-07-20

## 2021-07-20 VITALS
OXYGEN SATURATION: 98 % | WEIGHT: 279 LBS | HEART RATE: 68 BPM | HEIGHT: 66 IN | TEMPERATURE: 97.6 F | BODY MASS INDEX: 44.84 KG/M2 | SYSTOLIC BLOOD PRESSURE: 134 MMHG | DIASTOLIC BLOOD PRESSURE: 80 MMHG

## 2021-07-20 DIAGNOSIS — R00.2 PALPITATIONS: ICD-10-CM

## 2021-07-20 DIAGNOSIS — E78.2 MIXED HYPERLIPIDEMIA: ICD-10-CM

## 2021-07-20 DIAGNOSIS — I48.19 PERSISTENT ATRIAL FIBRILLATION (HCC): ICD-10-CM

## 2021-07-20 DIAGNOSIS — I51.89 GRADE II DIASTOLIC DYSFUNCTION: ICD-10-CM

## 2021-07-20 DIAGNOSIS — I48.19 PERSISTENT ATRIAL FIBRILLATION (HCC): Primary | ICD-10-CM

## 2021-07-20 DIAGNOSIS — R60.9 PERIPHERAL EDEMA: ICD-10-CM

## 2021-07-20 DIAGNOSIS — I10 ESSENTIAL HYPERTENSION: ICD-10-CM

## 2021-07-20 LAB
ANION GAP SERPL CALCULATED.3IONS-SCNC: 12.5 MMOL/L (ref 5–15)
BUN SERPL-MCNC: 12 MG/DL (ref 8–23)
BUN/CREAT SERPL: 21.4 (ref 7–25)
CALCIUM SPEC-SCNC: 9.6 MG/DL (ref 8.6–10.5)
CHLORIDE SERPL-SCNC: 101 MMOL/L (ref 98–107)
CO2 SERPL-SCNC: 25.5 MMOL/L (ref 22–29)
CREAT SERPL-MCNC: 0.56 MG/DL (ref 0.57–1)
GFR SERPL CREATININE-BSD FRML MDRD: 110 ML/MIN/1.73
GLUCOSE SERPL-MCNC: 119 MG/DL (ref 65–99)
MAGNESIUM SERPL-MCNC: 1.8 MG/DL (ref 1.6–2.4)
POTASSIUM SERPL-SCNC: 4.7 MMOL/L (ref 3.5–5.2)
SODIUM SERPL-SCNC: 139 MMOL/L (ref 136–145)
T4 FREE SERPL-MCNC: 1.26 NG/DL (ref 0.93–1.7)
TSH SERPL DL<=0.05 MIU/L-ACNC: 2.19 UIU/ML (ref 0.27–4.2)

## 2021-07-20 PROCEDURE — 84481 FREE ASSAY (FT-3): CPT | Performed by: NURSE PRACTITIONER

## 2021-07-20 PROCEDURE — 84439 ASSAY OF FREE THYROXINE: CPT | Performed by: NURSE PRACTITIONER

## 2021-07-20 PROCEDURE — 83735 ASSAY OF MAGNESIUM: CPT | Performed by: NURSE PRACTITIONER

## 2021-07-20 PROCEDURE — 99214 OFFICE O/P EST MOD 30 MIN: CPT | Performed by: NURSE PRACTITIONER

## 2021-07-20 PROCEDURE — 36415 COLL VENOUS BLD VENIPUNCTURE: CPT

## 2021-07-20 PROCEDURE — 80048 BASIC METABOLIC PNL TOTAL CA: CPT | Performed by: NURSE PRACTITIONER

## 2021-07-20 PROCEDURE — 93000 ELECTROCARDIOGRAM COMPLETE: CPT | Performed by: NURSE PRACTITIONER

## 2021-07-20 PROCEDURE — 84443 ASSAY THYROID STIM HORMONE: CPT | Performed by: NURSE PRACTITIONER

## 2021-07-20 RX ORDER — METOPROLOL TARTRATE 50 MG/1
50 TABLET, FILM COATED ORAL 2 TIMES DAILY PRN
COMMUNITY
End: 2021-10-06

## 2021-07-20 NOTE — PATIENT INSTRUCTIONS
Atrial Fibrillation    Atrial fibrillation is a type of irregular or rapid heartbeat (arrhythmia). In atrial fibrillation, the top part of the heart (atria) beats in an irregular pattern. This makes the heart unable to pump blood normally and effectively.  The goal of treatment is to prevent blood clots from forming, control your heart rate, or restore your heartbeat to a normal rhythm. If this condition is not treated, it can cause serious problems, such as a weakened heart muscle (cardiomyopathy) or a stroke.  What are the causes?  This condition is often caused by medical conditions that damage the heart's electrical system. These include:  · High blood pressure (hypertension). This is the most common cause.  · Certain heart problems or conditions, such as heart failure, coronary artery disease, heart valve problems, or heart surgery.  · Diabetes.  · Overactive thyroid (hyperthyroidism).  · Obesity.  · Chronic kidney disease.  In some cases, the cause of this condition is not known.  What increases the risk?  This condition is more likely to develop in:  · Older people.  · People who smoke.  · Athletes who do endurance exercise.  · People who have a family history of atrial fibrillation.  · Men.  · People who use drugs.  · People who drink a lot of alcohol.  · People who have lung conditions, such as emphysema, pneumonia, or COPD.  · People who have obstructive sleep apnea.  What are the signs or symptoms?  Symptoms of this condition include:  · A feeling that your heart is racing or beating irregularly.  · Discomfort or pain in your chest.  · Shortness of breath.  · Sudden light-headedness or weakness.  · Tiring easily during exercise or activity.  · Fatigue.  · Syncope (fainting).  · Sweating.  In some cases, there are no symptoms.  How is this diagnosed?  Your health care provider may detect atrial fibrillation when taking your pulse. If detected, this condition may be diagnosed with:  · An electrocardiogram  (ECG) to check electrical signals of the heart.  · An ambulatory cardiac monitor to record your heart's activity for a few days.  · A transthoracic echocardiogram (TTE) to create pictures of your heart.  · A transesophageal echocardiogram (YARON) to create even closer pictures of your heart.  · A stress test to check your blood supply while you exercise.  · Imaging tests, such as a CT scan or chest X-ray.  · Blood tests.  How is this treated?  Treatment depends on underlying conditions and how you feel when you experience atrial fibrillation. This condition may be treated with:  · Medicines to prevent blood clots or to treat heart rate or heart rhythm problems.  · Electrical cardioversion to reset the heart's rhythm.  · A pacemaker to correct abnormal heart rhythm.  · Ablation to remove the heart tissue that sends abnormal signals.  · Left atrial appendage closure to seal the area where blood clots can form.  In some cases, underlying conditions will be treated.  Follow these instructions at home:  Medicines  · Take over-the counter and prescription medicines only as told by your health care provider.  · Do not take any new medicines without talking to your health care provider.  · If you are taking blood thinners:  ? Talk with your health care provider before you take any medicines that contain aspirin or NSAIDs, such as ibuprofen. These medicines increase your risk for dangerous bleeding.  ? Take your medicine exactly as told, at the same time every day.  ? Avoid activities that could cause injury or bruising, and follow instructions about how to prevent falls.  ? Wear a medical alert bracelet or carry a card that lists what medicines you take.  Lifestyle         · Do not use any products that contain nicotine or tobacco, such as cigarettes, e-cigarettes, and chewing tobacco. If you need help quitting, ask your health care provider.  · Eat heart-healthy foods. Talk with a dietitian to make an eating plan that is  "right for you.  · Exercise regularly as told by your health care provider.  · Do not drink alcohol.  · Lose weight if you are overweight.  · Do not use drugs, including cannabis.  General instructions  · If you have obstructive sleep apnea, manage your condition as told by your health care provider.  · Do not use diet pills unless your health care provider approves. Diet pills can make heart problems worse.  · Keep all follow-up visits as told by your health care provider. This is important.  Contact a health care provider if you:  · Notice a change in the rate, rhythm, or strength of your heartbeat.  · Are taking a blood thinner and you notice more bruising.  · Tire more easily when you exercise or do heavy work.  · Have a sudden change in weight.  Get help right away if you have:    · Chest pain, abdominal pain, sweating, or weakness.  · Trouble breathing.  · Side effects of blood thinners, such as blood in your vomit, stool, or urine, or bleeding that cannot stop.  · Any symptoms of a stroke. \"BE FAST\" is an easy way to remember the main warning signs of a stroke:  ? B - Balance. Signs are dizziness, sudden trouble walking, or loss of balance.  ? E - Eyes. Signs are trouble seeing or a sudden change in vision.  ? F - Face. Signs are sudden weakness or numbness of the face, or the face or eyelid drooping on one side.  ? A - Arms. Signs are weakness or numbness in an arm. This happens suddenly and usually on one side of the body.  ? S - Speech. Signs are sudden trouble speaking, slurred speech, or trouble understanding what people say.  ? T - Time. Time to call emergency services. Write down what time symptoms started.  · Other signs of a stroke, such as:  ? A sudden, severe headache with no known cause.  ? Nausea or vomiting.  ? Seizure.  These symptoms may represent a serious problem that is an emergency. Do not wait to see if the symptoms will go away. Get medical help right away. Call your local emergency " services (911 in the [place] Do not drive yourself to the hospital.  Summary  · Atrial fibrillation is a type of irregular or rapid heartbeat (arrhythmia).  · Symptoms include a feeling that your heart is beating fast or irregularly.  · You may be given medicines to prevent blood clots or to treat heart rate or heart rhythm problems.  · Get help right away if you have signs or symptoms of a stroke.  · Get help right away if you cannot catch your breath or have chest pain or pressure.  This information is not intended to replace advice given to you by your health care provider. Make sure you discuss any questions you have with your health care provider.  Document Revised: 06/10/2020 Document Reviewed: 06/10/2020  Elsevier Patient Education © 2021 [employer]

## 2021-07-20 NOTE — TELEPHONE ENCOUNTER
Left mess for pt regarding lab results ( all WNL)    TSH   0.270 - 4.200 uIU/mL 2.190      Free T4   0.93 - 1.70 ng/dL 1.26      Magnesium   1.6 - 2.4 mg/dL 1.8      Creatinine   0.57 - 1.00 mg/dL 0.56Low      Sodium   136 - 145 mmol/L 139      Potassium   3.5 - 5.2 mmol/L 4.7      AMY Singh

## 2021-07-20 NOTE — PROGRESS NOTES
Subjective   Renetta Horner is a 60 y.o. female     Chief Complaint   Patient presents with   • Follow-up   • persistent atrial fib       HPI    Problem List:    1. Atrial Fibrillation   1.1 CHADS score 2; patient Pradaxa and Tikosyn  1.2 Event Monitor 4/23-5/3/16 - Afib and Afib with RVR  1.3 Ablation with Dr. Rey 1/18/17  1.4 Cardioversion with Dr. Rey 4/28/17  2. Hypertension  2.1 Echo 4/8/15 - EF > 65%; DD II; trace MR  2.2 Echo 3/3/2021-Global LV systolic function is preserved, some degree of LVH, diastolic dysfunction 1  3. Chest Pain   3.1 stress test 3/3/2021-degraded images, however compatible with moderate size lateral wall infarct, EF 62%, markedly elevated transischemic dilation ratio probably fictitious  3.2 left heart cath 4/5/2021-normal coronary arteries, EF 65%, LVEDP 18-20  4. Diabetes Mellitus II  5. CKD I Dr. Kelly   6.  Covid vaccine, Maderna, first dose 3/1/2021 and second dose will be 3/29/2021    Patient is a 60-year-old female who presents today for follow-up after going to the ER in [facility] due to an episode of A. fib.  She says she was out of rhythm for least 3 and half hours as she woke up out of rhythm.  She says by the time she got to the ER and got to the back she told him not to do an EKG because she could tell she was already back in rhythm.  Her blood pressure was significantly elevated at the time so they gave her some IV metoprolol.  She said they sent her home and metoprolol and she has it to use as needed.  She says she is not had any further episodes.  She says nothing is changed and she is unsure why she had the episode she did.  They did not do any blood work.  She denies any chest pain, presyncope, syncope, orthopnea, PND or edema.  She does have dizziness and lightheadedness on occasion and when she lays down it goes away.  She denies any shortness of breath with activity.  She denies any signs of bleeding or cerebral ischemic events.    Current  Outpatient Medications on File Prior to Visit   Medication Sig Dispense Refill   • dilTIAZem CD (Cartia XT) 240 MG 24 hr capsule Take 1 capsule by mouth Daily. 90 capsule 3   • dofetilide (TIKOSYN) 500 MCG capsule Take 1 capsule by mouth Every 12 (Twelve) Hours. 180 capsule 1   • furosemide (LASIX) 20 MG tablet Take 1 tablet by mouth Daily As Needed (edema). 20 tablet 3   • irbesartan (AVAPRO) 300 MG tablet Take 1 tablet by mouth every night at bedtime. 90 tablet 3   • metFORMIN (GLUCOPHAGE) 500 MG tablet Take 500 mg by mouth Daily With Breakfast.     • metoprolol tartrate (LOPRESSOR) 50 MG tablet Take 50 mg by mouth 2 (Two) Times a Day As Needed. PRN      • potassium chloride 10 MEQ CR tablet Take 1 tablet by mouth Daily As Needed (with lasix). 90 tablet 3   • Pradaxa 150 MG capsu Take 1 capsule by mouth twice daily 180 capsule 3   • vitamin D (ERGOCALCIFEROL) 29100 units capsule capsule Take 50,000 Units by mouth 1 (One) Time Per Week.     • [DISCONTINUED] pantoprazole (PROTONIX) 40 MG EC tablet Take 40 mg by mouth Daily.     • [DISCONTINUED] clopidogrel (PLAVIX) 75 MG tablet Take 75 mg by mouth Daily.       No current facility-administered medications on file prior to visit.       ALLERGIES    Atenolol, Eliquis [apixaban], Indomethacin, Lisinopril, Penicillins, Aspirin, Codeine, Other, Sulfa antibiotics, Ultram [tramadol], and Xarelto [rivaroxaban]    Past Medical History:   Diagnosis Date   • Arthritis    • Atrial fibrillation (CMS/HCC)     CHADS-VASC = 3   • Atrial fibrillation (CMS/HCC) 5/9/2016    1. Persistent Atrial Fibrillation s/p ECV X2 with IRAF and failed Flecainide     A. Diagnosed in 2015. CHADS-VASc = 3 (DM, HTN, female)      B. Xarelto & ASA stopped due to hemarthrosis of knee.        C. Restarted on Pradaxa and no major issues 150 mg BID     D. Echo 4/8/15 showed EF 65%, moderate, Grade II diastolic dysfunction, mild SERA (LA 3.9cm), normal valves     E. Myocardial Perfusion study 4/8/15: negative  for ischemia, EF 68%     • Chest pain    • COVID-19 vaccine administered 03/01/2021    2nd - 03/30/2021 - Moderna    • Diabetes mellitus (CMS/[facility])     on oral agents, Type 2 , Patient states last a1c was 5.9 --DX'D 5 YEARS AGO, CHECKS BLOOD SUGAR DAILY AVERAGE 115-122   • Dizziness    • Fatigue    • GERD (gastroesophageal reflux disease)    • Headache    • Heart murmur    • Hypertension     CONTROLLED WITH MEDS PER PT    • Kidney stone    • Palpitations    • PONV (postoperative nausea and vomiting)    • Wears dentures     UPPER PLATE    • Wears dentures     upper   • Wears eyeglasses     reading       Social History     Socioeconomic History   • Marital status:      Spouse name: Not on file   • Number of children: Not on file   • Years of education: Not on file   • Highest education level: Not on file   Tobacco Use   • Smoking status: Former Smoker     Types: Cigarettes   • Smokeless tobacco: Never Used   • Tobacco comment: QUIT 1997   Vaping Use   • Vaping Use: Never used   Substance and Sexual Activity   • Alcohol use: No   • Drug use: No   • Sexual activity: Defer       Family History   Problem Relation Age of Onset   • Coronary artery disease Mother    • Breast cancer Mother    • Diabetes Mother    • Other Mother         Benign prostatic hypertrophy       Review of Systems   Constitutional: Negative for appetite change, chills, fatigue and fever.   HENT: Negative for congestion and rhinorrhea.    Eyes: Positive for visual disturbance (reading glasses).   Respiratory: Negative for cough, chest tightness, shortness of breath and wheezing.    Cardiovascular: Negative for chest pain, palpitations and leg swelling.   Gastrointestinal: Negative for abdominal pain, blood in stool, constipation, diarrhea, nausea and vomiting.   Endocrine: Positive for heat intolerance (hot flahses ). Negative for cold intolerance.   Genitourinary: Negative for difficulty urinating, dysuria, frequency, hematuria and urgency.  "  Musculoskeletal: Positive for arthralgias (through out her body ) and back pain (lower at times due to arthritis ). Negative for joint swelling, neck pain and neck stiffness.   Skin: Negative for color change, pallor, rash and wound.   Allergic/Immunologic: Positive for food allergies (turkey ). Negative for environmental allergies.   Neurological: Positive for dizziness (when she lays down its better ) and light-headedness (when she lays down for a few seconds ). Negative for weakness, numbness and headaches.   Hematological: Does not bruise/bleed easily.   Psychiatric/Behavioral: Negative for sleep disturbance.       Objective   /80 (BP Location: Left arm)   Pulse 68   Temp 97.6 °F (36.4 °C)   Ht 167.6 cm (66\")   Wt 127 kg (279 lb)   LMP  (LMP Unknown)   SpO2 98%   BMI 45.03 kg/m²   Vitals:    07/20/21 0954   BP: 134/80   BP Location: Left arm   Pulse: 68   Temp: 97.6 °F (36.4 °C)   SpO2: 98%   Weight: 127 kg (279 lb)   Height: 167.6 cm (66\")      Lab Results (most recent)     None        Physical Exam  Vitals reviewed.   Constitutional:       General: She is awake.      Appearance: Normal appearance. She is well-developed and well-groomed. She is morbidly obese.   HENT:      Head: Normocephalic.   Eyes:      General: Lids are normal.   Neck:      Vascular: No carotid bruit, hepatojugular reflux or JVD.   Cardiovascular:      Rate and Rhythm: Normal rate and regular rhythm.      Pulses:           Radial pulses are 2+ on the right side and 2+ on the left side.        Dorsalis pedis pulses are 2+ on the right side and 2+ on the left side.        Posterior tibial pulses are 2+ on the right side and 2+ on the left side.      Heart sounds: Normal heart sounds.   Pulmonary:      Effort: Pulmonary effort is normal.      Breath sounds: Normal breath sounds and air entry.   Abdominal:      General: Bowel sounds are normal.      Palpations: Abdomen is soft.   Musculoskeletal:      Right lower leg: No edema. "      Left lower leg: No edema.   Skin:     General: Skin is warm and dry.   Neurological:      Mental Status: She is alert and oriented to person, place, and time.   Psychiatric:         Attention and Perception: Attention and perception normal.         Mood and Affect: Mood and affect normal.         Speech: Speech normal.         Behavior: Behavior normal. Behavior is cooperative.         Thought Content: Thought content normal.         Cognition and Memory: Cognition and memory normal.         Judgment: Judgment normal.         Procedure     ECG 12 Lead    Date/Time: 7/20/2021 10:27 AM  Performed by: Katelynn Smith APRN  Authorized by: Katelynn Smith APRN   Comparison: compared with previous ECG from 5/21/2021  Similar to previous ECG  Rhythm: sinus rhythm  Rate: normal  BPM: 61  QRS axis: right    Clinical impression: abnormal EKG  Comments: QT/QTc 396/399                 Assessment/Plan      Diagnosis Plan   1. Persistent atrial fibrillation (CMS/HCC)  ECG 12 Lead    Basic Metabolic Panel    Magnesium    TSH    T3, Free    T4, Free   2. Grade II diastolic dysfunction     3. Essential hypertension  ECG 12 Lead   4. Mixed hyperlipidemia     5. Peripheral edema     6. Palpitations  Basic Metabolic Panel    Magnesium    TSH    T3, Free    T4, Free       Return in about 3 months (around 10/20/2021).    Persistent A. fib/palpitations-patient's on diltiazem anticus and.  She has been Toprol to use as needed now she has any episodes.  She is also on Pradaxa.  Diastolic dysfunction/peripheral edema-patient does well with Lasix.  Hypertension-patient's on diltiazem and irbesartan and doing well.  Hyperlipidemia-patient is currently diet controlled.  Patient will get BMP, mag, TSH, free T3 and free T4 today.  She will continue her medication regimen.  She will follow-up in 3 months or sooner if any changes.    We have requested records from [facility].       Renetta TERENCE Horner  reports that she has quit smoking.  Her smoking use included cigarettes. She has never used smokeless tobacco.. Patient brought in medicine list to appointment, it's been reviewed with patient and med list was updated in the chart.     Electronically signed by:

## 2021-07-20 NOTE — TELEPHONE ENCOUNTER
----- Message from SUSANA Kumar sent at 7/20/2021  5:09 PM EDT -----  Please advise patient.  Labs look good.  Forwarded to PCP.

## 2021-07-21 LAB — T3FREE SERPL-MCNC: 2.98 PG/ML (ref 2–4.4)

## 2021-10-06 ENCOUNTER — OFFICE VISIT (OUTPATIENT)
Dept: CARDIOLOGY | Facility: CLINIC | Age: 61
End: 2021-10-06

## 2021-10-06 VITALS
DIASTOLIC BLOOD PRESSURE: 76 MMHG | SYSTOLIC BLOOD PRESSURE: 149 MMHG | HEART RATE: 55 BPM | WEIGHT: 280 LBS | OXYGEN SATURATION: 95 % | TEMPERATURE: 97.6 F | HEIGHT: 66 IN | BODY MASS INDEX: 45 KG/M2

## 2021-10-06 DIAGNOSIS — I48.19 PERSISTENT ATRIAL FIBRILLATION (HCC): Primary | ICD-10-CM

## 2021-10-06 DIAGNOSIS — I51.89 GRADE II DIASTOLIC DYSFUNCTION: ICD-10-CM

## 2021-10-06 DIAGNOSIS — R07.89 OTHER CHEST PAIN: ICD-10-CM

## 2021-10-06 DIAGNOSIS — I10 PRIMARY HYPERTENSION: ICD-10-CM

## 2021-10-06 DIAGNOSIS — E78.2 MIXED HYPERLIPIDEMIA: ICD-10-CM

## 2021-10-06 PROCEDURE — 99214 OFFICE O/P EST MOD 30 MIN: CPT | Performed by: NURSE PRACTITIONER

## 2021-10-06 RX ORDER — NITROGLYCERIN 0.4 MG/1
0.4 TABLET SUBLINGUAL
Qty: 30 TABLET | Refills: 2 | Status: SHIPPED | OUTPATIENT
Start: 2021-10-06

## 2021-10-06 RX ORDER — DOFETILIDE 0.5 MG/1
500 CAPSULE ORAL EVERY 12 HOURS
Qty: 180 CAPSULE | Refills: 3 | Status: SHIPPED | OUTPATIENT
Start: 2021-10-06 | End: 2022-10-27

## 2021-10-06 RX ORDER — NITROGLYCERIN 0.4 MG/1
0.4 TABLET SUBLINGUAL
COMMUNITY
End: 2021-10-06 | Stop reason: SDUPTHER

## 2021-10-06 RX ORDER — PANTOPRAZOLE SODIUM 40 MG/1
40 GRANULE, DELAYED RELEASE ORAL
COMMUNITY
End: 2022-01-21

## 2021-10-06 NOTE — PROGRESS NOTES
Subjective   Renetta Horner is a 60 y.o. female     Chief Complaint   Patient presents with   • Follow-up   • peristent atrial fib       HPI    Problem List:    1. Atrial Fibrillation   1.1 CHADS score 2; patient Pradaxa and Tikosyn  1.2 Event Monitor 4/23-5/3/16 - Afib and Afib with RVR  1.3 Ablation with Dr. Rey 1/18/17  1.4 Cardioversion with Dr. Rey 4/28/17  2. Hypertension  2.1 Echo 4/8/15 - EF > 65%; DD II; trace MR  2.2 Echo 3/3/2021-Global LV systolic function is preserved, some degree of LVH, diastolic dysfunction 1  3. Chest Pain   3.1 stress test 3/3/2021-degraded images, however compatible with moderate size lateral wall infarct, EF 62%, markedly elevated transischemic dilation ratio probably fictitious  3.2 left heart cath 4/5/2021-normal coronary arteries, EF 65%, LVEDP 18-20  4. Diabetes Mellitus II  5. CKD I Dr. Kelly   6.  Covid vaccine, Maderna, first dose 3/1/2021 and second dose will be 3/29/2021    Patient is a 60-year-old female who presents today for follow-up.  She denies any chest pain, pressure, palpitations, fluttering, presyncope, syncope, orthopnea, PND or edema.  She says she has dizziness on occasion when she lays down just for few seconds and then it goes away.  Patient denies any shortness of breath with activity.  She denies any signs of bleeding or cerebral ischemic events.  Patient overall says she has been doing very well.    Current Outpatient Medications on File Prior to Visit   Medication Sig Dispense Refill   • dilTIAZem CD (Cartia XT) 240 MG 24 hr capsule Take 1 capsule by mouth Daily. 90 capsule 3   • furosemide (LASIX) 20 MG tablet Take 1 tablet by mouth Daily As Needed (edema). 20 tablet 3   • irbesartan (AVAPRO) 300 MG tablet Take 1 tablet by mouth every night at bedtime. 90 tablet 3   • metFORMIN (GLUCOPHAGE) 500 MG tablet Take 500 mg by mouth Daily With Breakfast.     • pantoprazole (PROTONIX) 40 MG pack packet Take 40 mg by mouth Every Morning Before  Breakfast.     • potassium chloride 10 MEQ CR tablet Take 1 tablet by mouth Daily As Needed (with lasix). 90 tablet 3   • Pradaxa 150 MG capsu Take 1 capsule by mouth twice daily 180 capsule 3   • vitamin D (ERGOCALCIFEROL) 86650 units capsule capsule Take 50,000 Units by mouth 1 (One) Time Per Week.     • [DISCONTINUED] dofetilide (TIKOSYN) 500 MCG capsule Take 1 capsule by mouth Every 12 (Twelve) Hours. 180 capsule 1   • [DISCONTINUED] nitroglycerin (NITROSTAT) 0.4 MG SL tablet Place 0.4 mg under the tongue Every 5 (Five) Minutes As Needed for Chest Pain. Take no more than 3 doses in 15 minutes.     • [DISCONTINUED] metoprolol tartrate (LOPRESSOR) 50 MG tablet Take 50 mg by mouth 2 (Two) Times a Day As Needed. PRN        No current facility-administered medications on file prior to visit.       ALLERGIES    Atenolol, Eliquis [apixaban], Indomethacin, Lisinopril, Penicillins, Aspirin, Codeine, Other, Sulfa antibiotics, Ultram [tramadol], and Xarelto [rivaroxaban]    Past Medical History:   Diagnosis Date   • Arthritis    • Atrial fibrillation (HCC)     CHADS-VASC = 3   • Atrial fibrillation (HCC) 5/9/2016    1. Persistent Atrial Fibrillation s/p ECV X2 with IRAF and failed Flecainide     A. Diagnosed in 2015. CHADS-VASc = 3 (DM, HTN, female)      B. Xarelto & ASA stopped due to hemarthrosis of knee.        C. Restarted on Pradaxa and no major issues 150 mg BID     D. Echo 4/8/15 showed EF 65%, moderate, Grade II diastolic dysfunction, mild SERA (LA 3.9cm), normal valves     E. Myocardial Perfusion study 4/8/15: negative for ischemia, EF 68%     • Chest pain    • COVID-19 vaccine administered 03/01/2021    2nd - 03/30/2021 - Moderna    • Diabetes mellitus (HCC)     on oral agents, Type 2 , Patient states last a1c was 5.9 --DX'D 5 YEARS AGO, CHECKS BLOOD SUGAR DAILY AVERAGE 115-122   • Dizziness    • Fatigue    • GERD (gastroesophageal reflux disease)    • Headache    • Heart murmur    • Hypertension     CONTROLLED  WITH MEDS PER PT    • Kidney stone    • Palpitations    • PONV (postoperative nausea and vomiting)    • Wears dentures     UPPER PLATE    • Wears dentures     upper   • Wears eyeglasses     reading       Social History     Socioeconomic History   • Marital status:      Spouse name: Not on file   • Number of children: Not on file   • Years of education: Not on file   • Highest education level: Not on file   Tobacco Use   • Smoking status: Former Smoker     Types: Cigarettes   • Smokeless tobacco: Never Used   • Tobacco comment: QUIT 1997   Vaping Use   • Vaping Use: Never used   Substance and Sexual Activity   • Alcohol use: No   • Drug use: No   • Sexual activity: Defer       Family History   Problem Relation Age of Onset   • Coronary artery disease Mother    • Breast cancer Mother    • Diabetes Mother    • Other Mother         Benign prostatic hypertrophy       Review of Systems   Constitutional: Negative for appetite change, chills, fatigue and fever.   HENT: Negative for congestion, rhinorrhea and sore throat.    Eyes: Positive for visual disturbance (READING GLASSES ).   Respiratory: Negative for cough, chest tightness, shortness of breath and wheezing.    Cardiovascular: Negative for chest pain, palpitations and leg swelling.   Gastrointestinal: Negative for abdominal pain, blood in stool, constipation, diarrhea, nausea and vomiting.   Endocrine: Negative for cold intolerance and heat intolerance.   Genitourinary: Negative for difficulty urinating, dysuria, frequency, hematuria and urgency.   Musculoskeletal: Positive for arthralgias (THROUGH OUT THE BODY ). Negative for back pain, joint swelling, neck pain and neck stiffness.   Skin: Negative for color change, pallor, rash and wound.   Allergic/Immunologic: Negative for environmental allergies and food allergies.   Neurological: Positive for dizziness (WHEN SHE LAYS DOWN , IT GOES AWAY IN A FEW SECONDS ). Negative for weakness, light-headedness,  "numbness and headaches.   Hematological: Does not bruise/bleed easily.   Psychiatric/Behavioral: Negative for sleep disturbance.       Objective   /76 (BP Location: Left arm)   Pulse 55   Temp 97.6 °F (36.4 °C)   Ht 167.6 cm (66\")   Wt 127 kg (280 lb)   LMP  (LMP Unknown)   SpO2 95%   BMI 45.19 kg/m²   Vitals:    10/06/21 1031   BP: 149/76   BP Location: Left arm   Pulse: 55   Temp: 97.6 °F (36.4 °C)   SpO2: 95%   Weight: 127 kg (280 lb)   Height: 167.6 cm (66\")      Lab Results (most recent)     None        Physical Exam  Vitals reviewed.   Constitutional:       General: She is awake.      Appearance: Normal appearance. She is well-developed and well-groomed. She is morbidly obese.   HENT:      Head: Normocephalic.   Eyes:      General: Lids are normal.   Neck:      Vascular: No carotid bruit, hepatojugular reflux or JVD.   Cardiovascular:      Rate and Rhythm: Regular rhythm. Bradycardia present.      Pulses:           Radial pulses are 2+ on the right side and 2+ on the left side.        Dorsalis pedis pulses are 2+ on the right side and 2+ on the left side.        Posterior tibial pulses are 2+ on the right side and 2+ on the left side.      Heart sounds: Normal heart sounds.   Pulmonary:      Effort: Pulmonary effort is normal.      Breath sounds: Normal breath sounds and air entry.   Abdominal:      General: Bowel sounds are normal.      Palpations: Abdomen is soft.   Musculoskeletal:      Right lower leg: No edema.      Left lower leg: No edema.   Skin:     General: Skin is warm and dry.   Neurological:      Mental Status: She is alert and oriented to person, place, and time.   Psychiatric:         Attention and Perception: Attention and perception normal.         Mood and Affect: Mood and affect normal.         Speech: Speech normal.         Behavior: Behavior normal. Behavior is cooperative.         Thought Content: Thought content normal.         Cognition and Memory: Cognition and memory " normal.         Judgment: Judgment normal.         Procedure   Procedures         Assessment/Plan      Diagnosis Plan   1. Persistent atrial fibrillation (HCC)  dofetilide (TIKOSYN) 500 MCG capsule   2. Mixed hyperlipidemia     3. Primary hypertension     4. Grade II diastolic dysfunction     5. Other chest pain  nitroglycerin (NITROSTAT) 0.4 MG SL tablet       Return in about 6 months (around 4/6/2022).    Persistent A. fib-patient is on Tikosyn, diltiazem and Pradaxa.  Hyperlipidemia-patient is diet controlled.  Primary hypertension-patient is on diltiazem and irbesartan and doing well per her report.  Diastolic dysfunction-patient has Lasix but no signs of failure.  Chest pain-patient denies any chest pain refilled her nitroglycerin.  She will continue her medication regimen.  She will follow-up in 6 months or sooner if any changes.       Renetta PRATT Evens  reports that she has quit smoking. Her smoking use included cigarettes. She has never used smokeless tobacco..Patient brought in medicine list to appointment, it's been reviewed with patient and med list was updated in the chart.     Electronically signed by:

## 2022-01-04 DIAGNOSIS — I10 ESSENTIAL HYPERTENSION: ICD-10-CM

## 2022-01-04 RX ORDER — IRBESARTAN 300 MG/1
TABLET ORAL
Qty: 90 TABLET | Refills: 0 | Status: SHIPPED | OUTPATIENT
Start: 2022-01-04 | End: 2022-01-04

## 2022-01-04 RX ORDER — IRBESARTAN 300 MG/1
300 TABLET ORAL
Qty: 90 TABLET | Refills: 3 | Status: SHIPPED | OUTPATIENT
Start: 2022-01-04 | End: 2023-01-04

## 2022-01-21 ENCOUNTER — OFFICE VISIT (OUTPATIENT)
Dept: CARDIOLOGY | Facility: CLINIC | Age: 62
End: 2022-01-21

## 2022-01-21 VITALS
DIASTOLIC BLOOD PRESSURE: 84 MMHG | OXYGEN SATURATION: 97 % | BODY MASS INDEX: 44.68 KG/M2 | HEIGHT: 66 IN | HEART RATE: 63 BPM | WEIGHT: 278 LBS | SYSTOLIC BLOOD PRESSURE: 164 MMHG

## 2022-01-21 DIAGNOSIS — I10 PRIMARY HYPERTENSION: ICD-10-CM

## 2022-01-21 DIAGNOSIS — E66.01 MORBID OBESITY WITH BMI OF 40.0-44.9, ADULT: ICD-10-CM

## 2022-01-21 DIAGNOSIS — I48.19 PERSISTENT ATRIAL FIBRILLATION: Primary | ICD-10-CM

## 2022-01-21 PROCEDURE — 99214 OFFICE O/P EST MOD 30 MIN: CPT | Performed by: STUDENT IN AN ORGANIZED HEALTH CARE EDUCATION/TRAINING PROGRAM

## 2022-01-21 PROCEDURE — 93000 ELECTROCARDIOGRAM COMPLETE: CPT | Performed by: STUDENT IN AN ORGANIZED HEALTH CARE EDUCATION/TRAINING PROGRAM

## 2022-01-21 RX ORDER — PANTOPRAZOLE SODIUM 40 MG/1
40 TABLET, DELAYED RELEASE ORAL DAILY
COMMUNITY
Start: 2021-11-19

## 2022-01-21 RX ORDER — LORATADINE 10 MG/1
10 TABLET ORAL DAILY
COMMUNITY
Start: 2022-01-04

## 2022-01-21 RX ORDER — DOXYCYCLINE HYCLATE 100 MG/1
100 CAPSULE ORAL 2 TIMES DAILY
COMMUNITY
Start: 2022-01-14 | End: 2022-10-06

## 2022-01-21 NOTE — PROGRESS NOTES
Cardiac Electrophysiology Outpatient Note  [facility]    Office Visit     Renetta Horner  [id number]  01/21/2022    Primary Care Physician: Gustabo Quach MD    Referred By: No ref. provider found    Subjective     Chief Complaint   Patient presents with   • Atrial Fibrillation       History of Present Illness:   Renetta Horner is a 61 y.o. female who presents to my electrophysiology clinic for follow up of this in atrial fibrillation.  She underwent ablation with Dr. Rey in 2017 consisting of pulmonary vein isolation as well as a box lesion for the posterior wall.  She had recurrences afterward and was started on Tikosyn.  Since then she has done quite well.  She was last seen about 6 months ago and continued since then to do well.  She has not had any symptomatic recurrence of atrial fibrillation.  She checks her blood pressure at home and it is well controlled there.  She has not had problems with the Pradaxa.  She is working on trying to lose weight but has not made much progress yet.    Past Medical History:   Diagnosis Date   • Arthritis    • Atrial fibrillation (HCC)     CHADS-VASC = 3   • Atrial fibrillation (HCC) 5/9/2016    1. Persistent Atrial Fibrillation s/p ECV X2 with IRAF and failed Flecainide     A. Diagnosed in 2015. CHADS-VASc = 3 (DM, HTN, female)      B. Xarelto & ASA stopped due to hemarthrosis of knee.        C. Restarted on Pradaxa and no major issues 150 mg BID     D. Echo 4/8/15 showed EF 65%, moderate, Grade II diastolic dysfunction, mild SERA (LA 3.9cm), normal valves     E. Myocardial Perfusion study 4/8/15: negative for ischemia, EF 68%     • Chest pain    • COVID-19 vaccine administered 03/01/2021    2nd - 03/30/2021 - Moderna    • Diabetes mellitus (HCC)     on oral agents, Type 2 , Patient states last a1c was 5.9 --DX'D 5 YEARS AGO, CHECKS BLOOD SUGAR DAILY AVERAGE 115-122   • Dizziness    • Fatigue    • GERD  (gastroesophageal reflux disease)    • Headache    • Heart murmur    • Hypertension     CONTROLLED WITH MEDS PER PT    • Kidney stone    • Palpitations    • PONV (postoperative nausea and vomiting)    • Wears dentures     UPPER PLATE    • Wears dentures     upper   • Wears eyeglasses     reading       Past Surgical History:   Procedure Laterality Date   • ANKLE SURGERY      right    • CARDIAC ELECTROPHYSIOLOGY PROCEDURE N/A 2017    Procedure: Schedule for a PVA. Cancel the Tikosyn admission.;  Surgeon: Geronimo Rey DO;  Location: [facility] INVASIVE LOCATION;  Service:    • CHOLECYSTECTOMY     • COLONOSCOPY     • HYSTERECTOMY     • JOINT REPLACEMENT      right knee,    • KNEE SURGERY Right     KNEE REPLACEMENT        Family History   Problem Relation Age of Onset   • Coronary artery disease Mother    • Breast cancer Mother    • Diabetes Mother    • Other Mother         Benign prostatic hypertrophy       Social History     Socioeconomic History   • Marital status:    Tobacco Use   • Smoking status: Former Smoker     Packs/day: 0.25     Types: Cigarettes     Quit date:      Years since quittin.0   • Smokeless tobacco: Never Used   Vaping Use   • Vaping Use: Never used   Substance and Sexual Activity   • Alcohol use: No   • Drug use: No   • Sexual activity: Defer         Current Outpatient Medications:   •  dilTIAZem CD (Cartia XT) 240 MG 24 hr capsule, Take 1 capsule by mouth Daily., Disp: 90 capsule, Rfl: 3  •  dofetilide (TIKOSYN) 500 MCG capsule, Take 1 capsule by mouth Every 12 (Twelve) Hours., Disp: 180 capsule, Rfl: 3  •  doxycycline (VIBRAMYCIN) 100 MG capsule, Take 100 mg by mouth 2 (Two) Times a Day., Disp: , Rfl:   •  furosemide (LASIX) 20 MG tablet, Take 1 tablet by mouth Daily As Needed (edema)., Disp: 20 tablet, Rfl: 3  •  irbesartan (AVAPRO) 300 MG tablet, Take 1 tablet by mouth every night at bedtime., Disp: 90 tablet, Rfl: 3  •  loratadine (CLARITIN) 10 MG tablet, Take 10 mg  "by mouth Daily., Disp: , Rfl:   •  metFORMIN (GLUCOPHAGE) 1000 MG tablet, Take 1,000 mg by mouth 2 (Two) Times a Day., Disp: , Rfl:   •  nitroglycerin (NITROSTAT) 0.4 MG SL tablet, Place 1 tablet under the tongue Every 5 (Five) Minutes As Needed for Chest Pain. Take no more than 3 doses in 15 minutes., Disp: 30 tablet, Rfl: 2  •  pantoprazole (PROTONIX) 40 MG EC tablet, Take 40 mg by mouth Daily., Disp: , Rfl:   •  potassium chloride 10 MEQ CR tablet, Take 1 tablet by mouth Daily As Needed (with lasix)., Disp: 90 tablet, Rfl: 3  •  Pradaxa 150 MG capsu, Take 1 capsule by mouth twice daily, Disp: 180 capsule, Rfl: 3  •  vitamin D (ERGOCALCIFEROL) 19496 units capsule capsule, Take 50,000 Units by mouth 1 (One) Time Per Week., Disp: , Rfl:     Allergies:   Allergies   Allergen Reactions   • Atenolol Shortness Of Breath     Hard to breath   • Eliquis [Apixaban] GI Bleeding   • Indomethacin Hives and Shortness Of Breath     , HIVES    • Lisinopril Shortness Of Breath   • Penicillins Anaphylaxis   • Aspirin GI Intolerance   • Codeine Itching and Nausea Only     ITCHING    • Other Itching     [place]-- hives and vomiting   • Sulfa Antibiotics Nausea And Vomiting   • Ultram [Tramadol] Nausea And Vomiting   • Xarelto [Rivaroxaban] Other (See Comments)     Bleeding knee       Objective   Vital Signs: Blood pressure 164/84, pulse 63, height 167.6 cm (66\"), weight 126 kg (278 lb), SpO2 97 %.    PHYSICAL EXAM  General appearance: Awake, alert, cooperative, obese  Head: Normocephalic, without obvious abnormality, atraumatic  Neck: No JVD  Lungs: Clear to ascultation bilaterally  Heart: Regular rate and rhythm, no murmurs, 2+ LE pulses, no lower extremity swelling  Skin: Skin color, turgor normal, no rashes or lesions  Neurologic: Grossly normal     Lab Results   Component Value Date    GLUCOSE 119 (H) 07/20/2021    CALCIUM 9.6 07/20/2021     07/20/2021    K 4.7 07/20/2021    CO2 25.5 07/20/2021     07/20/2021    BUN 12 " 07/20/2021    CREATININE 0.56 (L) 07/20/2021    EGFRIFNONA 110 07/20/2021    BCR 21.4 07/20/2021    ANIONGAP 12.5 07/20/2021     Lab Results   Component Value Date    WBC 8.86 03/15/2021    HGB 12.8 03/15/2021    HCT 40.4 03/15/2021    MCV 90.0 03/15/2021     03/15/2021     Lab Results   Component Value Date    INR 1.12 01/17/2017    PROTIME 12.2 (H) 01/17/2017     Lab Results   Component Value Date    TSH 2.190 07/20/2021          Results for orders placed during the hospital encounter of 03/03/21    Adult Transthoracic Echo Complete W/ Cont if Necessary Per Protocol    Interpretation Summary  Technically limited study.    1.  Global LV systolic function is grossly preserved.  Formal regional wall motion assessment cannot be performed.  There is some degree of left ventricular hypertrophy present with grade 1A diastolic dysfunction.  Mild left atrial enlargement.  Right heart chambers are normal.    2.  Valves are grossly morphologically and physiologically normal.    3.  No pericardial or great vessel pathology.    4.  Pulmonary artery pressures cannot be calculated.         I personally viewed and interpreted the patient's EKG/Telemetry/lab data      ECG 12 Lead    Date/Time: 1/21/2022 10:41 AM  Performed by: Porsper Rausch MD  Authorized by: Prosper Rausch MD   Comparison: compared with previous ECG from 7/20/2021  Similar to previous ECG  Comments: Normal sinus rhythm without significant abnormality.  QTC measured at 452 ms            Renetta Horner  reports that she quit smoking about 25 years ago. Her smoking use included cigarettes. She smoked 0.25 packs per day. She has never used smokeless tobacco..       Advance Care Planning   Advance Care Planning: ACP discussion was held with the patient during this visit. Patient does not have an advance directive, declines further assistance.     Assessment & Plan    1. Persistent atrial fibrillation (HCC)  She has a history of persistent atrial  fibrillation status post ablation with Dr. Rey in 2017 consisting of pulmonary vein isolation and posterior box isolation.  She had recurrence after this is been maintained on Tikosyn.  She has done well without recent recurrences.  Her QTC today was measured at 452 ms.  We will plan to continue Tikosyn at the current dose.  We will plan to get an EKG every 6 months for monitoring of this.  We will continue on Pradaxa    2. Primary hypertension  Blood pressure is elevated today, but she says she checks it regularly at home and has been quite normal.  Most of her other appointments has been well controlled also.  We will plan to continue her Cardizem and irbesartan today.    3. Morbid obesity with BMI of 40.0-44.9, adult (HCC)  We discussed the importance of weight loss and trying to limit the episodes of atrial fibrillation going forward.  She is getting continue to work on this.       Follow Up:  Return in about 1 year (around 1/21/2023) for Recheck.      Thank you for allowing me to participate in the care of your patient. Please do not hesitate to contact me with additional questions or concerns.      Prosper Rausch M.D.  Cardiac Electrophysiologist  [facility] / [facility]

## 2022-01-31 DIAGNOSIS — I48.0 PAROXYSMAL ATRIAL FIBRILLATION: ICD-10-CM

## 2022-01-31 RX ORDER — ATENOLOL 100 MG/1
TABLET ORAL
Qty: 180 CAPSULE | Refills: 3 | Status: SHIPPED | OUTPATIENT
Start: 2022-01-31 | End: 2023-01-26

## 2022-04-04 DIAGNOSIS — I48.0 PAROXYSMAL ATRIAL FIBRILLATION: ICD-10-CM

## 2022-04-04 DIAGNOSIS — R60.9 PERIPHERAL EDEMA: ICD-10-CM

## 2022-04-04 RX ORDER — DILTIAZEM HYDROCHLORIDE 240 MG/1
CAPSULE, COATED, EXTENDED RELEASE ORAL
Qty: 90 CAPSULE | Refills: 0 | Status: SHIPPED | OUTPATIENT
Start: 2022-04-04 | End: 2022-07-05

## 2022-04-06 ENCOUNTER — OFFICE VISIT (OUTPATIENT)
Dept: CARDIOLOGY | Facility: CLINIC | Age: 62
End: 2022-04-06

## 2022-04-06 VITALS
WEIGHT: 274 LBS | SYSTOLIC BLOOD PRESSURE: 138 MMHG | OXYGEN SATURATION: 97 % | HEART RATE: 70 BPM | DIASTOLIC BLOOD PRESSURE: 78 MMHG | BODY MASS INDEX: 44.03 KG/M2 | HEIGHT: 66 IN

## 2022-04-06 DIAGNOSIS — I48.19 PERSISTENT ATRIAL FIBRILLATION: ICD-10-CM

## 2022-04-06 DIAGNOSIS — R60.9 PERIPHERAL EDEMA: ICD-10-CM

## 2022-04-06 DIAGNOSIS — I51.89 GRADE II DIASTOLIC DYSFUNCTION: ICD-10-CM

## 2022-04-06 DIAGNOSIS — E78.2 MIXED HYPERLIPIDEMIA: ICD-10-CM

## 2022-04-06 DIAGNOSIS — I10 PRIMARY HYPERTENSION: Primary | ICD-10-CM

## 2022-04-06 PROCEDURE — 99214 OFFICE O/P EST MOD 30 MIN: CPT | Performed by: NURSE PRACTITIONER

## 2022-04-06 RX ORDER — FUROSEMIDE 20 MG/1
20 TABLET ORAL DAILY PRN
Qty: 90 TABLET | Refills: 3 | Status: SHIPPED | OUTPATIENT
Start: 2022-04-06

## 2022-04-06 NOTE — PROGRESS NOTES
Subjective   Renetta Hornre is a 61 y.o. female     Chief Complaint   Patient presents with   • Persistent atrial fibrillation    • Mixed hyperlipidemia       HPI    Problem List:    1. Atrial Fibrillation   1.1 CHADS score 2; patient Pradaxa and Tikosyn  1.2 Event Monitor 4/23-5/3/16 - Afib and Afib with RVR  1.3 Ablation with Dr. Rey 1/18/17  1.4 Cardioversion with Dr. Rey 4/28/17  2. Hypertension  2.1 Echo 4/8/15 - EF > 65%; DD II; trace MR  2.2 Echo 3/3/2021-Global LV systolic function is preserved, some degree of LVH, diastolic dysfunction 1  3. Chest Pain   3.1 stress test 3/3/2021-degraded images, however compatible with moderate size lateral wall infarct, EF 62%, markedly elevated transischemic dilation ratio probably fictitious  3.2 left heart cath 4/5/2021-normal coronary arteries, EF 65%, LVEDP 18-20  4. Diabetes Mellitus II  5. CKD I Dr. Kelly   6.  Covid vaccine, Maderna, first dose 3/1/2021 and second dose will be 3/29/2021    Patient is a 61-year-old female who presents today for a follow-up.  She denies any chest pain, pressure, presyncope, syncope, orthopnea, PND or edema.  Her last episode of palpitations were prior to her coming to her last appointment here.  Patient says the only time she notices any dizziness is when she lays down.  She never has it during the day.  She says she has been doing very well.  She denies any shortness of breath with activity.  She denies any signs of bleeding or cerebral ischemic events.  She is injured her left knee and looking for an orthopedist because [employer] no longer takes her insurance.    We went over her labs.  Last LDL was 127, A1c was 7.0 and LFTs were slightly elevated but that is due to the fact that she can only take Tylenol.      Current Outpatient Medications on File Prior to Visit   Medication Sig Dispense Refill   • dilTIAZem CD (CARDIZEM CD) 240 MG 24 hr capsule Take 1 capsule by mouth once daily 90 capsule 0   • dofetilide  (TIKOSYN) 500 MCG capsule Take 1 capsule by mouth Every 12 (Twelve) Hours. 180 capsule 3   • doxycycline (VIBRAMYCIN) 100 MG capsule Take 100 mg by mouth 2 (Two) Times a Day.     • irbesartan (AVAPRO) 300 MG tablet Take 1 tablet by mouth every night at bedtime. 90 tablet 3   • loratadine (CLARITIN) 10 MG tablet Take 10 mg by mouth Daily.     • metFORMIN (GLUCOPHAGE) 1000 MG tablet Take 1,000 mg by mouth 2 (Two) Times a Day.     • nitroglycerin (NITROSTAT) 0.4 MG SL tablet Place 1 tablet under the tongue Every 5 (Five) Minutes As Needed for Chest Pain. Take no more than 3 doses in 15 minutes. 30 tablet 2   • pantoprazole (PROTONIX) 40 MG EC tablet Take 40 mg by mouth Daily.     • potassium chloride 10 MEQ CR tablet Take 1 tablet by mouth Daily As Needed (with lasix). 90 tablet 3   • Pradaxa 150 MG capsu Take 1 capsule by mouth twice daily 180 capsule 3   • vitamin D (ERGOCALCIFEROL) 78954 units capsule capsule Take 50,000 Units by mouth 1 (One) Time Per Week.     • [DISCONTINUED] furosemide (LASIX) 20 MG tablet Take 1 tablet by mouth Daily As Needed (edema). 20 tablet 3     No current facility-administered medications on file prior to visit.       ALLERGIES    Atenolol, Eliquis [apixaban], Indomethacin, Lisinopril, Penicillins, Aspirin, Codeine, Other, Sulfa antibiotics, Ultram [tramadol], and Xarelto [rivaroxaban]    Past Medical History:   Diagnosis Date   • Arthritis    • Atrial fibrillation (HCC)     CHADS-VASC = 3   • Atrial fibrillation (HCC) 5/9/2016    1. Persistent Atrial Fibrillation s/p ECV X2 with IRAF and failed Flecainide     A. Diagnosed in 2015. CHADS-VASc = 3 (DM, HTN, female)      B. Xarelto & ASA stopped due to hemarthrosis of knee.        C. Restarted on Pradaxa and no major issues 150 mg BID     D. Echo 4/8/15 showed EF 65%, moderate, Grade II diastolic dysfunction, mild SERA (LA 3.9cm), normal valves     E. Myocardial Perfusion study 4/8/15: negative for ischemia, EF 68%     • Chest pain    •  COVID-19 vaccine administered 2021    2nd - 2021 - Moderna    • Diabetes mellitus (HCC)     on oral agents, Type 2 , Patient states last a1c was 5.9 --DX'D 5 YEARS AGO, CHECKS BLOOD SUGAR DAILY AVERAGE 115-122   • Dizziness    • Fatigue    • GERD (gastroesophageal reflux disease)    • Headache    • Heart murmur    • Hypertension     CONTROLLED WITH MEDS PER PT    • Kidney stone    • Palpitations    • PONV (postoperative nausea and vomiting)    • Wears dentures     UPPER PLATE    • Wears dentures     upper   • Wears eyeglasses     reading       Social History     Socioeconomic History   • Marital status:    Tobacco Use   • Smoking status: Former Smoker     Packs/day: 0.25     Types: Cigarettes     Quit date:      Years since quittin.2   • Smokeless tobacco: Never Used   Vaping Use   • Vaping Use: Never used   Substance and Sexual Activity   • Alcohol use: No   • Drug use: No   • Sexual activity: Defer       Family History   Problem Relation Age of Onset   • Coronary artery disease Mother    • Breast cancer Mother    • Diabetes Mother    • Other Mother         Benign prostatic hypertrophy       Review of Systems   Constitutional: Negative for chills, fatigue and fever.   HENT: Negative for congestion and sinus pressure.    Eyes: Negative for visual disturbance.   Respiratory: Negative for chest tightness and shortness of breath.    Cardiovascular: Positive for palpitations (last one was when she went to hospital before her last visit here). Negative for chest pain and leg swelling.   Gastrointestinal: Negative for abdominal pain, blood in stool, constipation, diarrhea, nausea and vomiting.   Endocrine: Negative for cold intolerance and heat intolerance.   Genitourinary: Negative for dysuria, frequency, hematuria and urgency.   Musculoskeletal: Negative for back pain and neck pain.   Skin: Negative for rash.   Allergic/Immunologic: Positive for food allergies (turkey). Negative for  "environmental allergies.   Neurological: Positive for dizziness (when laying down at times). Negative for syncope and light-headedness.   Hematological: Does not bruise/bleed easily.   Psychiatric/Behavioral: Negative for sleep disturbance (denies waking with soa or cp).       Objective   /78 (BP Location: Left arm, Patient Position: Sitting) Comment (Patient Position): manual  Pulse 70   Ht 167.6 cm (66\")   Wt 124 kg (274 lb)   LMP  (LMP Unknown)   SpO2 97%   BMI 44.22 kg/m²   Vitals:    04/06/22 1256 04/06/22 1305 04/06/22 1311   BP: 177/73 166/81 138/78   BP Location: Left arm Right arm Left arm   Patient Position: Sitting Sitting Sitting   Pulse: 70     SpO2: 97%     Weight: 124 kg (274 lb)     Height: 167.6 cm (66\")        Lab Results (most recent)     None        Physical Exam  Vitals reviewed.   Constitutional:       General: She is awake.      Appearance: Normal appearance. She is well-developed and well-groomed. She is morbidly obese.   HENT:      Head: Normocephalic.   Eyes:      General: Lids are normal.   Neck:      Vascular: No carotid bruit, hepatojugular reflux or JVD.   Cardiovascular:      Rate and Rhythm: Normal rate and regular rhythm.      Pulses:           Radial pulses are 2+ on the right side and 2+ on the left side.        Dorsalis pedis pulses are 2+ on the right side and 2+ on the left side.        Posterior tibial pulses are 2+ on the right side and 2+ on the left side.      Heart sounds: Normal heart sounds.   Pulmonary:      Effort: Pulmonary effort is normal.      Breath sounds: Normal breath sounds and air entry.   Abdominal:      General: Bowel sounds are normal.      Palpations: Abdomen is soft.   Musculoskeletal:      Right lower leg: No edema.      Left lower leg: Tenderness present. No edema.   Skin:     General: Skin is warm and dry.   Neurological:      Mental Status: She is alert and oriented to person, place, and time.   Psychiatric:         Attention and " Perception: Attention and perception normal.         Mood and Affect: Mood and affect normal.         Speech: Speech normal.         Behavior: Behavior normal. Behavior is cooperative.         Thought Content: Thought content normal.         Cognition and Memory: Cognition and memory normal.         Judgment: Judgment normal.         Procedure   Procedures         Assessment/Plan      Diagnosis Plan   1. Primary hypertension     2. Peripheral edema  furosemide (LASIX) 20 MG tablet   3. Persistent atrial fibrillation (HCC)     4. Mixed hyperlipidemia     5. Grade II diastolic dysfunction         Return in about 6 months (around 10/6/2022).  Hypertension-patient's doing well on diltiazem and irbesartan.  Peripheral edema/diastolic dysfunction-patient is doing well on Lasix.  A. fib-patient's on Tikosyn, diltiazem and Pradaxa and doing well.  Hyperlipidemia-patient is just diet controlled and doing well.  She will continue her medication regimen.  She will follow-up in 6 months or sooner if any changes.       Patient did not bring med list or medicine bottles to appointment, med list has been reviewed and updated based on patient's knowledge of their meds.       Electronically signed by:

## 2022-04-19 ENCOUNTER — TELEPHONE (OUTPATIENT)
Dept: CARDIOLOGY | Facility: CLINIC | Age: 62
End: 2022-04-19

## 2022-04-19 RX ORDER — NAPROXEN 500 MG/1
500 TABLET ORAL DAILY PRN
Qty: 30 TABLET | Refills: 0 | Status: SHIPPED | OUTPATIENT
Start: 2022-04-19

## 2022-04-19 NOTE — TELEPHONE ENCOUNTER
Pt called asking if there is anything she can take for her knee due to her PCP told her she was not aloud to take any meds due to her blood thinner .     [phone]     AMY Singh    Per SUSANA Del Rio     Usually can take on occasion with food, NSAID, but not freq  Can also use Tylenol arthritis or rapid release tylenol if  no liver issues.     Pt was advised of the above mess and [facility]

## 2022-04-27 ENCOUNTER — OFFICE VISIT (OUTPATIENT)
Dept: ORTHOPEDIC SURGERY | Facility: CLINIC | Age: 62
End: 2022-04-27

## 2022-04-27 VITALS
SYSTOLIC BLOOD PRESSURE: 164 MMHG | HEIGHT: 66 IN | BODY MASS INDEX: 43.93 KG/M2 | DIASTOLIC BLOOD PRESSURE: 78 MMHG | WEIGHT: 273.37 LBS

## 2022-04-27 DIAGNOSIS — M17.12 PRIMARY OSTEOARTHRITIS OF LEFT KNEE: Primary | ICD-10-CM

## 2022-04-27 PROCEDURE — 99204 OFFICE O/P NEW MOD 45 MIN: CPT | Performed by: ORTHOPAEDIC SURGERY

## 2022-04-27 PROCEDURE — 20610 DRAIN/INJ JOINT/BURSA W/O US: CPT | Performed by: ORTHOPAEDIC SURGERY

## 2022-04-27 RX ORDER — LIDOCAINE HYDROCHLORIDE 10 MG/ML
4 INJECTION, SOLUTION EPIDURAL; INFILTRATION; INTRACAUDAL; PERINEURAL
Status: COMPLETED | OUTPATIENT
Start: 2022-04-27 | End: 2022-04-27

## 2022-04-27 RX ORDER — TRIAMCINOLONE ACETONIDE 40 MG/ML
40 INJECTION, SUSPENSION INTRA-ARTICULAR; INTRAMUSCULAR
Status: COMPLETED | OUTPATIENT
Start: 2022-04-27 | End: 2022-04-27

## 2022-04-27 RX ADMIN — TRIAMCINOLONE ACETONIDE 40 MG: 40 INJECTION, SUSPENSION INTRA-ARTICULAR; INTRAMUSCULAR at 10:44

## 2022-04-27 RX ADMIN — LIDOCAINE HYDROCHLORIDE 4 ML: 10 INJECTION, SOLUTION EPIDURAL; INFILTRATION; INTRACAUDAL; PERINEURAL at 10:44

## 2022-04-27 NOTE — PROGRESS NOTES
Procedure   Large Joint Arthrocentesis: L knee  Date/Time: 4/27/2022 10:44 AM  Consent given by: patient  Site marked: site marked  Timeout: Immediately prior to procedure a time out was called to verify the correct patient, procedure, equipment, support staff and site/side marked as required   Supporting Documentation  Indications: pain   Procedure Details  Location: knee - L knee  Preparation: Patient was prepped and draped in the usual sterile fashion  Needle size: 22 G  Approach: anterolateral  Medications administered: 40 mg triamcinolone acetonide 40 MG/ML; 4 mL lidocaine PF 1% 1 % (1cc .75% Marcaine NDC: 8991-9917-17 LOT: [id number] EXP: 04/01/2023)  Patient tolerance: patient tolerated the procedure well with no immediate complications

## 2022-04-27 NOTE — PROGRESS NOTES
[facility] [place] [facility] Note    Subjective     CC: Pain of the Left Knee      HPI    Renetta Horner is a 61 y.o. female who presents with new problem of: left knee pain.  Onset: atraumatic and gradual in nature. The issue has been ongoing for 3 week(s). Pain is a 8/10 on the pain scale. Pain is described as aching. Associated symptoms include pain, swelling and stiffness. The pain is worse with walking and rising from seated position; resting and heat improve the pain. Previous treatments have included: cane/walker and NSAIDS.    I have reviewed the following portions of the patient's history:History of Present Illness and review of systems.      She has a history of heart problems.  A. fib.       Review of Systems   Constitutional: Negative.  Negative for chills, fatigue and fever.   HENT: Negative.  Negative for congestion and dental problem.    Eyes: Negative.  Negative for blurred vision.   Respiratory: Negative.  Negative for shortness of breath.    Cardiovascular: Negative.  Negative for leg swelling.   Gastrointestinal: Negative.  Negative for abdominal pain.   Endocrine: Negative.  Negative for polyuria.   Genitourinary: Negative.  Negative for difficulty urinating.   Musculoskeletal: Positive for arthralgias.   Skin: Negative.    Allergic/Immunologic: Negative.    Neurological: Negative.    Hematological: Negative.  Negative for adenopathy.   Psychiatric/Behavioral: Negative.  Negative for behavioral problems.       ROS:    Constiutional:Pt denies fever, chills, nausea, or vomiting.  MSK:as above      Objective      Past Medical History  Past Medical History:   Diagnosis Date   • Arthritis    • Atrial fibrillation (HCC)     CHADS-VASC = 3   • Atrial fibrillation (HCC) 5/9/2016    1. Persistent Atrial Fibrillation s/p ECV X2 with IRAF and failed Flecainide     A. Diagnosed in 2015. CHADS-VASc = 3 (DM, HTN, female)      B. Xarelto & ASA stopped due to hemarthrosis of knee.        C.  "Restarted on Pradaxa and no major issues 150 mg BID     D. Echo 4/8/15 showed EF 65%, moderate, Grade II diastolic dysfunction, mild SERA (LA 3.9cm), normal valves     E. Myocardial Perfusion study 4/8/15: negative for ischemia, EF 68%     • Chest pain    • COVID-19 vaccine administered 03/01/2021    2nd - 03/30/2021 - Moderna    • Diabetes mellitus (HCC)     on oral agents, Type 2 , Patient states last a1c was 5.9 --DX'D 5 YEARS AGO, CHECKS BLOOD SUGAR DAILY AVERAGE 115-122   • Dizziness    • Fatigue    • GERD (gastroesophageal reflux disease)    • Headache    • Heart murmur    • Hypertension     CONTROLLED WITH MEDS PER PT    • Kidney stone    • Palpitations    • PONV (postoperative nausea and vomiting)    • Wears dentures     UPPER PLATE    • Wears dentures     upper   • Wears eyeglasses     reading         Physical Exam  /78   Ht 167.6 cm (65.98\")   Wt 124 kg (273 lb 5.9 oz)   LMP  (LMP Unknown)   BMI 44.14 kg/m²     Body mass index is 44.14 kg/m².    Patient is well nourished and well developed.        Ortho Exam  Well-healed right total knee scar.  Left knee range of motion 10 to 120 degrees.  Stable ligaments.  Trace effusion    Imaging/Labs/EMG Reviewed:  Imaging Results (Last 24 Hours)     ** No results found for the last 24 hours. **        I viewed her radiographs from April 7 which show tricompartmental arthritis left knee    Assessment:  1. Primary osteoarthritis of left knee        Plan:  1. Recommend over the counter anti-inflammatories for pain and/or swelling  2. I injected left knee with cortisone.  She tolerated injection well.    Follow Up:   Return if symptoms worsen or fail to improve.      Medical Decision Making  Management Options : Low - 1 Acute, Uncomplicated illness or injury  and Moderate - RX Drug management         Perfecto Holland M.D., FAAOS  Orthopedic Surgeon  Fellowship Trained Sports Medicine  [facility]  Orthopedics and Sports Medicine  [address], [address]  [place] [address]    EMR Dragon/Transcription disclaimer:  Much of this encounter note is an electronic transcription of spoken language to printed text. Electronic transcription of spoken language may permit erroneous, or at times, nonsensical words or phrases to be inadvertently transcribed. Although I have reviewed the note for such errors, some may still exist.

## 2022-05-13 NOTE — PROGRESS NOTES
Subjective   Renetta Horner is a 59 y.o. female     Chief Complaint   Patient presents with   • Hypertension       HPI    Problem List:    1. Atrial Fibrillation   1.1 CHADS score 2; patient Pradaxa and Tikosyn  1.2 Event Monitor 4/23-5/3/16 - Afib and Afib with RVR  1.3 Ablation with Dr. Rey 1/18/17  1.4 Cardioversion with Dr. Rey 4/28/17  2. Hypertension  2.1 Echo 4/8/15 - EF > 65%; DD II; trace MR  3. Chest Pain   3.1 Stress Test 4/8/15 - no ischemia   4. Diabetes Mellitus II  5. CKD I Dr. Kelly     Patient is a 59-year-old female who presents today for follow-up with her  at her side.  She denies any chest pain, pressure, palpitations, fluttering, dizziness, presyncope, syncope, orthopnea, PND or edema.  She denies any shortness of breath with activity.  She was just at her regular doctor's office a week ago and her blood pressure systolic was 125.  She says it is been doing fine.  She denies any signs of bleeding or cerebral ischemic events.    Current Outpatient Medications on File Prior to Visit   Medication Sig Dispense Refill   • docusate sodium (COLACE) 100 MG capsule Take 200 mg by mouth Daily.     • dofetilide (TIKOSYN) 500 MCG capsule TAKE 1 CAPSULE BY MOUTH EVERY 12 HOURS 180 capsule 3   • furosemide (LASIX) 20 MG tablet Take 1 tablet by mouth Daily As Needed (edema). 20 tablet 3   • metFORMIN (GLUCOPHAGE) 500 MG tablet Take 500 mg by mouth Daily With Breakfast.     • pantoprazole (PROTONIX) 40 MG EC tablet Take 40 mg by mouth Daily.     • PRADAXA 150 MG capsu TAKE 1 CAPSULE BY MOUTH TWICE DAILY 60 capsule 11   • vitamin D (ERGOCALCIFEROL) 92257 units capsule capsule Take 50,000 Units by mouth 1 (One) Time Per Week.     • [DISCONTINUED] CARTIA  MG 24 hr capsule TAKE 1 CAPSULE BY MOUTH ONCE DAILY 90 capsule 3   • [DISCONTINUED] irbesartan (AVAPRO) 300 MG tablet TAKE ONE TABLET BY MOUTH EVERY NIGHT 30 tablet 5   • [DISCONTINUED] omeprazole (priLOSEC) 20 MG capsule Take 20 mg by  mouth Daily.  2     No current facility-administered medications on file prior to visit.        ALLERGIES    Atenolol; Indomethacin; Lisinopril; Penicillins; Codeine; Other; Sulfa antibiotics; Ultram [tramadol]; and Xarelto [rivaroxaban]    Past Medical History:   Diagnosis Date   • Arthritis    • Atrial fibrillation (CMS/HCC)     CHADS-VASC = 3   • Atrial fibrillation (CMS/HCC) 5/9/2016    1. Persistent Atrial Fibrillation s/p ECV X2 with IRAF and failed Flecainide     A. Diagnosed in 2015. CHADS-VASc = 3 (DM, HTN, female)      B. Xarelto & ASA stopped due to hemarthrosis of knee.        C. Restarted on Pradaxa and no major issues 150 mg BID     D. Echo 4/8/15 showed EF 65%, moderate, Grade II diastolic dysfunction, mild SERA (LA 3.9cm), normal valves     E. Myocardial Perfusion study 4/8/15: negative for ischemia, EF 68%     • Chest pain    • Diabetes mellitus (CMS/HCC)     on oral agents, Type 2 , Patient states last a1c was 5.9 --DX'D 5 YEARS AGO, CHECKS BLOOD SUGAR DAILY AVERAGE 115-122   • Dizziness    • Fatigue    • GERD (gastroesophageal reflux disease)    • Headache    • Heart murmur    • Hypertension     CONTROLLED WITH MEDS PER PT    • Kidney stone    • Palpitations    • PONV (postoperative nausea and vomiting)    • Wears dentures     UPPER PLATE    • Wears dentures     upper   • Wears eyeglasses     reading       Social History     Socioeconomic History   • Marital status:      Spouse name: Not on file   • Number of children: Not on file   • Years of education: Not on file   • Highest education level: Not on file   Tobacco Use   • Smoking status: Former Smoker     Types: Cigarettes   • Smokeless tobacco: Never Used   • Tobacco comment: QUIT 1997   Substance and Sexual Activity   • Alcohol use: No   • Drug use: No   • Sexual activity: Defer       Family History   Problem Relation Age of Onset   • Coronary artery disease Mother    • Breast cancer Mother    • Diabetes Mother    • Other Mother          "Benign prostatic hypertrophy       Review of Systems   Constitutional: Negative for diaphoresis and fatigue.   HENT: Negative for congestion, rhinorrhea and sneezing.    Eyes: Positive for visual disturbance (WEARS READING GLASSES PRN).   Respiratory: Negative for cough, chest tightness, shortness of breath and wheezing.    Cardiovascular: Negative for chest pain, palpitations and leg swelling.   Gastrointestinal: Negative for abdominal pain, nausea and vomiting.        WAS HAVING HEARTBURN, BUT BACK ON MEDS AND NO FURTHER EPISODES.   Endocrine: Negative for cold intolerance and heat intolerance.   Genitourinary: Negative for difficulty urinating, frequency and urgency.   Musculoskeletal: Positive for arthralgias (JOINTS). Negative for back pain and neck pain.   Skin: Negative for rash and wound.   Allergic/Immunologic: Positive for food allergies (TURKEY). Negative for environmental allergies.   Neurological: Negative for dizziness, syncope, weakness and light-headedness.   Hematological: Does not bruise/bleed easily.   Psychiatric/Behavioral: Negative for agitation, confusion and sleep disturbance (DENIES WAKING UP SMOTHERING/SOA). The patient is not nervous/anxious.        Objective   /68 (BP Location: Left arm, Patient Position: Sitting)   Pulse 74   Ht 165.1 cm (65\")   Wt 130 kg (286 lb 12.8 oz)   LMP  (LMP Unknown)   SpO2 96%   BMI 47.73 kg/m²   Vitals:    03/04/20 0855   BP: 161/68   BP Location: Left arm   Patient Position: Sitting   Pulse: 74   SpO2: 96%   Weight: 130 kg (286 lb 12.8 oz)   Height: 165.1 cm (65\")      Lab Results (most recent)     None        Physical Exam   Constitutional: She is oriented to person, place, and time. Vital signs are normal. She appears well-developed and well-nourished. She is active and cooperative.   HENT:   Head: Normocephalic.   Eyes: Lids are normal.   Neck: Normal carotid pulses, no hepatojugular reflux and no JVD present. Carotid bruit is not present.   "   Cardiovascular: Normal rate, regular rhythm and normal heart sounds.   Pulses:       Radial pulses are 2+ on the right side, and 2+ on the left side.        Dorsalis pedis pulses are 2+ on the right side, and 2+ on the left side.        Posterior tibial pulses are 2+ on the right side, and 2+ on the left side.   NO EDEMA BLE.   Pulmonary/Chest: Effort normal and breath sounds normal.   Abdominal: Normal appearance and bowel sounds are normal.   Neurological: She is alert and oriented to person, place, and time.   Skin: Skin is warm, dry and intact.   Psychiatric: She has a normal mood and affect. Her speech is normal and behavior is normal. Judgment and thought content normal. Cognition and memory are normal.       Procedure   Procedures         Assessment/Plan      Diagnosis Plan   1. Essential hypertension  irbesartan (AVAPRO) 300 MG tablet   2. Palpitations     3. Body mass index (BMI) 40.0-44.9, adult (CMS/HCC)     4. Paroxysmal atrial fibrillation (CMS/HCC)  dilTIAZem CD (CARTIA XT) 240 MG 24 hr capsule   5. Peripheral edema  dilTIAZem CD (CARTIA XT) 240 MG 24 hr capsule       Return in about 5 months (around 8/4/2020).  Hypertension-patient's doing very well per her report on diltiazem and irbesartan.  Palpitations/A. fib-patient is on diltiazem, Tikosyn and Pradaxa and doing well.  Peripheral edema-patient has not had to use her Lasix very often at all.  She says she is done very well.  She will continue her medication regimen.  She will follow-up in 5 months or sooner if any changes.         Patient's Body mass index is 47.73 kg/m². BMI is above normal parameters. Recommendations include: educational material and referral to primary care.      Electronically signed by:         None known

## 2022-07-02 DIAGNOSIS — R60.9 PERIPHERAL EDEMA: ICD-10-CM

## 2022-07-02 DIAGNOSIS — I48.0 PAROXYSMAL ATRIAL FIBRILLATION: ICD-10-CM

## 2022-07-05 RX ORDER — DILTIAZEM HYDROCHLORIDE 240 MG/1
CAPSULE, COATED, EXTENDED RELEASE ORAL
Qty: 90 CAPSULE | Refills: 1 | Status: SHIPPED | OUTPATIENT
Start: 2022-07-05 | End: 2022-12-29

## 2022-10-06 ENCOUNTER — OFFICE VISIT (OUTPATIENT)
Dept: CARDIOLOGY | Facility: CLINIC | Age: 62
End: 2022-10-06

## 2022-10-06 VITALS
SYSTOLIC BLOOD PRESSURE: 164 MMHG | HEART RATE: 73 BPM | TEMPERATURE: 97.3 F | HEIGHT: 66 IN | BODY MASS INDEX: 43.55 KG/M2 | DIASTOLIC BLOOD PRESSURE: 74 MMHG | OXYGEN SATURATION: 96 % | WEIGHT: 271 LBS

## 2022-10-06 DIAGNOSIS — I10 PRIMARY HYPERTENSION: Primary | ICD-10-CM

## 2022-10-06 DIAGNOSIS — I51.89 GRADE II DIASTOLIC DYSFUNCTION: ICD-10-CM

## 2022-10-06 DIAGNOSIS — R60.9 PERIPHERAL EDEMA: ICD-10-CM

## 2022-10-06 DIAGNOSIS — E78.2 MIXED HYPERLIPIDEMIA: ICD-10-CM

## 2022-10-06 DIAGNOSIS — I48.19 PERSISTENT ATRIAL FIBRILLATION: ICD-10-CM

## 2022-10-06 PROCEDURE — 93000 ELECTROCARDIOGRAM COMPLETE: CPT | Performed by: NURSE PRACTITIONER

## 2022-10-06 PROCEDURE — 99214 OFFICE O/P EST MOD 30 MIN: CPT | Performed by: NURSE PRACTITIONER

## 2022-10-06 RX ORDER — CLONIDINE HYDROCHLORIDE 0.1 MG/1
0.1 TABLET ORAL 3 TIMES DAILY PRN
Qty: 45 TABLET | Refills: 5 | Status: SHIPPED | OUTPATIENT
Start: 2022-10-06

## 2022-10-06 NOTE — PROGRESS NOTES
Subjective   Renetta Horner is a 61 y.o. female     Chief Complaint   Patient presents with   • Follow-up     Hypertension        HPI    Problem List:    1. Atrial Fibrillation   1.1 CHADS score 2; patient Pradaxa and Tikosyn  1.2 Event Monitor 4/23-5/3/16 - Afib and Afib with RVR  1.3 Ablation with Dr. Rey 1/18/17  1.4 Cardioversion with Dr. Rey 4/28/17  2. Hypertension  2.1 Echo 4/8/15 - EF > 65%; DD II; trace MR  2.2 Echo 3/3/2021-Global LV systolic function is preserved, some degree of LVH, diastolic dysfunction 1  3. Chest Pain   3.1 stress test 3/3/2021-degraded images, however compatible with moderate size lateral wall infarct, EF 62%, markedly elevated transischemic dilation ratio probably fictitious  3.2 left heart cath 4/5/2021-normal coronary arteries, EF 65%, LVEDP 18-20  4. Diabetes Mellitus II  5. CKD I Dr. Kelly   6.  Covid vaccine, Maderna, first dose 3/1/2021 and second dose will be 3/29/2021    Patient is a 61-year-old female who presents today for a follow-up.  She denies any chest pain, pressure, palpitations, fluttering, dizziness, presyncope, syncope, orthopnea PND.  She says she does have some swelling but she uses her water pill twice a week and that helps.  Patient says that she has been having headaches but still has her .  She has been under a lot of stress because her sister fell and hit her head and ended up having an injury.  Her blood pressure is elevated today but she is got a monitor at home.    We went over her labs.  Her last LDL was 124.2 which was within normal range, triglycerides 154 and A1c was 7.2.    Current Outpatient Medications on File Prior to Visit   Medication Sig Dispense Refill   • dilTIAZem CD (CARDIZEM CD) 240 MG 24 hr capsule Take 1 capsule by mouth once daily 90 capsule 1   • dofetilide (TIKOSYN) 500 MCG capsule Take 1 capsule by mouth Every 12 (Twelve) Hours. 180 capsule 3   • furosemide (LASIX) 20 MG tablet Take 1 tablet by mouth Daily As  Needed (edema). 90 tablet 3   • irbesartan (AVAPRO) 300 MG tablet Take 1 tablet by mouth every night at bedtime. 90 tablet 3   • loratadine (CLARITIN) 10 MG tablet Take 10 mg by mouth Daily.     • metFORMIN (GLUCOPHAGE) 1000 MG tablet Take 1,000 mg by mouth 2 (Two) Times a Day.     • naproxen (Naprosyn) 500 MG tablet Take 1 tablet by mouth Daily As Needed for Mild Pain . 30 tablet 0   • nitroglycerin (NITROSTAT) 0.4 MG SL tablet Place 1 tablet under the tongue Every 5 (Five) Minutes As Needed for Chest Pain. Take no more than 3 doses in 15 minutes. 30 tablet 2   • pantoprazole (PROTONIX) 40 MG EC tablet Take 40 mg by mouth Daily.     • potassium chloride 10 MEQ CR tablet Take 1 tablet by mouth Daily As Needed (with lasix). 90 tablet 3   • Pradaxa 150 MG capsu Take 1 capsule by mouth twice daily 180 capsule 3   • SITagliptin (Januvia) 100 MG tablet Take 100 mg by mouth Daily.     • vitamin D (ERGOCALCIFEROL) 80708 units capsule capsule Take 50,000 Units by mouth 1 (One) Time Per Week.     • [DISCONTINUED] doxycycline (VIBRAMYCIN) 100 MG capsule Take 100 mg by mouth 2 (Two) Times a Day.       No current facility-administered medications on file prior to visit.       ALLERGIES    Atenolol, Eliquis [apixaban], Indomethacin, Lisinopril, Penicillins, Aspirin, Codeine, Other, Sulfa antibiotics, Ultram [tramadol], and Xarelto [rivaroxaban]    Past Medical History:   Diagnosis Date   • Arthritis    • Atrial fibrillation (HCC)     CHADS-VASC = 3   • Atrial fibrillation (HCC) 05/09/2016    1. Persistent Atrial Fibrillation s/p ECV X2 with IRAF and failed Flecainide     A. Diagnosed in 2015. CHADS-VASc = 3 (DM, HTN, female)      B. Xarelto & ASA stopped due to hemarthrosis of knee.        C. Restarted on Pradaxa and no major issues 150 mg BID     D. Echo 4/8/15 showed EF 65%, moderate, Grade II diastolic dysfunction, mild SERA (LA 3.9cm), normal valves     E. Myocardial Perfusion study 4/8/15: negative for ischemia, EF 68%     •  Chest pain    • COVID-19 vaccine administered 2021    2nd - 2021 - Moderna 10/23/2021 ,2022 ,2022 - Moderna   • Diabetes mellitus (HCC)     on oral agents, Type 2 , Patient states last a1c was 5.9 --DX'D 5 YEARS AGO, CHECKS BLOOD SUGAR DAILY AVERAGE 115-122   • Dizziness    • Fatigue    • GERD (gastroesophageal reflux disease)    • Headache    • Heart murmur    • Hypertension     CONTROLLED WITH MEDS PER PT    • Kidney stone    • Palpitations    • PONV (postoperative nausea and vomiting)    • Wears dentures     UPPER PLATE    • Wears dentures     upper   • Wears eyeglasses     reading       Social History     Socioeconomic History   • Marital status:    Tobacco Use   • Smoking status: Former Smoker     Packs/day: 0.25     Types: Cigarettes     Quit date:      Years since quittin.   • Smokeless tobacco: Never Used   Vaping Use   • Vaping Use: Never used   Substance and Sexual Activity   • Alcohol use: No   • Drug use: No   • Sexual activity: Defer       Family History   Problem Relation Age of Onset   • Coronary artery disease Mother    • Breast cancer Mother    • Diabetes Mother    • Other Mother         Benign prostatic hypertrophy       Review of Systems   Constitutional: Negative for appetite change, chills, diaphoresis, fatigue and fever.   HENT: Negative for congestion, rhinorrhea and sore throat.    Eyes: Positive for visual disturbance (reading glasses ).   Respiratory: Negative for cough, chest tightness, shortness of breath and wheezing.    Cardiovascular: Positive for leg swelling (legs, feet and ankles swelling goes down at night when they swell; taking lasix 2 x a week and has done well ). Negative for chest pain and palpitations.   Gastrointestinal: Negative for abdominal pain, blood in stool, constipation, diarrhea, nausea and vomiting.   Endocrine: Positive for heat intolerance (Night sweats and Hot flahses ). Negative for cold intolerance.   Genitourinary:  "Negative for difficulty urinating, dysuria, frequency, hematuria and urgency.   Musculoskeletal: Positive for arthralgias (lower back ) and back pain (lower back pain ). Negative for joint swelling, neck pain and neck stiffness.   Skin: Negative for color change, pallor, rash and wound.   Allergic/Immunologic: Positive for environmental allergies (seasonal ) and food allergies (turkey ).   Neurological: Positive for headaches (been having headaches lately). Negative for dizziness, syncope, weakness, light-headedness and numbness.   Hematological: Does not bruise/bleed easily.   Psychiatric/Behavioral: Negative for sleep disturbance.       Objective   /74 (BP Location: Left arm, Patient Position: Sitting)   Pulse 73   Temp 97.3 °F (36.3 °C)   Ht 167.6 cm (66\")   Wt 123 kg (271 lb)   LMP  (LMP Unknown)   SpO2 96%   BMI 43.74 kg/m²   Vitals:    10/06/22 0903 10/06/22 0919   BP: 177/77 164/74   BP Location: Left arm Left arm   Patient Position: Sitting Sitting   Pulse: 73    Temp: 97.3 °F (36.3 °C)    SpO2: 96%    Weight: 123 kg (271 lb)    Height: 167.6 cm (66\")       Lab Results (most recent)     None        Physical Exam  Vitals reviewed.   Constitutional:       General: She is awake.      Appearance: Normal appearance. She is well-developed and well-groomed. She is morbidly obese.   HENT:      Head: Normocephalic.   Eyes:      General: Lids are normal.   Neck:      Vascular: No carotid bruit, hepatojugular reflux or JVD.   Cardiovascular:      Rate and Rhythm: Normal rate and regular rhythm.      Pulses:           Radial pulses are 2+ on the right side and 2+ on the left side.        Dorsalis pedis pulses are 2+ on the right side and 2+ on the left side.        Posterior tibial pulses are 2+ on the right side and 2+ on the left side.      Heart sounds: Normal heart sounds.   Pulmonary:      Effort: Pulmonary effort is normal.      Breath sounds: Normal breath sounds and air entry.   Abdominal:      " General: Bowel sounds are normal.      Palpations: Abdomen is soft.   Musculoskeletal:      Right lower leg: No edema.      Left lower leg: No edema.   Skin:     General: Skin is warm and dry.   Neurological:      Mental Status: She is alert and oriented to person, place, and time.   Psychiatric:         Attention and Perception: Attention and perception normal.         Mood and Affect: Mood and affect normal.         Speech: Speech normal.         Behavior: Behavior normal. Behavior is cooperative.         Thought Content: Thought content normal.         Cognition and Memory: Cognition and memory normal.         Judgment: Judgment normal.         Procedure     ECG 12 Lead    Date/Time: 10/6/2022 9:25 AM  Performed by: Katelynn Smith APRN  Authorized by: Katelynn Smith APRN   Comparison: compared with previous ECG from 1/21/2022  Comparison to previous ECG: Low voltage today   Rhythm: sinus rhythm  Rate: normal  BPM: 62  QRS axis: normal  Other findings: low voltage    Clinical impression: non-specific ECG  Comments: QT/QTc 431/436                 Assessment & Plan      Diagnosis Plan   1. Primary hypertension  cloNIDine (Catapres) 0.1 MG tablet   2. Mixed hyperlipidemia     3. Persistent atrial fibrillation (HCC)     4. Grade II diastolic dysfunction     5. Peripheral edema         Return in about 3 months (around 1/6/2023).    Hypertension-patient is currently on diltiazem and irbesartan.  She will monitor blood pressure and use clonidine 0.1 3 times daily as needed for SBP greater than 160 or DBP greater than 90.  She will call back with blood pressure readings if her blood pressure staying elevated on a regular basis.  Hyperlipidemia-patient is diet controlled.  A. fib-patient's on diltiazem, Tikosyn Pradaxa.  Diastolic dysfunction/peripheral edema-patient is Lasix as needed.  She will continue her medication regimen with above-noted change.  She will follow-up in 3 months or sooner if any changes.        Renetta Horner  reports that she quit smoking about 25 years ago. Her smoking use included cigarettes. She smoked 0.25 packs per day. She has never used smokeless tobacco..Patient brought in medicine list to appointment, it's been reviewed with patient and med list was updated in the chart.     Electronically signed by:

## 2022-10-27 DIAGNOSIS — I48.19 PERSISTENT ATRIAL FIBRILLATION: ICD-10-CM

## 2022-10-27 RX ORDER — DOFETILIDE 0.5 MG/1
500 CAPSULE ORAL EVERY 12 HOURS
Qty: 180 CAPSULE | Refills: 3 | Status: SHIPPED | OUTPATIENT
Start: 2022-10-27

## 2022-10-27 RX ORDER — DOFETILIDE 0.5 MG/1
CAPSULE ORAL
Qty: 180 CAPSULE | Refills: 0 | Status: SHIPPED | OUTPATIENT
Start: 2022-10-27 | End: 2022-10-27 | Stop reason: SDUPTHER

## 2022-11-23 ENCOUNTER — TELEPHONE (OUTPATIENT)
Dept: CARDIOLOGY | Facility: CLINIC | Age: 62
End: 2022-11-23

## 2022-11-23 NOTE — TELEPHONE ENCOUNTER
For the HUB to read to pt:       Patient was scheduled to see Katelynn Smith on 01/26/2023 however Katelynn will be out of office that week. I have rescheduled her to 03/16/2023. I could not reach her via phone, I did mail a letter informing her of this appointment change.

## 2022-12-28 DIAGNOSIS — I48.0 PAROXYSMAL ATRIAL FIBRILLATION: ICD-10-CM

## 2022-12-28 DIAGNOSIS — R60.9 PERIPHERAL EDEMA: ICD-10-CM

## 2022-12-29 RX ORDER — DILTIAZEM HYDROCHLORIDE 240 MG/1
CAPSULE, COATED, EXTENDED RELEASE ORAL
Qty: 90 CAPSULE | Refills: 0 | Status: SHIPPED | OUTPATIENT
Start: 2022-12-29 | End: 2023-03-22

## 2023-01-04 DIAGNOSIS — I10 ESSENTIAL HYPERTENSION: ICD-10-CM

## 2023-01-04 RX ORDER — IRBESARTAN 300 MG/1
TABLET ORAL
Qty: 90 TABLET | Refills: 3 | Status: SHIPPED | OUTPATIENT
Start: 2023-01-04

## 2023-01-26 DIAGNOSIS — I48.0 PAROXYSMAL ATRIAL FIBRILLATION: ICD-10-CM

## 2023-01-26 RX ORDER — ATENOLOL 100 MG/1
TABLET ORAL
Qty: 180 CAPSULE | Refills: 0 | Status: SHIPPED | OUTPATIENT
Start: 2023-01-26

## 2023-01-26 NOTE — TELEPHONE ENCOUNTER
Name from pharmacy: Pradaxa 150 MG Oral Capsule          Will file in chart as: Pradaxa 150 MG capsu    Sig: Take 1 capsule by mouth twice daily    Disp:  180 capsule    Refills:  0    Start: 1/26/2023    Class: Normal    Non-formulary For: Paroxysmal atrial fibrillation (HCC)    Last ordered: 12 months ago by SUSANA Hayes Last refill: 10/22/2022    Rx #: [id number]    Thrombin inhibitor Protocol Failed 01/26/2023 11:57 AM   Protocol Details  Most recent eGFR is above 30 in the past 12 months.    CBC on record in past 12 months    No active pregnancy on record    No positive pregnancy test in past 12 months    Recent or future appt with prescriber (12MO/30D)

## 2023-02-15 PROBLEM — R07.89 CHEST TIGHTNESS: Status: RESOLVED | Noted: 2021-03-01 | Resolved: 2023-02-15

## 2023-02-15 PROBLEM — R94.39 ABNORMAL STRESS TEST: Status: RESOLVED | Noted: 2021-03-08 | Resolved: 2023-02-15

## 2023-02-17 ENCOUNTER — OFFICE VISIT (OUTPATIENT)
Dept: CARDIOLOGY | Facility: CLINIC | Age: 63
End: 2023-02-17
Payer: COMMERCIAL

## 2023-02-17 VITALS
DIASTOLIC BLOOD PRESSURE: 78 MMHG | WEIGHT: 239 LBS | BODY MASS INDEX: 38.41 KG/M2 | SYSTOLIC BLOOD PRESSURE: 124 MMHG | HEIGHT: 66 IN | HEART RATE: 62 BPM | OXYGEN SATURATION: 98 %

## 2023-02-17 DIAGNOSIS — I48.19 PERSISTENT ATRIAL FIBRILLATION: Primary | ICD-10-CM

## 2023-02-17 PROCEDURE — 99214 OFFICE O/P EST MOD 30 MIN: CPT | Performed by: STUDENT IN AN ORGANIZED HEALTH CARE EDUCATION/TRAINING PROGRAM

## 2023-02-17 PROCEDURE — 93000 ELECTROCARDIOGRAM COMPLETE: CPT | Performed by: STUDENT IN AN ORGANIZED HEALTH CARE EDUCATION/TRAINING PROGRAM

## 2023-02-17 RX ORDER — TIRZEPATIDE 2.5 MG/.5ML
INJECTION, SOLUTION SUBCUTANEOUS WEEKLY
COMMUNITY

## 2023-02-17 NOTE — PROGRESS NOTES
Cardiac Electrophysiology Outpatient Note  [facility]    Office Visit     Renetta Horner  [id number]  01/21/2022    Primary Care Physician: Monet Love MD    Referred By: No ref. provider found    Subjective     Chief Complaint   Patient presents with   • Atrial Fibrillation       History of Present Illness:   Renetta Horner is a 62 y.o. female who presents to my electrophysiology clinic for follow up of this in atrial fibrillation.  She underwent ablation with Dr. Rey in 2017 consisting of pulmonary vein isolation as well as a box lesion for the posterior wall.  She had recurrences afterward and was started on Tikosyn.  Since then she has done quite well.  She was last seen about 1 year ago and continued since then to do well.  She has not had any symptomatic recurrence of atrial fibrillation.  She checks her blood pressure at home and it is well controlled there.  She has not had problems with the Pradaxa.  She is lost about 40 pounds since her last visit.  She is on Mounjaro now and this is helping a lot.  Past Medical History:   Diagnosis Date   • Arthritis    • Atrial fibrillation (HCC)     CHADS-VASC = 3   • Atrial fibrillation (HCC) 05/09/2016    1. Persistent Atrial Fibrillation s/p ECV X2 with IRAF and failed Flecainide     A. Diagnosed in 2015. CHADS-VASc = 3 (DM, HTN, female)      B. Xarelto & ASA stopped due to hemarthrosis of knee.        C. Restarted on Pradaxa and no major issues 150 mg BID     D. Echo 4/8/15 showed EF 65%, moderate, Grade II diastolic dysfunction, mild SERA (LA 3.9cm), normal valves     E. Myocardial Perfusion study 4/8/15: negative for ischemia, EF 68%     • Chest pain    • COVID-19 vaccine administered 03/01/2021    2nd - 03/30/2021 - Moderna 10/23/2021 ,05/13/2022 ,09/16/2022 - Moderna   • Diabetes mellitus (HCC)     on oral agents, Type 2 , Patient states last a1c was 5.9 --DX'D 5 YEARS AGO, CHECKS BLOOD  SUGAR DAILY AVERAGE 115-122   • Dizziness    • Fatigue    • GERD (gastroesophageal reflux disease)    • Headache    • Heart murmur    • Hypertension     CONTROLLED WITH MEDS PER PT    • Kidney stone    • Palpitations    • PONV (postoperative nausea and vomiting)    • Wears dentures     UPPER PLATE    • Wears dentures     upper   • Wears eyeglasses     reading       Past Surgical History:   Procedure Laterality Date   • ANKLE SURGERY      right    • CARDIAC ELECTROPHYSIOLOGY PROCEDURE N/A 2017    Procedure: Schedule for a PVA. Cancel the Tikon admission.;  Surgeon: Geronimo Rey DO;  Location: [facility] INVASIVE LOC[facility];  Service:    • CHOLECYSTECTOMY     • COLONOSCOPY     • HYSTERECTOMY     • JOINT REPLACEMENT      right knee,    • KNEE SURGERY Right     KNEE REPLACEMENT        Family History   Problem Relation Age of Onset   • Coronary artery disease Mother    • Breast cancer Mother    • Diabetes Mother    • Other Mother         Benign prostatic hypertrophy       Social History     Socioeconomic History   • Marital status:    Tobacco Use   • Smoking status: Former     Packs/day: 0.25     Years: 3.00     Pack years: 0.75     Types: Cigarettes     Start date:      Quit date:      Years since quittin.1   • Smokeless tobacco: Never   Vaping Use   • Vaping Use: Never used   Substance and Sexual Activity   • Alcohol use: No   • Drug use: No   • Sexual activity: Defer         Current Outpatient Medications:   •  cloNIDine (Catapres) 0.1 MG tablet, Take 1 tablet by mouth 3 (Three) Times a Day As Needed for High Blood Pressure (SBP> 160 or DBP > 90)., Disp: 45 tablet, Rfl: 5  •  dilTIAZem CD (CARDIZEM CD) 240 MG 24 hr capsule, Take 1 capsule by mouth once daily, Disp: 90 capsule, Rfl: 0  •  dofetilide (TIKOSYN) 500 MCG capsule, Take 1 capsule by mouth Every 12 (Twelve) Hours., Disp: 180 capsule, Rfl: 3  •  furosemide (LASIX) 20 MG tablet, Take 1 tablet by mouth Daily As Needed (edema).  (Patient taking differently: Take 20 mg by mouth 2 (Two) Times a Week. 2 times weekly prn), Disp: 90 tablet, Rfl: 3  •  irbesartan (AVAPRO) 300 MG tablet, TAKE 1 TABLET BY MOUTH EVERY DAY AT BEDTIME, Disp: 90 tablet, Rfl: 3  •  loratadine (CLARITIN) 10 MG tablet, Take 10 mg by mouth Daily., Disp: , Rfl:   •  metFORMIN (GLUCOPHAGE) 1000 MG tablet, Take 1,000 mg by mouth 2 (Two) Times a Day., Disp: , Rfl:   •  naproxen (Naprosyn) 500 MG tablet, Take 1 tablet by mouth Daily As Needed for Mild Pain ., Disp: 30 tablet, Rfl: 0  •  nitroglycerin (NITROSTAT) 0.4 MG SL tablet, Place 1 tablet under the tongue Every 5 (Five) Minutes As Needed for Chest Pain. Take no more than 3 doses in 15 minutes., Disp: 30 tablet, Rfl: 2  •  pantoprazole (PROTONIX) 40 MG EC tablet, Take 40 mg by mouth Daily., Disp: , Rfl:   •  potassium chloride 10 MEQ CR tablet, Take 1 tablet by mouth Daily As Needed (with lasix)., Disp: 90 tablet, Rfl: 3  •  Pradaxa 150 MG capsu, Take 1 capsule by mouth twice daily, Disp: 180 capsule, Rfl: 0  •  SITagliptin (JANUVIA) 100 MG tablet, Take 100 mg by mouth Daily., Disp: , Rfl:   •  Tirzepatide (Mounjaro) 2.5 MG/0.5ML solution pen-injector, Inject  under the skin into the appropriate area as directed 1 (One) Time Per Week., Disp: , Rfl:   •  vitamin D (ERGOCALCIFEROL) 76885 units capsule capsule, Take 50,000 Units by mouth 1 (One) Time Per Week., Disp: , Rfl:     Allergies:   Allergies   Allergen Reactions   • Atenolol Shortness Of Breath     Hard to breath   • Eliquis [Apixaban] GI Bleeding   • Indomethacin Hives and Shortness Of Breath     , HIVES    • Lisinopril Shortness Of Breath   • Penicillins Anaphylaxis   • Aspirin GI Intolerance   • Codeine Itching and Nausea Only     ITCHING    • Other Itching     [place]-- hives and vomiting   • Sulfa Antibiotics Nausea And Vomiting   • Ultram [Tramadol] Nausea And Vomiting   • Xarelto [Rivaroxaban] Other (See Comments)     Bleeding knee       Objective   Vital  "Signs: Blood pressure 124/78, pulse 62, height 167.6 cm (66\"), weight 108 kg (239 lb), SpO2 98 %.    PHYSICAL EXAM  General appearance: Awake, alert, cooperative, obese  Head: Normocephalic, without obvious abnormality, atraumatic  Neck: No JVD  Lungs: Clear to ascultation bilaterally  Heart: Regular rate and rhythm, no murmurs, 2+ LE pulses, no lower extremity swelling  Skin: Skin color, turgor normal, no rashes or lesions  Neurologic: Grossly normal     Lab Results   Component Value Date    GLUCOSE 119 (H) 07/20/2021    CALCIUM 9.6 07/20/2021     07/20/2021    K 4.7 07/20/2021    CO2 25.5 07/20/2021     07/20/2021    BUN 12 07/20/2021    CREATININE 0.56 (L) 07/20/2021    EGFRIFNONA 110 07/20/2021    BCR 21.4 07/20/2021    ANIONGAP 12.5 07/20/2021     Lab Results   Component Value Date    WBC 8.86 03/15/2021    HGB 12.8 03/15/2021    HCT 40.4 03/15/2021    MCV 90.0 03/15/2021     03/15/2021     Lab Results   Component Value Date    INR 1.12 01/17/2017    PROTIME 12.2 (H) 01/17/2017     Lab Results   Component Value Date    TSH 2.190 07/20/2021          Results for orders placed during the hospital encounter of 03/03/21    Adult Transthoracic Echo Complete W/ Cont if Necessary Per Protocol    Interpretation Summary  Technically limited study.    1.  Global LV systolic function is grossly preserved.  Formal regional wall motion assessment cannot be performed.  There is some degree of left ventricular hypertrophy present with grade 1A diastolic dysfunction.  Mild left atrial enlargement.  Right heart chambers are normal.    2.  Valves are grossly morphologically and physiologically normal.    3.  No pericardial or great vessel pathology.    4.  Pulmonary artery pressures cannot be calculated.         I personally viewed and interpreted the patient's EKG/Telemetry/lab data      ECG 12 Lead    Date/Time: 2/17/2023 10:34 AM  Performed by: Prosper Rausch MD  Authorized by: Prosper Rausch MD "   Comparison: compared with previous ECG from 10/6/2022  Similar to previous ECG  Comments: Sinus rhythm, incomplete right bundle branch block, QTc measured 458 ms            Renetta Horner  reports that she quit smoking about 26 years ago. Her smoking use included cigarettes. She started smoking about 29 years ago. She has a 0.75 pack-year smoking history. She has never used smokeless tobacco..       Advance Care Planning   Advance Care Planning: ACP discussion was held with the patient during this visit. Patient does not have an advance directive, declines further assistance.     Assessment & Plan    1. Persistent atrial fibrillation (HCC)  She has a history of persistent atrial fibrillation status post ablation with Dr. Rey in 2017 consisting of pulmonary vein isolation and posterior box isolation.  She had recurrence after this is been maintained on Tikosyn.  She has done well without recent recurrences.  Her QTC today was measured at 458 ms.  We will plan to continue Tikosyn at the current dose.  We will plan to get an EKG every 6 months for monitoring of this.  We will continue on Pradaxa    2. Primary hypertension  Blood pressure is well controlled at home.  We will continue her current dose of Cardizem and irbesartan.  She has not needed her as needed clonidine    3. Morbid obesity with BMI of 40.0-44.9, adult (HCC)  She has lost 40 pounds at her last visit which I congratulated her on.  We discussed this would likely help her atrial fibrillation management going forward.  She will continue to work on losing more weight..       Follow Up:  Return in about 1 year (around 2/17/2024).      Thank you for allowing me to participate in the care of your patient. Please do not hesitate to contact me with additional questions or concerns.      Prosper Rausch M.D.  Cardiac Electrophysiologist  [facility] / [facility]

## 2023-03-22 DIAGNOSIS — R60.9 PERIPHERAL EDEMA: ICD-10-CM

## 2023-03-22 DIAGNOSIS — I48.0 PAROXYSMAL ATRIAL FIBRILLATION: ICD-10-CM

## 2023-03-22 RX ORDER — DILTIAZEM HYDROCHLORIDE 240 MG/1
CAPSULE, COATED, EXTENDED RELEASE ORAL
Qty: 90 CAPSULE | Refills: 0 | Status: SHIPPED | OUTPATIENT
Start: 2023-03-22

## 2023-04-26 DIAGNOSIS — I48.0 PAROXYSMAL ATRIAL FIBRILLATION: ICD-10-CM

## 2023-04-26 RX ORDER — ATENOLOL 100 MG/1
150 TABLET ORAL EVERY 12 HOURS SCHEDULED
Qty: 180 CAPSULE | Refills: 0 | Status: SHIPPED | OUTPATIENT
Start: 2023-04-26

## 2023-08-29 ENCOUNTER — TELEPHONE (OUTPATIENT)
Dept: CARDIOLOGY | Facility: CLINIC | Age: 63
End: 2023-08-29
Payer: COMMERCIAL

## 2023-08-29 NOTE — LETTER
September 1, 2023       To Whom It May Concern:    We build our practice on integrity and patient care. The highest compliment we can receive is the referral of friends, family and patients to our office. We want to take this time to thank you for considering our group as part of your care team.    The medical records for Renetta Horner 1960 were reviewed for pre-operative clearance on 09/01/2023. It has been determined that this patient has:  ACCEPTABLE RISK.     Renetta Horner is currently on the following medications that will need to be held prior to surgery: PRADAXA 2 TO 3 DAYS PRIOR TO SURGERY AND IS TO TAKE ASPIRIN 81MG  IN ABSENCE OF PRADAXA.    This pre-operative evaluation has been completed based on patient reported medical conditions at the date of this letter, this evaluation may have considered in part results of additional testing, completion of an EKG and patient reported symptoms at the time of their visit.    Renetta Horner's pre-operative clearance is good for thirty(30) days from the date of this letter with no reported changes in health, symptoms or diagnosis from the patient, their primary care provider or your office.    Your office has reported the patient will under go OP EXCISION on DATE TO BE DETERMINED with Dr. MEDINA KUHN. If this appointment is changed or moved outside of thirty(30) days from 09- this pre-operative clearance is void.    If you have any further questions please give our office a call.    Thank you for your trust,      LOPEZ Farmer

## 2023-08-29 NOTE — TELEPHONE ENCOUNTER
Received cardiac clearance request from  stating pt has op excision to be scheduled and is requiring a cardiac clearance. Placed cardiac clearance request in Stephanie's inbox to review and address with provider.

## 2023-09-01 NOTE — TELEPHONE ENCOUNTER
Cardiac clearance right faxed for OP excision per dr. Jamshid Mccain. Notified patient to take aspirin in absence of pradaxa. Verbalizes understanding.

## 2023-09-08 ENCOUNTER — OFFICE VISIT (OUTPATIENT)
Dept: CARDIOLOGY | Facility: CLINIC | Age: 63
End: 2023-09-08
Payer: COMMERCIAL

## 2023-09-08 VITALS
OXYGEN SATURATION: 95 % | DIASTOLIC BLOOD PRESSURE: 58 MMHG | WEIGHT: 209.8 LBS | SYSTOLIC BLOOD PRESSURE: 135 MMHG | BODY MASS INDEX: 33.72 KG/M2 | HEIGHT: 66 IN | HEART RATE: 54 BPM

## 2023-09-08 DIAGNOSIS — R60.9 PERIPHERAL EDEMA: ICD-10-CM

## 2023-09-08 DIAGNOSIS — I48.0 PAROXYSMAL ATRIAL FIBRILLATION: Primary | ICD-10-CM

## 2023-09-08 DIAGNOSIS — I10 PRIMARY HYPERTENSION: ICD-10-CM

## 2023-09-08 DIAGNOSIS — E78.2 MIXED HYPERLIPIDEMIA: ICD-10-CM

## 2023-09-08 PROCEDURE — 3075F SYST BP GE 130 - 139MM HG: CPT | Performed by: PHYSICIAN ASSISTANT

## 2023-09-08 PROCEDURE — 1160F RVW MEDS BY RX/DR IN RCRD: CPT | Performed by: PHYSICIAN ASSISTANT

## 2023-09-08 PROCEDURE — 1159F MED LIST DOCD IN RCRD: CPT | Performed by: PHYSICIAN ASSISTANT

## 2023-09-08 PROCEDURE — 99213 OFFICE O/P EST LOW 20 MIN: CPT | Performed by: PHYSICIAN ASSISTANT

## 2023-09-08 PROCEDURE — 3078F DIAST BP <80 MM HG: CPT | Performed by: PHYSICIAN ASSISTANT

## 2023-09-08 PROCEDURE — 93000 ELECTROCARDIOGRAM COMPLETE: CPT | Performed by: PHYSICIAN ASSISTANT

## 2023-09-08 RX ORDER — TIRZEPATIDE 7.5 MG/.5ML
INJECTION, SOLUTION SUBCUTANEOUS WEEKLY
COMMUNITY
Start: 2023-08-09

## 2023-09-08 NOTE — PROGRESS NOTES
Problem list     Subjective   Renetta Horner is a 62 y.o. female     Chief Complaint   Patient presents with    Follow-up     Here for routine f/u    Atrial Fibrillation    Heart Murmur    Hyperlipidemia    Hypertension     Problem List:     1. Atrial Fibrillation   1.1 Ablation with Dr. Rey 1/18/17  2. Hypertension  3. Chest Pain   3.1 stress test 3/3/2021-degraded images, however compatible with moderate size lateral wall infarct, EF 62%, markedly elevated transischemic dilation ratio probably fictitious  3.2 left heart cath 4/5/2021-normal coronary arteries, EF 65%, LVEDP 18-20  4. Diabetes Mellitus II  5. CKD I Dr. Leticia MEJIA  The patient presents in the clinic today for routine evaluation and follow-up.  For the most part, she has done well since last evaluation.  She denies symptoms of A-fib with Tikosyn.  She is tolerating current anticoagulation without complication.  She denies angina or failure issues otherwise.  Blood pressures well controlled on current medical regimen.  She has no further complaints otherwise and feels that she is doing well.        Current Outpatient Medications on File Prior to Visit   Medication Sig Dispense Refill    cloNIDine (Catapres) 0.1 MG tablet Take 1 tablet by mouth 3 (Three) Times a Day As Needed for High Blood Pressure (SBP> 160 or DBP > 90). 45 tablet 5    dilTIAZem CD (CARDIZEM CD) 240 MG 24 hr capsule Take 1 capsule by mouth once daily 90 capsule 0    dofetilide (TIKOSYN) 500 MCG capsule Take 1 capsule by mouth Every 12 (Twelve) Hours. 180 capsule 3    furosemide (LASIX) 20 MG tablet Take 1 tablet by mouth Daily As Needed (edema). (Patient taking differently: Take 1 tablet by mouth. 2 times weekly prn) 90 tablet 3    irbesartan (AVAPRO) 300 MG tablet TAKE 1 TABLET BY MOUTH EVERY DAY AT BEDTIME 90 tablet 3    loratadine (CLARITIN) 10 MG tablet Take 1 tablet by mouth Daily.      Mounjaro 7.5 MG/0.5ML solution pen-injector 1 (One) Time Per Week.      naproxen  (Naprosyn) 500 MG tablet Take 1 tablet by mouth Daily As Needed for Mild Pain . 30 tablet 0    pantoprazole (PROTONIX) 40 MG EC tablet Take 1 tablet by mouth Daily.      potassium chloride 10 MEQ CR tablet Take 1 tablet by mouth Daily As Needed (with lasix). (Patient taking differently: Take 1 tablet by mouth Daily As Needed (with lasix). Takes with lasix schedule. Twice weekly) 90 tablet 3    Pradaxa 150 MG capsu Take 1 capsule by mouth twice daily 180 capsule 0    vitamin D (ERGOCALCIFEROL) 18697 units capsule capsule Take 1 capsule by mouth 1 (One) Time Per Week.      nitroglycerin (NITROSTAT) 0.4 MG SL tablet Place 1 tablet under the tongue Every 5 (Five) Minutes As Needed for Chest Pain. Take no more than 3 doses in 15 minutes. (Patient not taking: Reported on 9/8/2023) 30 tablet 2     No current facility-administered medications on file prior to visit.       Atenolol, Eliquis [apixaban], Indomethacin, Lisinopril, Penicillins, Aspirin, Codeine, Other, Sulfa antibiotics, Ultram [tramadol], and Xarelto [rivaroxaban]    Past Medical History:   Diagnosis Date    Arthritis     Atrial fibrillation     CHADS-VASC = 3    Atrial fibrillation 05/09/2016    1. Persistent Atrial Fibrillation s/p ECV X2 with IRAF and failed Flecainide     A. Diagnosed in 2015. CHADS-VASc = 3 (DM, HTN, female)      B. Xarelto & ASA stopped due to hemarthrosis of knee.        C. Restarted on Pradaxa and no major issues 150 mg BID     D. Echo 4/8/15 showed EF 65%, moderate, Grade II diastolic dysfunction, mild SERA (LA 3.9cm), normal valves     E. Myocardial Perfusion study 4/8/15: negative for ischemia, EF 68%      Chest pain     COVID-19 vaccine administered 03/01/2021    2nd - 03/30/2021 - Moderna 10/23/2021 ,05/13/2022 ,09/16/2022 - Moderna    Diabetes mellitus     on oral agents, Type 2 , Patient states last a1c was 5.9 --DX'D 5 YEARS AGO, CHECKS BLOOD SUGAR DAILY AVERAGE 115-122    Dizziness     Fatigue     GERD (gastroesophageal reflux  "disease)     Headache     Heart murmur     Hypertension     CONTROLLED WITH MEDS PER PT     Kidney stone     Palpitations     PONV (postoperative nausea and vomiting)     Wears dentures     UPPER PLATE     Wears dentures     upper    Wears eyeglasses     reading       Social History     Socioeconomic History    Marital status:    Tobacco Use    Smoking status: Former     Packs/day: 0.25     Years: 3.00     Pack years: 0.75     Types: Cigarettes     Start date:      Quit date:      Years since quittin.7    Smokeless tobacco: Never   Vaping Use    Vaping Use: Never used   Substance and Sexual Activity    Alcohol use: No    Drug use: No    Sexual activity: Defer       Family History   Problem Relation Age of Onset    Coronary artery disease Mother     Breast cancer Mother     Diabetes Mother     Other Mother         Benign prostatic hypertrophy       Review of Systems   Constitutional: Negative.    HENT: Negative.     Eyes:  Positive for visual disturbance (glasses prn).   Respiratory: Negative.     Cardiovascular: Negative.    Gastrointestinal: Negative.    Endocrine: Negative.    Genitourinary: Negative.    Musculoskeletal:  Positive for arthralgias and myalgias.   Skin: Negative.    Allergic/Immunologic: Positive for environmental allergies.   Neurological: Negative.    Hematological: Negative.    Psychiatric/Behavioral: Negative.       Objective   Vitals:    23 1136   BP: 135/58   BP Location: Left arm   Patient Position: Sitting   Pulse: 54   SpO2: 95%   Weight: 95.2 kg (209 lb 12.8 oz)   Height: 167.6 cm (65.98\")      /58 (BP Location: Left arm, Patient Position: Sitting)   Pulse 54   Ht 167.6 cm (65.98\")   Wt 95.2 kg (209 lb 12.8 oz)   LMP  (LMP Unknown)   SpO2 95%   BMI 33.88 kg/m²    Lab Results (most recent)       None          Physical Exam  Vitals and nursing note reviewed.   Constitutional:       General: She is not in acute distress.     Appearance: She is " well-developed.   HENT:      Head: Normocephalic and atraumatic.   Eyes:      Conjunctiva/sclera: Conjunctivae normal.      Pupils: Pupils are equal, round, and reactive to light.   Neck:      Vascular: No JVD.      Trachea: No tracheal deviation.   Cardiovascular:      Rate and Rhythm: Normal rate and regular rhythm.      Heart sounds: Normal heart sounds.   Pulmonary:      Effort: Pulmonary effort is normal.      Breath sounds: Normal breath sounds.   Abdominal:      General: Bowel sounds are normal. There is no distension.      Palpations: Abdomen is soft. There is no mass.      Tenderness: There is no abdominal tenderness. There is no guarding or rebound.   Musculoskeletal:         General: No tenderness or deformity. Normal range of motion.      Cervical back: Normal range of motion and neck supple.   Skin:     General: Skin is warm and dry.      Coloration: Skin is not pale.      Findings: No erythema or rash.   Neurological:      Mental Status: She is alert and oriented to person, place, and time.   Psychiatric:         Behavior: Behavior normal.         Thought Content: Thought content normal.         Judgment: Judgment normal.         Procedure     ECG 12 Lead    Date/Time: 9/8/2023 11:45 AM  Performed by: Ancelmo Estevez PA  Authorized by: Ancelmo Estevez PA            Assessment & Plan      Diagnosis Plan   1. Paroxysmal atrial fibrillation  ECG 12 Lead      2. Primary hypertension  ECG 12 Lead      3. Mixed hyperlipidemia        4. Peripheral edema        1.  At this time, the patient appears to be doing well.  A-fib is still well managed with Tikosyn and medical regimen otherwise as above.  We will continue medications without change.    2.  Lipid and blood pressure parameters appear well managed with current medical regimen.  We will continue to follow along.    3.  As the patient is doing well, nothing further.  She will contact us immediately for any issues.  Nothing further and we will see her  on 6-month intervals.             Renetta Horner  reports that she quit smoking about 26 years ago. Her smoking use included cigarettes. She started smoking about 29 years ago. She has a 0.75 pack-year smoking history. She has never used smokeless tobacco.. I have educated her on the risk of diseases from using tobacco products such as cancer, COPD, and heart disease.               BMI is >= 30 and <35. (Class 1 Obesity). The following options were offered after discussion;: pcp addressing             Electronically signed by:

## 2023-09-26 DIAGNOSIS — R60.9 PERIPHERAL EDEMA: ICD-10-CM

## 2023-09-26 DIAGNOSIS — I48.0 PAROXYSMAL ATRIAL FIBRILLATION: ICD-10-CM

## 2023-09-26 RX ORDER — DILTIAZEM HYDROCHLORIDE 240 MG/1
240 CAPSULE, COATED, EXTENDED RELEASE ORAL DAILY
Qty: 90 CAPSULE | Refills: 0 | Status: SHIPPED | OUTPATIENT
Start: 2023-09-26

## 2023-10-25 DIAGNOSIS — I48.19 PERSISTENT ATRIAL FIBRILLATION: ICD-10-CM

## 2023-10-25 DIAGNOSIS — I48.0 PAROXYSMAL ATRIAL FIBRILLATION: ICD-10-CM

## 2023-10-25 RX ORDER — ATENOLOL 100 MG/1
150 TABLET ORAL EVERY 12 HOURS SCHEDULED
Qty: 180 CAPSULE | Refills: 0 | Status: SHIPPED | OUTPATIENT
Start: 2023-10-25

## 2023-10-25 RX ORDER — DOFETILIDE 0.5 MG/1
500 CAPSULE ORAL EVERY 12 HOURS
Qty: 180 CAPSULE | Refills: 0 | Status: SHIPPED | OUTPATIENT
Start: 2023-10-25

## 2023-12-12 DIAGNOSIS — I48.0 PAROXYSMAL ATRIAL FIBRILLATION: ICD-10-CM

## 2023-12-12 DIAGNOSIS — R60.9 PERIPHERAL EDEMA: ICD-10-CM

## 2023-12-13 RX ORDER — DILTIAZEM HYDROCHLORIDE 240 MG/1
240 CAPSULE, COATED, EXTENDED RELEASE ORAL DAILY
Qty: 90 CAPSULE | Refills: 0 | Status: SHIPPED | OUTPATIENT
Start: 2023-12-13

## 2023-12-14 DIAGNOSIS — I10 ESSENTIAL HYPERTENSION: ICD-10-CM

## 2023-12-14 RX ORDER — IRBESARTAN 300 MG/1
300 TABLET ORAL
Qty: 90 TABLET | Refills: 3 | Status: SHIPPED | OUTPATIENT
Start: 2023-12-14

## 2024-01-22 DIAGNOSIS — I48.19 PERSISTENT ATRIAL FIBRILLATION: ICD-10-CM

## 2024-01-22 DIAGNOSIS — I48.0 PAROXYSMAL ATRIAL FIBRILLATION: ICD-10-CM

## 2024-01-22 RX ORDER — ATENOLOL 100 MG/1
150 TABLET ORAL EVERY 12 HOURS SCHEDULED
Qty: 180 CAPSULE | Refills: 0 | Status: SHIPPED | OUTPATIENT
Start: 2024-01-22

## 2024-01-22 RX ORDER — DOFETILIDE 0.5 MG/1
500 CAPSULE ORAL EVERY 12 HOURS
Qty: 180 CAPSULE | Refills: 1 | Status: CANCELLED | OUTPATIENT
Start: 2024-01-22

## 2024-01-22 NOTE — TELEPHONE ENCOUNTER
Name from pharmacy: Pradaxa 150 MG Oral Capsule          Will file in chart as: Pradaxa 150 MG capsu    Sig: Take 1 capsule by mouth twice daily    Disp: 180 capsule    Refills: 0    SERENITY    Start: 1/22/2024    Class: Normal    For: Paroxysmal atrial fibrillation    Last ordered: 2 months ago (10/25/2023) by LOPEZ Delaney    Last refill: 10/25/2023    Rx #: [id number]    Thrombin inhibitor Protocol Nvngig2001/22/2024 04:42 PM   Protocol Details Most recent eGFR is above 30 in the past 12 months.    CBC on record in past 12 months    No active pregnancy on record    No positive pregnancy test in past 12 months    Recent or future appt with prescriber (12MO/30D)

## 2024-01-23 DIAGNOSIS — I48.19 PERSISTENT ATRIAL FIBRILLATION: ICD-10-CM

## 2024-01-23 RX ORDER — DOFETILIDE 0.5 MG/1
500 CAPSULE ORAL EVERY 12 HOURS
Qty: 180 CAPSULE | Refills: 0 | Status: SHIPPED | OUTPATIENT
Start: 2024-01-23

## 2024-01-23 NOTE — TELEPHONE ENCOUNTER
Patient returned call to see when Tikosyn was going to be sent to the pharmacy. Explained Tikosyn is not typically prescribed or refilled by our office. Patient was referred to EP for treatment.  She will call EP for refills.

## 2024-03-15 ENCOUNTER — OFFICE VISIT (OUTPATIENT)
Dept: CARDIOLOGY | Facility: CLINIC | Age: 64
End: 2024-03-15
Payer: MEDICARE

## 2024-03-15 VITALS
HEIGHT: 66 IN | BODY MASS INDEX: 32.47 KG/M2 | HEART RATE: 76 BPM | SYSTOLIC BLOOD PRESSURE: 142 MMHG | WEIGHT: 202 LBS | OXYGEN SATURATION: 97 % | DIASTOLIC BLOOD PRESSURE: 82 MMHG

## 2024-03-15 DIAGNOSIS — I48.19 PERSISTENT ATRIAL FIBRILLATION: ICD-10-CM

## 2024-03-15 DIAGNOSIS — R07.89 OTHER CHEST PAIN: ICD-10-CM

## 2024-03-15 NOTE — PROGRESS NOTES
Cardiac Electrophysiology Outpatient Note  [facility]    Office Visit     Renetta Horner  [id number]  01/21/2024    Primary Care Physician: Monet Love MD    Referred By: No ref. provider found    Subjective     Chief Complaint   Patient presents with    Persistent atrial fibrillation       History of Present Illness:   Renetta Horner is a 63 y.o. female who presents to my electrophysiology clinic for follow up of this in atrial fibrillation.  She underwent ablation with Dr. Rey in 2017 consisting of pulmonary vein isolation as well as a box lesion for the posterior wall.  She had recurrences afterward and was started on Tikosyn.  Since then she has done quite well.  Since her last visit she is overall doing well.  She continues on Mounjaro and is lost approximately 80 pounds.  She occasionally feels palpitations when drinking too much caffeine.  These will often last for 10 to 15 minutes.  Also has a sensation of vertigo when she lays down at night.  Blood pressure is actually been lower at times and she is now taking her irbesartan only when she notices her blood pressure is higher at home.      Past Medical History:   Diagnosis Date    Arthritis     Atrial fibrillation     CHADS-VASC = 3    Atrial fibrillation 05/09/2016    1. Persistent Atrial Fibrillation s/p ECV X2 with IRAF and failed Flecainide     A. Diagnosed in 2015. CHADS-VASc = 3 (DM, HTN, female)      B. Xarelto & ASA stopped due to hemarthrosis of knee.        C. Restarted on Pradaxa and no major issues 150 mg BID     D. Echo 4/8/15 showed EF 65%, moderate, Grade II diastolic dysfunction, mild SERA (LA 3.9cm), normal valves     E. Myocardial Perfusion study 4/8/15: negative for ischemia, EF 68%      Chest pain     COVID-19 vaccine administered 03/01/2021    2nd - 03/30/2021 - Moderna 10/23/2021 ,05/13/2022 ,09/16/2022 - Moderna    Diabetes mellitus     on oral agents, Type 2 ,  Patient states last a1c was 5.9 --DX'D 5 YEARS AGO, CHECKS BLOOD SUGAR DAILY AVERAGE 115-122    Dizziness     Fatigue     GERD (gastroesophageal reflux disease)     Headache     Heart murmur     Hypertension     CONTROLLED WITH MEDS PER PT     Kidney stone     Palpitations     PONV (postoperative nausea and vomiting)     Wears dentures     UPPER PLATE     Wears dentures     upper    Wears eyeglasses     reading       Past Surgical History:   Procedure Laterality Date    ANKLE SURGERY      right     CARDIAC ELECTROPHYSIOLOGY PROCEDURE N/A 2017    Procedure: Schedule for a PVA. Cancel the kon admission.;  Surgeon: Geronimo Rey DO;  Location: [facility] INVASIVE LOCATION;  Service:     CHOLECYSTECTOMY      COLONOSCOPY      HYSTERECTOMY      JOINT REPLACEMENT      right knee,     KNEE SURGERY Right     KNEE REPLACEMENT        Family History   Problem Relation Age of Onset    Coronary artery disease Mother     Breast cancer Mother     Diabetes Mother     Other Mother         Benign prostatic hypertrophy       Social History     Socioeconomic History    Marital status:    Tobacco Use    Smoking status: Former     Current packs/day: 0.00     Average packs/day: 0.3 packs/day for 3.0 years (0.8 ttl pk-yrs)     Types: Cigarettes     Start date:      Quit date:      Years since quittin.2    Smokeless tobacco: Never   Vaping Use    Vaping status: Never Used   Substance and Sexual Activity    Alcohol use: No    Drug use: No    Sexual activity: Defer         Current Outpatient Medications:     dilTIAZem CD (CARDIZEM CD) 240 MG 24 hr capsule, Take 1 capsule by mouth once daily, Disp: 90 capsule, Rfl: 0    dofetilide (TIKOSYN) 500 MCG capsule, Take 1 capsule by mouth Every 12 (Twelve) Hours., Disp: 180 capsule, Rfl: 0    furosemide (LASIX) 20 MG tablet, Take 1 tablet by mouth Daily As Needed (edema). (Patient taking differently: Take 1 tablet by mouth. 2 times weekly prn), Disp: 90 tablet, Rfl:  "3    irbesartan (AVAPRO) 300 MG tablet, Take 1 tablet by mouth every night at bedtime., Disp: 90 tablet, Rfl: 3    loratadine (CLARITIN) 10 MG tablet, Take 1 tablet by mouth Daily., Disp: , Rfl:     Mounjaro 7.5 MG/0.5ML solution pen-injector, 1 (One) Time Per Week., Disp: , Rfl:     pantoprazole (PROTONIX) 40 MG EC tablet, Take 1 tablet by mouth Daily., Disp: , Rfl:     potassium chloride 10 MEQ CR tablet, Take 1 tablet by mouth Daily As Needed (with lasix). (Patient taking differently: Take 1 tablet by mouth Daily As Needed (with lasix). Takes with lasix schedule. Twice weekly), Disp: 90 tablet, Rfl: 3    Pradaxa 150 MG capsu, Take 1 capsule by mouth twice daily, Disp: 180 capsule, Rfl: 0    vitamin D (ERGOCALCIFEROL) 09074 units capsule capsule, Take 1 capsule by mouth 1 (One) Time Per Week., Disp: , Rfl:     cloNIDine (Catapres) 0.1 MG tablet, Take 1 tablet by mouth 3 (Three) Times a Day As Needed for High Blood Pressure (SBP> 160 or DBP > 90). (Patient not taking: Reported on 3/15/2024), Disp: 45 tablet, Rfl: 5    naproxen (Naprosyn) 500 MG tablet, Take 1 tablet by mouth Daily As Needed for Mild Pain . (Patient not taking: Reported on 3/15/2024), Disp: 30 tablet, Rfl: 0    Allergies:   Allergies   Allergen Reactions    Atenolol Shortness Of Breath     Hard to breath    Eliquis [Apixaban] GI Bleeding    Indomethacin Hives and Shortness Of Breath     , HIVES     Lisinopril Shortness Of Breath    Penicillins Anaphylaxis    Aspirin GI Intolerance    Codeine Itching and Nausea Only     ITCHING     Other Itching     [place]-- hives and vomiting    Sulfa Antibiotics Nausea And Vomiting    Ultram [Tramadol] Nausea And Vomiting    Xarelto [Rivaroxaban] Other (See Comments)     Bleeding knee       Objective   Vital Signs: Blood pressure 142/82, pulse 76, height 167.6 cm (66\"), weight 91.6 kg (202 lb), SpO2 97%.    PHYSICAL EXAM  General appearance: Awake, alert, cooperative, obese  Head: Normocephalic, without obvious " abnormality, atraumatic  Neck: No JVD  Lungs: Clear to ascultation bilaterally  Heart: Regular rate and rhythm, no murmurs, 2+ LE pulses, no lower extremity swelling  Skin: Skin color, turgor normal, no rashes or lesions  Neurologic: Grossly normal     Lab Results   Component Value Date    GLUCOSE 119 (H) 07/20/2021    CALCIUM 9.6 07/20/2021     07/20/2021    K 4.7 07/20/2021    CO2 25.5 07/20/2021     07/20/2021    BUN 12 07/20/2021    CREATININE 0.56 (L) 07/20/2021    EGFRIFNONA 110 07/20/2021    BCR 21.4 07/20/2021    ANIONGAP 12.5 07/20/2021     Lab Results   Component Value Date    WBC 8.86 03/15/2021    HGB 12.8 03/15/2021    HCT 40.4 03/15/2021    MCV 90.0 03/15/2021     03/15/2021     Lab Results   Component Value Date    INR 1.12 01/17/2017    PROTIME 12.2 (H) 01/17/2017     Lab Results   Component Value Date    TSH 2.190 07/20/2021          Results for orders placed during the hospital encounter of 03/03/21    Adult Transthoracic Echo Complete W/ Cont if Necessary Per Protocol    Interpretation Summary  Technically limited study.    1.  Global LV systolic function is grossly preserved.  Formal regional wall motion assessment cannot be performed.  There is some degree of left ventricular hypertrophy present with grade 1A diastolic dysfunction.  Mild left atrial enlargement.  Right heart chambers are normal.    2.  Valves are grossly morphologically and physiologically normal.    3.  No pericardial or great vessel pathology.    4.  Pulmonary artery pressures cannot be calculated.         I personally viewed and interpreted the patient's EKG/Telemetry/lab data    Procedures    Renetta Horner  reports that she quit smoking about 27 years ago. Her smoking use included cigarettes. She started smoking about 30 years ago. She has a 0.8 pack-year smoking history. She has never used smokeless tobacco..   EKG reviewed, close sinus rhythm, first-degree AV block, QTc around 440 ms    Advance  Care Planning   Advance Care Planning: ACP discussion was held with the patient during this visit. Patient does not have an advance directive, declines further assistance.     Assessment & Plan    1. Persistent atrial fibrillation (HCC)  She has a history of persistent atrial fibrillation status post ablation with Dr. Rey in 2017 consisting of pulmonary vein isolation and posterior box isolation.  She had recurrence after this is been maintained on Tikosyn.  She has done well without recent recurrences.  Her QTC today was measured at 440 ms.  We will plan to continue Tikosyn at the current dose.  We will plan to get an EKG every 6 months for monitoring of this.  We will continue on Pradaxa    2. Primary hypertension  Blood pressure is well controlled at home.  Blood pressure is active and lower after her weight loss.  She is checking it at home and takes irbesartan when it is high.  I think it is reasonable to continue this for now.     3. Morbid obesity with BMI of 40.0-44.9, adult (HCC)  She has lost 80 pounds at her last visit which I congratulated her on.  We discussed this would likely help her atrial fibrillation management going forward.       Follow Up:  Return in about 1 year (around 3/15/2025) for Recheck.      Thank you for allowing me to participate in the care of your patient. Please do not hesitate to contact me with additional questions or concerns.      Prosper Rausch M.D.  Cardiac Electrophysiologist  [facility] / [facility]

## 2024-03-20 DIAGNOSIS — R60.9 PERIPHERAL EDEMA: ICD-10-CM

## 2024-03-20 DIAGNOSIS — I48.0 PAROXYSMAL ATRIAL FIBRILLATION: ICD-10-CM

## 2024-03-20 RX ORDER — DILTIAZEM HYDROCHLORIDE 240 MG/1
240 CAPSULE, COATED, EXTENDED RELEASE ORAL DAILY
Qty: 90 CAPSULE | Refills: 0 | Status: SHIPPED | OUTPATIENT
Start: 2024-03-20

## 2024-04-23 DIAGNOSIS — I48.19 PERSISTENT ATRIAL FIBRILLATION: ICD-10-CM

## 2024-04-23 RX ORDER — DOFETILIDE 0.5 MG/1
500 CAPSULE ORAL EVERY 12 HOURS
Qty: 180 CAPSULE | Refills: 3 | Status: SHIPPED | OUTPATIENT
Start: 2024-04-23

## 2024-05-06 DIAGNOSIS — I48.0 PAROXYSMAL ATRIAL FIBRILLATION: ICD-10-CM

## 2024-05-06 RX ORDER — ATENOLOL 100 MG/1
150 TABLET ORAL EVERY 12 HOURS SCHEDULED
Qty: 180 CAPSULE | Refills: 0 | Status: SHIPPED | OUTPATIENT
Start: 2024-05-06

## 2024-05-20 ENCOUNTER — OFFICE VISIT (OUTPATIENT)
Dept: CARDIOLOGY | Facility: CLINIC | Age: 64
End: 2024-05-20
Payer: MEDICARE

## 2024-05-20 VITALS
DIASTOLIC BLOOD PRESSURE: 68 MMHG | SYSTOLIC BLOOD PRESSURE: 146 MMHG | WEIGHT: 205 LBS | OXYGEN SATURATION: 98 % | HEART RATE: 54 BPM | HEIGHT: 66 IN | BODY MASS INDEX: 32.95 KG/M2

## 2024-05-20 DIAGNOSIS — I10 PRIMARY HYPERTENSION: ICD-10-CM

## 2024-05-20 DIAGNOSIS — I48.0 PAROXYSMAL ATRIAL FIBRILLATION: Primary | ICD-10-CM

## 2024-05-20 DIAGNOSIS — R06.09 DYSPNEA ON EXERTION: ICD-10-CM

## 2024-05-20 PROCEDURE — 3077F SYST BP >= 140 MM HG: CPT | Performed by: PHYSICIAN ASSISTANT

## 2024-05-20 PROCEDURE — 3078F DIAST BP <80 MM HG: CPT | Performed by: PHYSICIAN ASSISTANT

## 2024-05-20 PROCEDURE — 1160F RVW MEDS BY RX/DR IN RCRD: CPT | Performed by: PHYSICIAN ASSISTANT

## 2024-05-20 PROCEDURE — 93000 ELECTROCARDIOGRAM COMPLETE: CPT | Performed by: PHYSICIAN ASSISTANT

## 2024-05-20 PROCEDURE — 1159F MED LIST DOCD IN RCRD: CPT | Performed by: PHYSICIAN ASSISTANT

## 2024-05-20 PROCEDURE — 99213 OFFICE O/P EST LOW 20 MIN: CPT | Performed by: PHYSICIAN ASSISTANT

## 2024-05-20 NOTE — PROGRESS NOTES
Advance Care Planning   ACP discussion was held with the patient during this visit. Patient does not have an advance directive, declines further assistance.   Problem list     Subjective   Renetta Horner is a 63 y.o. female     Chief Complaint   Patient presents with    Follow-up     7 month follow up    Problem list:  1.  Paroxysmal atrial fibrillation.  1.1.  Ablation with Dr. Trejo, 2017.  1.2.  Recurrent symptoms and issues of A-fib eventually prompting institution of Tikosyn at that time.  1.3.  Pradaxa utilize for CVA risk reduction.  2.  Minor nonobstructive CAD per catheterization, 2021.  3.  Preserved systolic function.  4.  Hypertension  5.  Dyslipidemia  6.  Diabetes mellitus.    HPI  The patient presents to clinic today for routine evaluation and follow-up.  Previous cardiovascular history is as outlined above.  She did see Dr. Rausch recently in service.  She was felt stable.  She was continued on Tikosyn and Pradaxa without change.  We wanted to see her back today for routine follow-up.  She currently denies chest pain.  She has stable dyspnea.  She has no failure symptoms.  She has had no symptoms of A-fib of significance since last evaluation.  She really has done well on Tikosyn therapy.  She has no further complaints at this time.    Current Outpatient Medications on File Prior to Visit   Medication Sig Dispense Refill    dilTIAZem CD (CARDIZEM CD) 240 MG 24 hr capsule Take 1 capsule by mouth once daily 90 capsule 0    dofetilide (TIKOSYN) 500 MCG capsule TAKE 1 CAPSULE BY MOUTH EVERY 12 HOURS 180 capsule 3    furosemide (LASIX) 20 MG tablet Take 1 tablet by mouth Daily As Needed (edema). (Patient taking differently: Take 1 tablet by mouth. 2 times weekly prn) 90 tablet 3    irbesartan (AVAPRO) 300 MG tablet Take 1 tablet by mouth every night at bedtime. 90 tablet 3    loratadine (CLARITIN) 10 MG tablet Take 1 tablet by mouth Daily.      Mounjaro 7.5 MG/0.5ML solution pen-injector 1 (One)  Time Per Week.      pantoprazole (PROTONIX) 40 MG EC tablet Take 1 tablet by mouth Daily.      potassium chloride 10 MEQ CR tablet Take 1 tablet by mouth Daily As Needed (with lasix). (Patient taking differently: Take 1 tablet by mouth Daily As Needed (with lasix). Takes with lasix schedule. Twice weekly) 90 tablet 3    Pradaxa 150 MG capsu Take 1 capsule by mouth twice daily 180 capsule 0    vitamin D (ERGOCALCIFEROL) 55504 units capsule capsule Take 1 capsule by mouth 1 (One) Time Per Week.      [DISCONTINUED] cloNIDine (Catapres) 0.1 MG tablet Take 1 tablet by mouth 3 (Three) Times a Day As Needed for High Blood Pressure (SBP> 160 or DBP > 90). (Patient not taking: Reported on 3/15/2024) 45 tablet 5    [DISCONTINUED] naproxen (Naprosyn) 500 MG tablet Take 1 tablet by mouth Daily As Needed for Mild Pain . (Patient not taking: Reported on 3/15/2024) 30 tablet 0     No current facility-administered medications on file prior to visit.       Atenolol, Eliquis [apixaban], Indomethacin, Lisinopril, Penicillins, Aspirin, Codeine, Other, Sulfa antibiotics, Ultram [tramadol], and Xarelto [rivaroxaban]    Past Medical History:   Diagnosis Date    Arthritis     Atrial fibrillation     CHADS-VASC = 3    Atrial fibrillation 05/09/2016    1. Persistent Atrial Fibrillation s/p ECV X2 with IRAF and failed Flecainide     A. Diagnosed in 2015. CHADS-VASc = 3 (DM, HTN, female)      B. Xarelto & ASA stopped due to hemarthrosis of knee.        C. Restarted on Pradaxa and no major issues 150 mg BID     D. Echo 4/8/15 showed EF 65%, moderate, Grade II diastolic dysfunction, mild SERA (LA 3.9cm), normal valves     E. Myocardial Perfusion study 4/8/15: negative for ischemia, EF 68%      Chest pain     COVID-19 vaccine administered 03/01/2021    2nd - 03/30/2021 - Moderna 10/23/2021 ,05/13/2022 ,09/16/2022 - Moderna    Diabetes mellitus     on oral agents, Type 2 , Patient states last a1c was 5.9 --DX'D 5 YEARS AGO, CHECKS BLOOD SUGAR DAILY  AVERAGE 115-122    Dizziness     Fatigue     GERD (gastroesophageal reflux disease)     Headache     Heart murmur     Hypertension     CONTROLLED WITH MEDS PER PT     Kidney stone     Palpitations     PONV (postoperative nausea and vomiting)     Wears dentures     UPPER PLATE     Wears dentures     upper    Wears eyeglasses     reading       Social History     Socioeconomic History    Marital status:    Tobacco Use    Smoking status: Former     Current packs/day: 0.00     Average packs/day: 0.3 packs/day for 3.0 years (0.8 ttl pk-yrs)     Types: Cigarettes     Start date:      Quit date:      Years since quittin.4    Smokeless tobacco: Never   Vaping Use    Vaping status: Never Used   Substance and Sexual Activity    Alcohol use: No    Drug use: No    Sexual activity: Defer       Family History   Problem Relation Age of Onset    Coronary artery disease Mother     Breast cancer Mother     Diabetes Mother     Other Mother         Benign prostatic hypertrophy       Review of Systems   Constitutional: Negative.  Negative for chills, diaphoresis, fatigue and fever.   HENT: Negative.     Eyes: Negative.  Negative for visual disturbance.   Respiratory: Negative.  Negative for apnea, cough, chest tightness, shortness of breath and wheezing.    Cardiovascular: Negative.  Negative for chest pain, palpitations and leg swelling.   Gastrointestinal: Negative.  Negative for abdominal pain and blood in stool.   Endocrine: Negative.    Genitourinary: Negative.  Negative for hematuria.   Musculoskeletal:  Positive for arthralgias. Negative for back pain, myalgias, neck pain and neck stiffness.   Skin: Negative.  Negative for rash and wound.   Allergic/Immunologic: Positive for food allergies (turkey). Negative for environmental allergies.   Neurological: Negative.  Negative for dizziness, syncope, weakness, light-headedness, numbness and headaches.   Hematological:  Bruises/bleeds easily (bruises easily is on  "pradaxa).   Psychiatric/Behavioral: Negative.  Negative for sleep disturbance.        Objective   Vitals:    05/20/24 0932 05/20/24 0954   BP: 153/75 146/68   Pulse: 54 54   SpO2: 98%    Weight: 93 kg (205 lb)    Height: 167.6 cm (66\")       /68   Pulse 54   Ht 167.6 cm (66\")   Wt 93 kg (205 lb)   LMP  (LMP Unknown)   SpO2 98%   BMI 33.09 kg/m²    Lab Results (most recent)       None          Physical Exam  Vitals and nursing note reviewed.   Constitutional:       General: She is not in acute distress.     Appearance: She is well-developed.   HENT:      Head: Normocephalic and atraumatic.   Eyes:      Conjunctiva/sclera: Conjunctivae normal.      Pupils: Pupils are equal, round, and reactive to light.   Neck:      Vascular: No JVD.      Trachea: No tracheal deviation.   Cardiovascular:      Rate and Rhythm: Normal rate and regular rhythm.      Heart sounds: Normal heart sounds.   Pulmonary:      Effort: Pulmonary effort is normal.      Breath sounds: Normal breath sounds.   Abdominal:      General: Bowel sounds are normal. There is no distension.      Palpations: Abdomen is soft. There is no mass.      Tenderness: There is no abdominal tenderness. There is no guarding or rebound.   Musculoskeletal:         General: No tenderness or deformity. Normal range of motion.      Cervical back: Normal range of motion and neck supple.   Skin:     General: Skin is warm and dry.      Coloration: Skin is not pale.      Findings: No erythema or rash.   Neurological:      Mental Status: She is alert and oriented to person, place, and time.   Psychiatric:         Behavior: Behavior normal.         Thought Content: Thought content normal.         Judgment: Judgment normal.           Procedure     ECG 12 Lead    Date/Time: 5/20/2024 9:50 AM  Performed by: Ancelmo Estevez PA    Authorized by: Ancelmo Estevez PA  Comparison: compared with previous ECG from 3/15/2024  Comparison to previous ECG: Sinus rhythm, rate 51, " borderline first-degree AV block, right axis, borderline QT prolongation, no acute changes noted.             Assessment & Plan      Diagnosis Plan   1. Paroxysmal atrial fibrillation        2. Dyspnea on exertion        3. Primary hypertension          Patient appears to be doing well from general cardiovascular standpoint.  A-fib is well-controlled and maintained with Tikosyn therapy.  She tolerates Pradaxa without complication.    2.  Dyspnea remains at baseline.  She is largely asymptomatic otherwise.  I do not feel further evaluation is indicated.    3.  As the patient is doing well and previous workup has been benign otherwise, nothing further.  We will continue to see her on 6 to 12-month intervals.                         Electronically signed by:

## 2024-06-13 DIAGNOSIS — R60.0 PERIPHERAL EDEMA: ICD-10-CM

## 2024-06-13 DIAGNOSIS — I48.0 PAROXYSMAL ATRIAL FIBRILLATION: ICD-10-CM

## 2024-06-13 RX ORDER — DILTIAZEM HYDROCHLORIDE 240 MG/1
240 CAPSULE, COATED, EXTENDED RELEASE ORAL DAILY
Qty: 90 CAPSULE | Refills: 0 | Status: SHIPPED | OUTPATIENT
Start: 2024-06-13

## 2024-08-05 DIAGNOSIS — I48.0 PAROXYSMAL ATRIAL FIBRILLATION: ICD-10-CM

## 2024-08-05 RX ORDER — ATENOLOL 100 MG/1
150 TABLET ORAL EVERY 12 HOURS SCHEDULED
Qty: 180 CAPSULE | Refills: 0 | Status: SHIPPED | OUTPATIENT
Start: 2024-08-05

## 2024-09-12 DIAGNOSIS — I48.0 PAROXYSMAL ATRIAL FIBRILLATION: ICD-10-CM

## 2024-09-12 DIAGNOSIS — R60.0 PERIPHERAL EDEMA: ICD-10-CM

## 2024-09-12 RX ORDER — DILTIAZEM HYDROCHLORIDE 240 MG/1
240 CAPSULE, EXTENDED RELEASE ORAL DAILY
Qty: 90 CAPSULE | Refills: 0 | Status: SHIPPED | OUTPATIENT
Start: 2024-09-12

## 2024-11-05 DIAGNOSIS — I48.0 PAROXYSMAL ATRIAL FIBRILLATION: ICD-10-CM

## 2024-11-05 RX ORDER — ATENOLOL 100 MG/1
150 TABLET ORAL EVERY 12 HOURS SCHEDULED
Qty: 180 CAPSULE | Refills: 0 | Status: SHIPPED | OUTPATIENT
Start: 2024-11-05

## 2024-12-03 DIAGNOSIS — I48.0 PAROXYSMAL ATRIAL FIBRILLATION: ICD-10-CM

## 2024-12-03 DIAGNOSIS — R60.0 PERIPHERAL EDEMA: ICD-10-CM

## 2024-12-03 RX ORDER — DILTIAZEM HYDROCHLORIDE 240 MG/1
240 CAPSULE, COATED, EXTENDED RELEASE ORAL DAILY
Qty: 90 CAPSULE | Refills: 0 | Status: SHIPPED | OUTPATIENT
Start: 2024-12-03

## 2025-02-04 DIAGNOSIS — I48.0 PAROXYSMAL ATRIAL FIBRILLATION: ICD-10-CM

## 2025-02-04 RX ORDER — ATENOLOL 100 MG/1
150 TABLET ORAL EVERY 12 HOURS SCHEDULED
Qty: 180 CAPSULE | Refills: 0 | Status: SHIPPED | OUTPATIENT
Start: 2025-02-04

## 2025-03-07 DIAGNOSIS — R60.0 PERIPHERAL EDEMA: ICD-10-CM

## 2025-03-07 DIAGNOSIS — I48.0 PAROXYSMAL ATRIAL FIBRILLATION: ICD-10-CM

## 2025-03-07 RX ORDER — DILTIAZEM HYDROCHLORIDE 240 MG/1
240 CAPSULE, EXTENDED RELEASE ORAL DAILY
Qty: 90 CAPSULE | Refills: 0 | Status: SHIPPED | OUTPATIENT
Start: 2025-03-07

## 2025-03-21 ENCOUNTER — OFFICE VISIT (OUTPATIENT)
Dept: CARDIOLOGY | Facility: CLINIC | Age: 65
End: 2025-03-21
Payer: MEDICARE

## 2025-03-21 VITALS
DIASTOLIC BLOOD PRESSURE: 92 MMHG | SYSTOLIC BLOOD PRESSURE: 150 MMHG | HEIGHT: 66 IN | OXYGEN SATURATION: 99 % | WEIGHT: 204 LBS | HEART RATE: 70 BPM | BODY MASS INDEX: 32.78 KG/M2

## 2025-03-21 DIAGNOSIS — I48.19 PERSISTENT ATRIAL FIBRILLATION: Primary | Chronic | ICD-10-CM

## 2025-03-21 DIAGNOSIS — I10 PRIMARY HYPERTENSION: Chronic | ICD-10-CM

## 2025-03-21 DIAGNOSIS — E66.01 OBESITY, CLASS III, BMI 40-49.9 (MORBID OBESITY): Chronic | ICD-10-CM

## 2025-03-21 NOTE — PROGRESS NOTES
Renetta Horner  [id number]  1960  [phone]      03/21/2025      [facility] CARDIOLOGY     Referring Provider: No ref. provider found     Monet Love MD  [address]  [facility] [address]    Chief Complaint   Patient presents with    Paroxysmal atrial fibrillation       Problem List  Patient Active Problem List   Diagnosis    Diabetes mellitus    Gastroesophageal reflux disease    Heart murmur    Hypertension    Obesity, Class III, BMI 40-49.9 (morbid obesity)    Persistent atrial fibrillation    Long term current use of antiarrhythmic medical therapy    Grade II diastolic dysfunction    Peripheral edema    Mixed hyperlipidemia    Family history of early CAD           History of Present Illness   Renetta Horner is a 64 y.o. female who presents to my electrophysiology clinic for follow up of atrial fibrillation.  Patient underwent pulmonary vein isolation and posterior wall isolation ablation with Dr. Trejo in 2017.  Patient had a few recurrence of atrial fibrillation after ablation and was started on Tikosyn.  Since starting on Tikosyn patient has done well from a cardiac standpoint.  She denies palpitations, shortness of breath, lightheadedness, dizziness and syncope.  She has been on Mounjaro and has lost around 80 pounds.  She does have vertigo when she lays down at night but it resolves quickly.  Blood pressure is elevated in clinic today but patient checks her blood pressure daily at home and log shows its within normal parameters    Outpatient Medications Marked as Taking for the 3/21/25 encounter (Office Visit) with Dandre Herman MD   Medication Sig Dispense Refill    Cartia  MG 24 hr capsule Take 1 capsule by mouth once daily 90 capsule 0    dofetilide (TIKOSYN) 500 MCG capsule TAKE 1 CAPSULE BY MOUTH EVERY 12 HOURS 180 capsule 3    furosemide (LASIX) 20 MG tablet Take 1 tablet by mouth Daily As Needed (edema). (Patient taking differently: Take  "1 tablet by mouth. 2 times weekly prn) 90 tablet 3    irbesartan (AVAPRO) 300 MG tablet Take 1 tablet by mouth every night at bedtime. (Patient taking differently: Take 1 tablet by mouth As Needed.) 90 tablet 3    loratadine (CLARITIN) 10 MG tablet Take 1 tablet by mouth Daily.      Mounjaro 7.5 MG/0.5ML solution pen-injector 1 (One) Time Per Week.      pantoprazole (PROTONIX) 40 MG EC tablet Take 1 tablet by mouth Daily.      potassium chloride 10 MEQ CR tablet Take 1 tablet by mouth Daily As Needed (with lasix). (Patient taking differently: Take 1 tablet by mouth Daily As Needed (with lasix). Takes with lasix schedule. Twice weekly) 90 tablet 3    Pradaxa 150 MG capsu Take 1 capsule by mouth twice daily 180 capsule 0    vitamin D (ERGOCALCIFEROL) 11235 units capsule capsule Take 1 capsule by mouth 1 (One) Time Per Week.              Physical Exam  Vitals:    03/21/25 1052 03/21/25 1103   BP: (!) 206/99 150/92   BP Location: Right arm Left arm   Patient Position: Sitting Sitting   Pulse: 70    SpO2: 99%    Weight: 92.5 kg (204 lb)    Height: 167.6 cm (66\")      Body mass index is 32.93 kg/m².  Constitutional:       Appearance: Healthy appearance.   Neck:      Vascular: JVD normal.   Pulmonary:      Effort: Pulmonary effort is normal.      Breath sounds: Normal breath sounds.   Cardiovascular:      Normal rate. Regular rhythm. Normal S1. Normal S2.       Murmurs: There is no murmur.      No gallop.  No rub.   Pulses:     Intact distal pulses.   Edema:     Peripheral edema absent.   Neurological:      General: No focal deficit present.          Diagnostic Data  Procedures  EKG: Sinus rhythm with sinus arrhythmia with first-degree AV block, 65 bpm, , QRS 94, QT/QTc 428/445      Lab Results   Component Value Date    GLUCOSE 119 (H) 07/20/2021    CALCIUM 9.6 07/20/2021     07/20/2021    K 4.7 07/20/2021    CO2 25.5 07/20/2021     07/20/2021    BUN 12 07/20/2021    CREATININE 0.56 (L) 07/20/2021    " EGFRIFNONA 110 07/20/2021    BCR 21.4 07/20/2021    ANIONGAP 12.5 07/20/2021     Lab Results   Component Value Date    WBC 8.86 03/15/2021    HGB 12.8 03/15/2021    HCT 40.4 03/15/2021    MCV 90.0 03/15/2021     03/15/2021     Lab Results   Component Value Date    INR 1.12 01/17/2017    PROTIME 12.2 (H) 01/17/2017     Lab Results   Component Value Date    TSH 2.190 07/20/2021       I personally viewed and interpreted the patient's EKG/Telemetry/lab data    Renetta Horner  reports that she quit smoking about 28 years ago. Her smoking use included cigarettes. She started smoking about 31 years ago. She has a 0.8 pack-year smoking history. She has never used smokeless tobacco. I have educated her on the risk of diseases from using tobacco products such as cancer, COPD, and heart disease.     ACP discussion was declined by the patient. Patient does not have an advance directive, declines further assistance.    Assessment and Plan  Diagnoses and all orders for this visit:    1. Persistent atrial fibrillation (Primary)    2. Primary hypertension    3. Obesity, Class III, BMI 40-49.9 (morbid obesity)        Persistent atrial fibrillation  -S/p PVI and PWI, 2017 with Dr. Trejo  -Tikosyn initiated after ablation.  -Patient maintaining rhythm on Tikosyn.  QTc stable on EKG today.   -Anticoagulated with Pradaxa and denies any bleeding complications.    Hypertension  -Blood pressure elevated today in clinic.  Recheck blood pressure improved.  Patient checks blood pressure daily at home and log shows that it is within normal limits.  Patient has blood pressure medication she can take as needed if needed.    Obesity  -Patient is on Mounjaro and has lost 80 pounds.  She is also exercising.  -She is currently having her knee looked at and may need surgery in the upcoming months.  Patient is acceptable risk for EP cardiology standpoint for surgery.  Okay to hold blood thinner 2 to 3 days prior to procedure and resume  as soon as appropriate after.      Follow-Up  Return in about 9 months (around 12/21/2025).  Patient will follow-up with Ancelmo Estevez PA-C in July and get a EKG for Tikosyn monitoring and will follow-up with us in 9 months.      Thank you for allowing me to participate in the care of your patient. Please to not hesitate to contact me with additional questions or concerns.

## 2025-04-03 DIAGNOSIS — I48.0 PAROXYSMAL ATRIAL FIBRILLATION: ICD-10-CM

## 2025-04-03 RX ORDER — ATENOLOL 100 MG/1
150 TABLET ORAL EVERY 12 HOURS SCHEDULED
Qty: 180 CAPSULE | Refills: 0 | Status: SHIPPED | OUTPATIENT
Start: 2025-04-03

## 2025-04-14 ENCOUNTER — TELEPHONE (OUTPATIENT)
Dept: CARDIOLOGY | Facility: CLINIC | Age: 65
End: 2025-04-14

## 2025-04-14 ENCOUNTER — TELEPHONE (OUTPATIENT)
Dept: CARDIOLOGY | Facility: CLINIC | Age: 65
End: 2025-04-14
Payer: MEDICARE

## 2025-04-14 DIAGNOSIS — I48.19 PERSISTENT ATRIAL FIBRILLATION: ICD-10-CM

## 2025-04-14 DIAGNOSIS — I48.0 PAROXYSMAL ATRIAL FIBRILLATION: ICD-10-CM

## 2025-04-14 RX ORDER — DOFETILIDE 0.5 MG/1
500 CAPSULE ORAL EVERY 12 HOURS
Qty: 180 CAPSULE | Refills: 3 | Status: SHIPPED | OUTPATIENT
Start: 2025-04-14

## 2025-04-14 NOTE — TELEPHONE ENCOUNTER
Caller: Renetta Horner    Relationship: Self    Best call back number: [phone]    Which medication are you concerned about: PRADAXA    Who prescribed you this medication: ARLIN NIÑO    When did you start taking this medication: YEARS    What are your concerns: PATIENT IS WANTING TO SEE IF THEY CAN TAKE GENERIC AS INSURANCE IS NO LONGER COVERING THE BRAND PLEASE ADVISE.

## 2025-04-14 NOTE — TELEPHONE ENCOUNTER
Caller: Renetta Horner    Relationship: Self    Best call back number: [phone]     What is the best time to reach you:  ANYTIME    Who are you requesting to speak with (clinical staff, provider,  specific staff member):  CLINICAL     What was the call regarding:  PT WENT TO  MEDICATION (PRADAXA), HER INSURANCE WILL NOT COVER THIS. THEY ARE WANTING TO SEE IF SHE CAN TAKE THE GENERIC BRAND.     Is it okay if the provider responds through [employer]:  PLEASE CALL PT, THANK YOU

## 2025-04-14 NOTE — TELEPHONE ENCOUNTER
Caller: Renetta Hornere    Relationship: Self    Best call back number: [phone]     Requested Prescriptions:   Requested Prescriptions     Pending Prescriptions Disp Refills    dofetilide (TIKOSYN) 500 MCG capsule 180 capsule 3     Sig: Take 1 capsule by mouth Every 12 (Twelve) Hours.        Pharmacy where request should be sent: [facility] [address] [place] [address] [address] - [phone]  - [phone]     Last office visit with prescribing clinician: 3/15/2024   Last telemedicine visit with prescribing clinician: Visit date not found   Next office visit with prescribing clinician: Visit date not found     Additional details provided by patient:  PT HAS ENOUGH UNTIL 04/22/25    Does the patient have less than a 3 day supply:  [] Yes  [x] No    Would you like a call back once the refill request has been completed: [x] Yes [] No    If the office needs to give you a call back, can they leave a voicemail: [x] Yes [] No    Amanda May, [employer] Rep   04/14/25 08:55 EDT

## 2025-04-15 RX ORDER — DABIGATRAN ETEXILATE 150 MG/1
150 CAPSULE ORAL EVERY 12 HOURS SCHEDULED
Qty: 180 CAPSULE | Refills: 3 | Status: SHIPPED | OUTPATIENT
Start: 2025-04-15

## 2025-04-15 NOTE — TELEPHONE ENCOUNTER
Eliquis and Xarelto are both formulary alternatives however patient has had bleeding issues with both medications. Pharmacy states original RX was sent for brand only. Refill sent for generic. Prior authorization request will be faxed if required with RX coverage.

## 2025-05-06 ENCOUNTER — TELEPHONE (OUTPATIENT)
Dept: CARDIOLOGY | Facility: CLINIC | Age: 65
End: 2025-05-06
Payer: MEDICARE

## 2025-05-06 NOTE — TELEPHONE ENCOUNTER
Received cardiac clearance request from DR RAQUEL CANALES stating pt has TOTAL KNEE REPLACEMENT scheduled for 06/02/2025 and is requiring a cardiac clearance. Placed cardiac clearance request in AVL' inbox to review and address with provider.

## 2025-05-13 ENCOUNTER — TELEPHONE (OUTPATIENT)
Dept: CARDIOLOGY | Facility: CLINIC | Age: 65
End: 2025-05-13

## 2025-05-13 NOTE — TELEPHONE ENCOUNTER
Caller: Renetta Horner    Relationship: Self    Best call back number: [phone] (home)     What is the best time to reach you: ANYTIME    Who are you requesting to speak with (clinical staff, provider,  specific staff member): CLINICAL    What was the call regarding: PT WAS CALLING TO CONFIRM WE RECEIVED A CARDIAC CLEARANCE  from DR RAQUEL CANALES FOR TOTAL KNEE REPLACEMENT scheduled for 06/02/2025. PLEASE WOULD ALSO LIKE TO KNOW WHEN HER LAST HEART CATH WAS DATED FOR.

## 2025-05-13 NOTE — TELEPHONE ENCOUNTER
Clearance request was received and will address with provider, called patient no answer, left v/m for patient.

## 2025-05-13 NOTE — TELEPHONE ENCOUNTER
Patient notified we received the clearance request and she was given the date of her last appointment and heart cath.

## 2025-05-23 DIAGNOSIS — R60.0 PERIPHERAL EDEMA: ICD-10-CM

## 2025-05-23 DIAGNOSIS — I51.89 GRADE II DIASTOLIC DYSFUNCTION: ICD-10-CM

## 2025-05-23 RX ORDER — POTASSIUM CHLORIDE 750 MG/1
10 TABLET, EXTENDED RELEASE ORAL DAILY PRN
Qty: 90 TABLET | Refills: 1 | Status: SHIPPED | OUTPATIENT
Start: 2025-05-23

## 2025-05-23 NOTE — TELEPHONE ENCOUNTER
Received faxed request from [facility] pharmacy requesting refill's on potassium 10 meq daily as needed.     Rx submitted to pharmacy

## 2025-06-25 DIAGNOSIS — I48.0 PAROXYSMAL ATRIAL FIBRILLATION: ICD-10-CM

## 2025-06-25 DIAGNOSIS — R60.0 PERIPHERAL EDEMA: ICD-10-CM

## 2025-06-25 RX ORDER — DILTIAZEM HYDROCHLORIDE 240 MG/1
240 CAPSULE, EXTENDED RELEASE ORAL DAILY
Qty: 90 CAPSULE | Refills: 0 | Status: SHIPPED | OUTPATIENT
Start: 2025-06-25

## 2025-07-15 ENCOUNTER — OFFICE VISIT (OUTPATIENT)
Dept: CARDIOLOGY | Facility: CLINIC | Age: 65
End: 2025-07-15
Payer: MEDICARE

## 2025-07-15 VITALS
HEIGHT: 66 IN | DIASTOLIC BLOOD PRESSURE: 75 MMHG | OXYGEN SATURATION: 95 % | WEIGHT: 219 LBS | BODY MASS INDEX: 35.2 KG/M2 | SYSTOLIC BLOOD PRESSURE: 158 MMHG | HEART RATE: 55 BPM

## 2025-07-15 DIAGNOSIS — I10 PRIMARY HYPERTENSION: ICD-10-CM

## 2025-07-15 DIAGNOSIS — I48.0 PAROXYSMAL ATRIAL FIBRILLATION: Primary | ICD-10-CM

## 2025-07-15 DIAGNOSIS — R06.09 DYSPNEA ON EXERTION: ICD-10-CM

## 2025-07-15 PROCEDURE — 93000 ELECTROCARDIOGRAM COMPLETE: CPT | Performed by: PHYSICIAN ASSISTANT

## 2025-07-15 PROCEDURE — 1160F RVW MEDS BY RX/DR IN RCRD: CPT | Performed by: PHYSICIAN ASSISTANT

## 2025-07-15 PROCEDURE — 99213 OFFICE O/P EST LOW 20 MIN: CPT | Performed by: PHYSICIAN ASSISTANT

## 2025-07-15 PROCEDURE — 1159F MED LIST DOCD IN RCRD: CPT | Performed by: PHYSICIAN ASSISTANT

## 2025-07-15 PROCEDURE — 3078F DIAST BP <80 MM HG: CPT | Performed by: PHYSICIAN ASSISTANT

## 2025-07-15 PROCEDURE — 3077F SYST BP >= 140 MM HG: CPT | Performed by: PHYSICIAN ASSISTANT

## 2025-07-15 NOTE — PROGRESS NOTES
Problem list     Subjective   Renetta Horner is a 64 y.o. female     Chief Complaint   Patient presents with    Follow-up     14 month follow up - patient is post covid ( had covid in May) patient's knee surgery has been rescheduled for 8/4/25 BGO.     Labs: patient had labs yesterday and was told labs normal.    Problem list:  1.  Paroxysmal atrial fibrillation.  1.1.  Ablation with Dr. Trejo, 2017.  1.2.  Recurrent symptoms and issues of A-fib eventually prompting institution of Tikosyn at that time.  1.3.  Pradaxa utilize for CVA risk reduction.  2.  Minor nonobstructive CAD per catheterization, 2021.  3.  Preserved systolic function.  4.  Hypertension  5.  Dyslipidemia  6.  Diabetes mellitus.    HPI  The patient presents in the clinic today for routine evaluation and follow-up.  She has done well from general cardiovascular standpoint since last evaluation.  She rarely has palpitations, basically not on current medical regimen and none of significance since ablation.  She denies any angina.  Dyspnea is at baseline.  She has no failure symptoms.  She does well on current medical regimen and has no specific concerns.  She reports that had laboratories performed just yesterday with her primary care provider were normal.  We have requested those.  She typically is normotensive.  She has no further complaints.    Current Outpatient Medications on File Prior to Visit   Medication Sig Dispense Refill    Cartia  MG 24 hr capsule Take 1 capsule by mouth once daily 90 capsule 0    dabigatran etexilate (Pradaxa) 150 MG capsu Take 1 capsule by mouth Every 12 (Twelve) Hours. 180 capsule 3    dofetilide (TIKOSYN) 500 MCG capsule Take 1 capsule by mouth Every 12 (Twelve) Hours. 180 capsule 3    furosemide (LASIX) 20 MG tablet Take 1 tablet by mouth Daily As Needed (edema). (Patient taking differently: Take 1 tablet by mouth. 2 times weekly prn) 90 tablet 3    irbesartan (AVAPRO) 300 MG tablet Take 1 tablet by  mouth every night at bedtime. (Patient taking differently: Take 1 tablet by mouth As Needed.) 90 tablet 3    loratadine (CLARITIN) 10 MG tablet Take 1 tablet by mouth Daily.      Mounjaro 7.5 MG/0.5ML solution pen-injector 15 mg 1 (One) Time Per Week.      pantoprazole (PROTONIX) 40 MG EC tablet Take 1 tablet by mouth Daily.      potassium chloride 10 MEQ CR tablet Take 1 tablet by mouth Daily As Needed (with lasix). 90 tablet 1    [DISCONTINUED] vitamin D (ERGOCALCIFEROL) 01105 units capsule capsule Take 1 capsule by mouth 1 (One) Time Per Week.       No current facility-administered medications on file prior to visit.       Atenolol, Eliquis [apixaban], Indomethacin, Lisinopril, Penicillins, Aspirin, Codeine, Other, Sulfa antibiotics, Ultram [tramadol], and Xarelto [rivaroxaban]    Past Medical History:   Diagnosis Date    Arthritis     Atrial fibrillation     CHADS-VASC = 3    Atrial fibrillation 05/09/2016    1. Persistent Atrial Fibrillation s/p ECV X2 with IRAF and failed Flecainide     A. Diagnosed in 2015. CHADS-VASc = 3 (DM, HTN, female)      B. Xarelto & ASA stopped due to hemarthrosis of knee.        C. Restarted on Pradaxa and no major issues 150 mg BID     D. Echo 4/8/15 showed EF 65%, moderate, Grade II diastolic dysfunction, mild SERA (LA 3.9cm), normal valves     E. Myocardial Perfusion study 4/8/15: negative for ischemia, EF 68%      Chest pain     COVID-19 vaccine administered 03/01/2021    2nd - 03/30/2021 - Moderna 10/23/2021 ,05/13/2022 ,09/16/2022 - Moderna    Diabetes mellitus     on oral agents, Type 2 , Patient states last a1c was 5.9 --DX'D 5 YEARS AGO, CHECKS BLOOD SUGAR DAILY AVERAGE 115-122    Dizziness     Fatigue     GERD (gastroesophageal reflux disease)     Headache     Heart murmur     Hypertension     CONTROLLED WITH MEDS PER PT     Kidney stone     Palpitations     PONV (postoperative nausea and vomiting)     Wears dentures     UPPER PLATE     Wears dentures     upper    Wears  "eyeglasses     reading       Social History     Socioeconomic History    Marital status:    Tobacco Use    Smoking status: Former     Current packs/day: 0.00     Average packs/day: 0.3 packs/day for 3.0 years (0.8 ttl pk-yrs)     Types: Cigarettes     Start date:      Quit date:      Years since quittin.5    Smokeless tobacco: Never   Vaping Use    Vaping status: Never Used   Substance and Sexual Activity    Alcohol use: No    Drug use: No    Sexual activity: Defer       Family History   Problem Relation Age of Onset    Coronary artery disease Mother     Breast cancer Mother     Diabetes Mother     Other Mother         Benign prostatic hypertrophy       Review of Systems   Constitutional: Negative.  Negative for chills, diaphoresis, fatigue and fever.   HENT: Negative.  Negative for hearing loss.    Eyes: Negative.  Negative for visual disturbance.   Respiratory: Negative.  Negative for apnea, cough, chest tightness, shortness of breath and wheezing.    Cardiovascular: Negative.  Negative for chest pain, palpitations and leg swelling.   Gastrointestinal: Negative.  Negative for abdominal pain and blood in stool.   Endocrine: Negative.    Genitourinary: Negative.  Negative for hematuria.   Musculoskeletal:  Positive for arthralgias. Negative for back pain, myalgias, neck pain and neck stiffness.   Skin: Negative.  Negative for rash and wound.   Allergic/Immunologic: Positive for environmental allergies (seasonal) and food allergies (turkey).   Neurological: Negative.  Negative for dizziness, syncope, weakness, light-headedness, numbness and headaches.   Hematological:  Bruises/bleeds easily (on pradaxa).   Psychiatric/Behavioral:  Negative for sleep disturbance.        Objective   Vitals:    07/15/25 1005   BP: 158/75   Pulse: 55   SpO2: 95%   Weight: 99.3 kg (219 lb)   Height: 167.6 cm (66\")      /75   Pulse 55   Ht 167.6 cm (66\")   Wt 99.3 kg (219 lb)   LMP  (LMP Unknown)   SpO2 95%  "  BMI 35.35 kg/m²    Lab Results (most recent)       None          Physical Exam  Vitals and nursing note reviewed.   Constitutional:       General: She is not in acute distress.     Appearance: She is well-developed.   HENT:      Head: Normocephalic and atraumatic.   Eyes:      Conjunctiva/sclera: Conjunctivae normal.      Pupils: Pupils are equal, round, and reactive to light.   Neck:      Vascular: No JVD.      Trachea: No tracheal deviation.   Cardiovascular:      Rate and Rhythm: Normal rate and regular rhythm.      Heart sounds: Normal heart sounds.   Pulmonary:      Effort: Pulmonary effort is normal.      Breath sounds: Normal breath sounds.   Abdominal:      General: Bowel sounds are normal. There is no distension.      Palpations: Abdomen is soft. There is no mass.      Tenderness: There is no abdominal tenderness. There is no guarding or rebound.   Musculoskeletal:         General: No tenderness or deformity. Normal range of motion.      Cervical back: Normal range of motion and neck supple.   Skin:     General: Skin is warm and dry.      Coloration: Skin is not pale.      Findings: No erythema or rash.   Neurological:      Mental Status: She is alert and oriented to person, place, and time.   Psychiatric:         Behavior: Behavior normal.         Thought Content: Thought content normal.         Judgment: Judgment normal.           Procedure     ECG 12 Lead    Date/Time: 7/15/2025 10:10 AM  Performed by: Ancelmo Estevez PA    Authorized by: Ancelmo Estevez PA  Comparison: compared with previous ECG from 3/21/2025  Comparison to previous ECG: Sinus rhythm, rate 57, first-degree AV block, PAC noted, no acute changes noted.  QT/QTc interval stable on Tikosyn therapy.             Assessment & Plan      Diagnosis Plan   1. Paroxysmal atrial fibrillation        2. Primary hypertension        3. Dyspnea on exertion          1.  At this time, the patient appears to be doing very well.  A-fib is  well-maintained with current medical regimen.  No further modification is indicated.    2.  As the patient feels well, no further workup is indicated at this time.    3.  She is normotensive on current medical regimen.  No adjustments of medications will be made.    4.  We will continue to see her on 6 to 8-month intervals, sooner for complications.           Renetta Horner  reports that she quit smoking about 28 years ago. Her smoking use included cigarettes. She started smoking about 31 years ago. She has a 0.8 pack-year smoking history. She has never used smokeless tobacco.     Advance Care Planning   ACP discussion was held with the patient during this visit. Patient does not have an advance directive, declines further assistance.            Electronically signed by:

## 2025-07-21 ENCOUNTER — TELEPHONE (OUTPATIENT)
Dept: CARDIOLOGY | Facility: CLINIC | Age: 65
End: 2025-07-21
Payer: MEDICARE

## 2025-07-21 NOTE — LETTER
July 22, 2025       To Whom It May Concern:    We build our practice on integrity and patient care. The highest compliment we can receive is the referral of friends, family and patients to our office. We want to take this time to thank you for considering our group as part of your care team.    The medical records for Renetta Horner 1960 were reviewed for pre-operative clearance on 07/22/2025. It has been determined that this patient has:  ACCEPTABLE RISK.    Renetta Horner is currently on the following medications that will need to be held prior to surgery: PATIENT MAY HOLD PRADAXA 2 TO 3 DAYS PRIOR TO PROCEDURE. PATIENT IS TO CONTINUE ASPIRIN 81 MG IN ABSENCE OF PRADAXA.    This pre-operative evaluation has been completed based on patient reported medical conditions at the date of this letter, this evaluation may have considered in part results of additional testing, completion of an EKG and patient reported symptoms at the time of their visit.    Renetta Horner's pre-operative clearance is good for thirty(30) days from the date of this letter with no reported changes in health, symptoms or diagnosis from the patient, their primary care provider or your office.    Your office has reported the patient will under go LEFT TKA on 08/04/2025 with Dr. RAQUEL CANALES. If this appointment is changed or moved outside of thirty(30) days from 07/22/2025 this pre-operative clearance is void.    If you have any further questions please give our office a call.    Thank you for your trust,      LOPEZ Farmer

## 2025-07-21 NOTE — TELEPHONE ENCOUNTER
Received cardiac clearance request from Dr Zander Arriaza stating pt has LT TKA scheduled for 08/04/2025 and is requiring a cardiac clearance. Placed cardiac clearance request in VAL inbox to review and address with provider.

## 2025-07-29 ENCOUNTER — TELEPHONE (OUTPATIENT)
Dept: CARDIOLOGY | Facility: CLINIC | Age: 65
End: 2025-07-29
Payer: MEDICARE

## 2025-07-29 DIAGNOSIS — I48.19 PERSISTENT ATRIAL FIBRILLATION: Primary | ICD-10-CM

## 2025-07-29 RX ORDER — DEXLANSOPRAZOLE 60 MG/1
60 CAPSULE, DELAYED RELEASE ORAL 2 TIMES DAILY
COMMUNITY

## 2025-07-29 RX ORDER — SUCRALFATE 1 G/1
1 TABLET ORAL 4 TIMES DAILY
COMMUNITY

## 2025-07-29 NOTE — TELEPHONE ENCOUNTER
The patient called to report she is in A-fib, she can feel it. Her heart rate is jumping from 130-90. She is feeling palpitations real bad. She does have a call in to Dr. Herman's office as well. The patient is supposed to have knee surgery on 8/4/25.

## 2025-07-29 NOTE — TELEPHONE ENCOUNTER
Ancelmo Estevez PA to Me (Selected Message)    7/29/25  3:58 PM  Ensure that the patient is indeed on Tikosyn and diltiazem.  If we need we can get her to come in and get an EKG but we likely are going to await EP's recommendations on this.  Still we have that option.

## 2025-07-29 NOTE — TELEPHONE ENCOUNTER
"Patient called to report she is in afib.  Called patient to discuss symptoms she is having.  She states her heart is \"just racing.\"  She reports her heart rates vary from 50's to low 100's.  Her blood pressure this morning is 132/82.  Ordered EKG per protocol and faxed to [facility].  Told patient to go get EKG so our providers can see what is going on with her heart this morning.  Verbalized understanding.  "

## 2025-07-30 ENCOUNTER — TELEPHONE (OUTPATIENT)
Dept: CARDIOLOGY | Facility: CLINIC | Age: 65
End: 2025-07-30
Payer: MEDICARE

## 2025-07-30 NOTE — TELEPHONE ENCOUNTER
EKG shows AF. Called patient and she states that she is back in rhythm. She is having horrible acid reflux and seeing a new doctor on 8/21/25 about this. She thinks this was the trigger. She cancelled her knee replacement surgery because she was scared to hold her blood thinner. All questions answered. No further orders.     Lelo Arevalo, SUSANA

## 2025-07-30 NOTE — TELEPHONE ENCOUNTER
Patient called earlier today to let us know she had EKG done yesterday and wanted to know if it had been sent to us yet.    I called [facility], medical records.  She stated the EKG had been done but had not been sent to medical records yet.  She will look for it and fax to us.    Ayesha

## 2025-08-21 ENCOUNTER — TELEPHONE (OUTPATIENT)
Dept: CARDIOLOGY | Facility: CLINIC | Age: 65
End: 2025-08-21
Payer: MEDICARE

## 2025-08-25 ENCOUNTER — TELEPHONE (OUTPATIENT)
Dept: CARDIOLOGY | Facility: CLINIC | Age: 65
End: 2025-08-25
Payer: MEDICARE